# Patient Record
Sex: MALE | Race: WHITE | NOT HISPANIC OR LATINO | Employment: OTHER | URBAN - METROPOLITAN AREA
[De-identification: names, ages, dates, MRNs, and addresses within clinical notes are randomized per-mention and may not be internally consistent; named-entity substitution may affect disease eponyms.]

---

## 2017-05-18 ENCOUNTER — OFFICE VISIT - RIVER FALLS (OUTPATIENT)
Dept: FAMILY MEDICINE | Facility: CLINIC | Age: 72
End: 2017-05-18

## 2017-05-18 ASSESSMENT — MIFFLIN-ST. JEOR: SCORE: 1602.62

## 2017-06-02 ENCOUNTER — TRANSFERRED RECORDS (OUTPATIENT)
Dept: HEALTH INFORMATION MANAGEMENT | Facility: CLINIC | Age: 72
End: 2017-06-02

## 2017-06-29 ENCOUNTER — AMBULATORY - RIVER FALLS (OUTPATIENT)
Dept: FAMILY MEDICINE | Facility: CLINIC | Age: 72
End: 2017-06-29

## 2017-06-30 LAB
CHOLEST SERPL-MCNC: 210 MG/DL (ref 125–200)
CHOLEST/HDLC SERPL: 3.3 {RATIO}
CREAT SERPL-MCNC: 1.15 MG/DL (ref 0.7–1.18)
GLUCOSE BLD-MCNC: 87 MG/DL (ref 65–99)
HDLC SERPL-MCNC: 63 MG/DL
LDLC SERPL CALC-MCNC: 130 MG/DL
NONHDLC SERPL-MCNC: 147 MG/DL
PSA SERPL-MCNC: 4.3 NG/ML
TRIGL SERPL-MCNC: 83 MG/DL

## 2017-08-03 ENCOUNTER — TRANSFERRED RECORDS (OUTPATIENT)
Dept: HEALTH INFORMATION MANAGEMENT | Facility: CLINIC | Age: 72
End: 2017-08-03

## 2017-08-07 ENCOUNTER — TRANSFERRED RECORDS (OUTPATIENT)
Dept: HEALTH INFORMATION MANAGEMENT | Facility: CLINIC | Age: 72
End: 2017-08-07

## 2017-08-17 ENCOUNTER — PRE VISIT (OUTPATIENT)
Dept: CARDIOLOGY | Facility: CLINIC | Age: 72
End: 2017-08-17

## 2017-08-30 ENCOUNTER — OFFICE VISIT (OUTPATIENT)
Dept: CARDIOLOGY | Facility: CLINIC | Age: 72
End: 2017-08-30
Payer: MEDICARE

## 2017-08-30 VITALS
DIASTOLIC BLOOD PRESSURE: 64 MMHG | HEIGHT: 69 IN | HEART RATE: 48 BPM | SYSTOLIC BLOOD PRESSURE: 136 MMHG | BODY MASS INDEX: 28.29 KG/M2 | WEIGHT: 191 LBS

## 2017-08-30 DIAGNOSIS — I48.0 PAROXYSMAL ATRIAL FIBRILLATION (H): Primary | ICD-10-CM

## 2017-08-30 PROCEDURE — 99204 OFFICE O/P NEW MOD 45 MIN: CPT | Mod: 25 | Performed by: INTERNAL MEDICINE

## 2017-08-30 PROCEDURE — 93000 ELECTROCARDIOGRAM COMPLETE: CPT | Performed by: INTERNAL MEDICINE

## 2017-08-30 RX ORDER — AMLODIPINE BESYLATE 5 MG/1
5 TABLET ORAL DAILY
COMMUNITY
End: 2018-09-25

## 2017-08-30 RX ORDER — FLECAINIDE ACETATE 100 MG/1
100 TABLET ORAL 2 TIMES DAILY
COMMUNITY
End: 2021-04-12

## 2017-08-30 RX ORDER — LEVOTHYROXINE SODIUM 100 UG/1
100 TABLET ORAL DAILY
COMMUNITY
End: 2021-04-26

## 2017-08-30 RX ORDER — AMOXICILLIN 500 MG/1
500 CAPSULE ORAL
COMMUNITY
End: 2021-08-02

## 2017-08-30 RX ORDER — ATENOLOL 25 MG/1
12.5 TABLET ORAL DAILY
COMMUNITY
End: 2017-10-16

## 2017-08-30 NOTE — LETTER
2017             Pj Gordon MD   [facility]    [address] [place] [address]      RE: Zachariah Ram   MRN: [id number]   : 1945      Dear Dr. Gordon:       Thank you for allowing me to participate in the care of this very delightful patient.  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  Since then, he has been having occasional episodes of atrial fibrillation, for which he had been taking flecainide on a p.r.n. basis, but eventually he was taking it on a schedule several years ago.  He was taking 100 mg of flecainide twice a day along with atenolol at 12.5 mg a day.  The patient requested transfer of care from the [facility] to our clinic because of convenience.  He does spend 5 months here in [place] and the rest of the time in [place].      Approximately a month ago at the recommendation of a retired cardiologist, who happened to be his hiking george, he changed his atenolol to a calcium channel blocker.  At the same time, his flecainide was reduced from 100-50 twice a day as well.  Eventually, he developed atrial tachycardia with 2:1 AV conduction.  He was given Eliquis and proceeded with a RUBÉN-guided cardioversion 3 days later.  The RUBÉN report demonstrated a small residual communication between the left atrium and left atrial appendage.  He was recommended to be on Eliquis indefinitely given his CHADS-VASc score of 2.      Mr. Ram is otherwise doing quite well.  He hikes frequently, up to 4 hours each time.  He denies orthopnea, PND, chest pain or chest discomfort.  He is known to have a normal LV systolic function and no mitral regurgitation since the mitral valve repair.  His baseline heart rate is usually in the 40s-50s, and max is about 105 when he is very  active.      I congratulated the patient on his mostly asymptomatic status.  I will look at the actual DVD of his RUBÉN to quantify the amount of residual leak between the left atrium and left atrial appendage.  If we cannot determine that, we can perform a heart CT.  If there is no significant communication, one could argue that the risk of bleeding with continuation of anticoagulation may be higher than the benefit.  In the meantime, I would like the patient to continue with the flecainide and the current dose of atenolol.  The patient does have a history of premature contractions as well, and the flecainide seems to help him with it.  I would like to see him back in a year's time, sooner should the circumstances dictate.      Sincerely,      Juan Toure MD

## 2017-08-30 NOTE — PROGRESS NOTES
2017             Pj Gordon MD   [facility]    [address] [place] [address]      RE: Zachariah Ram   MRN: [id number]   : 1945      Dear Dr. Gordon:       Thank you for allowing me to participate in the care of this very delightful patient.  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  Since then, he has been having occasional episodes of atrial fibrillation, for which he had been taking flecainide on a p.r.n. basis, but eventually he was taking it on a schedule several years ago.  He was taking 100 mg of flecainide twice a day along with atenolol at 12.5 mg a day.  The patient requested transfer of care from the [facility] to our clinic because of convenience.  He does spend 5 months here in [place] and the rest of the time in [place].      Approximately a month ago at the recommendation of a retired cardiologist, who happened to be his hiking george, he changed his atenolol to a calcium channel blocker.  At the same time, his flecainide was reduced from 100-50 twice a day as well.  Eventually, he developed atrial tachycardia with 2:1 AV conduction.  He was given Eliquis and proceeded with a RUBÉN-guided cardioversion 3 days later.  The RUBÉN report demonstrated a small residual communication between the left atrium and left atrial appendage.  He was recommended to be on Eliquis indefinitely given his CHADS-VASc score of 2.      Mr. Ram is otherwise doing quite well.  He hikes frequently, up to 4 hours each time.  He denies orthopnea, PND, chest pain or chest discomfort.  He is known to have a normal LV systolic function and no mitral regurgitation since the mitral valve repair.  His baseline heart rate is usually in the 40s-50s, and max is about 105 when he is very  active.      I congratulated the patient on his mostly asymptomatic status.  I will look at the actual DVD of his RUBÉN to quantify the amount of residual leak between the left atrium and left atrial appendage.  If we cannot determine that, we can perform a heart CT.  If there is no significant communication, one could argue that the risk of bleeding with continuation of anticoagulation may be higher than the benefit.  In the meantime, I would like the patient to continue with the flecainide and the current dose of atenolol.  The patient does have a history of premature contractions as well, and the flecainide seems to help him with it.  I would like to see him back in a year's time, sooner should the circumstances dictate.      Sincerely,      MD KIRBY Campbell MD             D: 2017 08:51   T: 2017 12:07   MT: claudia      Name:     LA NENA GIBSON   MRN:      [id number]        Account:      [id number]   :      1945           Service Date: 2017      Document: [id number]

## 2017-08-30 NOTE — PROGRESS NOTES
HPI and Plan:   See dictation  [id number]  Orders Placed This Encounter   Procedures     EKG 12-lead complete w/read - Clinics (performed today)       Orders Placed This Encounter   Medications     apixaban ANTICOAGULANT (ELIQUIS) 5 MG tablet     flecainide (TAMBOCOR) 100 MG tablet     Sig: Take 100 mg by mouth     amLODIPine (NORVASC) 5 MG tablet     Sig: Take 5 mg by mouth daily     atenolol (TENORMIN) 12.5 mg TABS half-tab     Sig: Take 12.5 mg by mouth daily     levothyroxine (LEVOXYL) 100 MCG tablet     Sig: Take 100 mcg by mouth daily     amoxicillin (AMOXIL) 500 MG capsule     Sig: Take 500 mg by mouth Prior to dentist 2000mg       There are no discontinued medications.      Encounter Diagnosis   Name Primary?     Paroxysmal atrial fibrillation (H) Yes       CURRENT MEDICATIONS:  Current Outpatient Prescriptions   Medication Sig Dispense Refill     apixaban ANTICOAGULANT (ELIQUIS) 5 MG tablet        flecainide (TAMBOCOR) 100 MG tablet Take 100 mg by mouth       amLODIPine (NORVASC) 5 MG tablet Take 5 mg by mouth daily       atenolol (TENORMIN) 12.5 mg TABS half-tab Take 12.5 mg by mouth daily       levothyroxine (LEVOXYL) 100 MCG tablet Take 100 mcg by mouth daily       amoxicillin (AMOXIL) 500 MG capsule Take 500 mg by mouth Prior to dentist 2000mg         ALLERGIES   Not on File    PAST MEDICAL HISTORY:  Past Medical History:   Diagnosis Date     Atrial flutter (H)      GERD (gastroesophageal reflux disease)      Hyperlipidemia      Hypertension      Obstructive sleep apnea      Paroxysmal atrial fibrillation (H)      Smoking history     quit 1973       PAST SURGICAL HISTORY:  Past Surgical History:   Procedure Laterality Date     CARDIOVERSION  08/07/2017     Left atrial appendage ligation  1996     REPAIR VALVE MITRAL  1996       FAMILY HISTORY:  Family History   Problem Relation Age of Onset     Coronary Artery Disease Early Onset Father        SOCIAL HISTORY:  Social History     Social History     Marital  "status:      Spouse name: N/A     Number of children: N/A     Years of education: N/A     Social History Main Topics     Smoking status: Never Smoker     Smokeless tobacco: Never Used     Alcohol use None     Drug use: None     Sexual activity: Not Asked     Other Topics Concern     None     Social History Narrative       Review of Systems:  Skin:  Negative       Eyes:  Positive for glasses    ENT:  Negative      Respiratory:  Negative       Cardiovascular:  Negative      Gastroenterology: Negative      Genitourinary:  Negative      Musculoskeletal:  Negative      Neurologic:  Negative      Psychiatric:  Negative      Heme/Lymph/Imm:  Negative      Endocrine:  Positive for thyroid disorder      Physical Exam:  Vitals: /64  Pulse (!) 48  Ht 1.753 m (5' 9\")  Wt 86.6 kg (191 lb)  BMI 28.21 kg/m2    Constitutional:  cooperative, alert and oriented, well developed, well nourished, in no acute distress        Skin:  warm and dry to the touch, no apparent skin lesions or masses noted        Head:  normocephalic, no masses or lesions        Eyes:  pupils equal and round, conjunctivae and lids unremarkable, sclera white, no xanthalasma, EOMS intact, no nystagmus        ENT:  no pallor or cyanosis, dentition good        Neck:  carotid pulses are full and equal bilaterally, JVP normal, no carotid bruit, no thyromegaly        Chest:  normal breath sounds, clear to auscultation, normal A-P diameter, normal symmetry, normal respiratory excursion, no use of accessory muscles          Cardiac: regular rhythm, normal S1/S2, no S3 or S4, apical impulse not displaced, no murmurs, gallops or rubs                  Abdomen:  abdomen soft, non-tender, BS normoactive, no mass, no HSM, no bruits        Vascular: pulses full and equal, no bruits auscultated                                        Extremities and Back:  no deformities, clubbing, cyanosis, erythema observed              Neurological:  affect appropriate, " oriented to time, person and place              CC  No referring provider defined for this encounter.

## 2017-08-30 NOTE — MR AVS SNAPSHOT
"              After Visit Summary   2017    Zachariah Ram    MRN: [id number]           Patient Information     Date Of Birth          1945        Visit Information        Provider Department      2017 8:15 AM Juan Toure MD [facility] [place]        Today's Diagnoses     Paroxysmal atrial fibrillation (H)    -  1       Follow-ups after your visit        Who to contact     If you have questions or need follow up information about today's clinic visit or your schedule please contact [facility] directly at [phone].  Normal or non-critical lab and imaging results will be communicated to you by [employer], letter or phone within 4 business days after the clinic has received the results. If you do not hear from us within 7 days, please contact the clinic through [employer] or phone. If you have a critical or abnormal lab result, we will notify you by phone as soon as possible.  Submit refill requests through [employer] or call your pharmacy and they will forward the refill request to us. Please allow 3 business days for your refill to be completed.          Additional Information About Your Visit        MyChart Information     [employer] lets you send messages to your doctor, view your test results, renew your prescriptions, schedule appointments and more. To sign up, go to [email or web address] . Click on \"Log in\" on the left side of the screen, which will take you to the Welcome page. Then click on \"Sign up Now\" on the right side of the page.     You will be asked to enter the access code listed below, as well as some personal information. Please follow the directions to create your username and password.     Your access code is: [email or web address]  Expires: 2017  8:44 AM     Your access code will  in 90 days. If you need help or a new code, please call your [place] clinic or [phone].        Care EveryWhere ID     " "This is your Care EveryWhere ID. This could be used by other organizations to access your [facility] medical records  [id number]        Your Vitals Were     Pulse Height BMI (Body Mass Index)             48 1.753 m (5' 9\") 28.21 kg/m2          Blood Pressure from Last 3 Encounters:   08/30/17 136/64    Weight from Last 3 Encounters:   08/30/17 86.6 kg (191 lb)              We Performed the Following     EKG 12-lead complete w/read - Clinics (performed today)        Primary Care Provider Office Phone # Fax #    Pj Gordon [phone]       [facility] [address] [facility] [address]        Equal Access to Services     CARLEE LIND : Hadii jeanette arorao Soyrn, waaxda luqadaha, qaybta kaalmada adeegyada, fredy oseguera . So [facility] [phone].    ATENCIÓN: Si habla español, tiene a arellano disposición servicios gratuitos de asistencia lingüística. [facility] [phone].    We comply with applicable federal civil rights laws and [place] laws. We do not discriminate on the basis of race, color, national origin, age, disability sex, sexual orientation or gender identity.            Thank you!     Thank you for choosing [facility] [place]  for your care. Our goal is always to provide you with excellent care. Hearing back from our patients is one way we can continue to improve our services. Please take a few minutes to complete the written survey that you may receive in the mail after your visit with us. Thank you!             Your Updated Medication List - Protect others around you: Learn how to safely use, store and throw away your medicines at www.disposemymeds.org.          This list is accurate as of: 8/30/17  8:44 AM.  Always use your most recent med list.                   Brand Name Dispense Instructions for use Diagnosis    amLODIPine 5 MG tablet    NORVASC     Take 5 mg by mouth daily        amoxicillin 500 " MG capsule    AMOXIL     Take 500 mg by mouth Prior to dentist 2000mg        atenolol 12.5 mg Tabs half-tab    TENORMIN     Take 12.5 mg by mouth daily        ELIQUIS 5 MG tablet   Generic drug:  apixaban ANTICOAGULANT           flecainide 100 MG tablet    TAMBOCOR     Take 100 mg by mouth        LEVOXYL 100 MCG tablet   Generic drug:  levothyroxine      Take 100 mcg by mouth daily

## 2017-09-01 ENCOUNTER — TELEPHONE (OUTPATIENT)
Dept: CARDIOLOGY | Facility: CLINIC | Age: 72
End: 2017-09-01

## 2017-10-16 ENCOUNTER — TELEPHONE (OUTPATIENT)
Dept: CARDIOLOGY | Facility: CLINIC | Age: 72
End: 2017-10-16

## 2017-10-16 DIAGNOSIS — I48.0 PAROXYSMAL ATRIAL FIBRILLATION (H): Primary | ICD-10-CM

## 2017-10-16 RX ORDER — METOPROLOL SUCCINATE 25 MG/1
TABLET, EXTENDED RELEASE ORAL
Qty: 45 TABLET | Refills: 2 | Status: SHIPPED | OUTPATIENT
Start: 2017-10-16 | End: 2018-05-11

## 2017-10-16 NOTE — TELEPHONE ENCOUNTER
Patient called and atenolol not available at the pharmacy currently on 12.5mg po qd.  Spoke with Dr Toure patient to switch over to metoporlol XL 12.5mg po qd.  Patient requesting to have sent to [facility].  Medication list updated in epic and medication sent to requested pharmacy.  Message left for patient regarding above.  KAIN Torres

## 2018-05-11 DIAGNOSIS — I48.0 PAROXYSMAL ATRIAL FIBRILLATION (H): ICD-10-CM

## 2018-05-11 RX ORDER — METOPROLOL SUCCINATE 25 MG/1
TABLET, EXTENDED RELEASE ORAL
Qty: 90 TABLET | Refills: 1 | Status: SHIPPED | OUTPATIENT
Start: 2018-05-11 | End: 2018-08-22

## 2018-07-19 ENCOUNTER — TELEPHONE (OUTPATIENT)
Dept: CARDIOLOGY | Facility: CLINIC | Age: 73
End: 2018-07-19

## 2018-07-19 NOTE — TELEPHONE ENCOUNTER
Patient called in to inform us that he is undergoing a biopsy of his prostate on 8/9 at [facility] and wants to know how many days he needs to hold the Eliquis and when to resume it. I reviewed his chart. His CHADS2-VASc is 2 ( age, HTN) and denies and history for CVA/TIA. He did undergo a Alayna mitral valve annuloplasty in 1996. I checked with the TAVR RN's to see if bridging is indicateed and they said it was NOT.Therefore, he was instructed to hold Eliquis 3 days and to resume upon the discretion of the urologist. He asked for a Mychart code and I gave him the number to call to obtain this. He returns next month to see Dr. Raleigh Wan

## 2018-08-22 ENCOUNTER — MYC MEDICAL ADVICE (OUTPATIENT)
Dept: CARDIOLOGY | Facility: CLINIC | Age: 73
End: 2018-08-22

## 2018-08-22 ENCOUNTER — OFFICE VISIT (OUTPATIENT)
Dept: CARDIOLOGY | Facility: CLINIC | Age: 73
End: 2018-08-22
Attending: INTERNAL MEDICINE
Payer: MEDICARE

## 2018-08-22 VITALS
SYSTOLIC BLOOD PRESSURE: 120 MMHG | DIASTOLIC BLOOD PRESSURE: 60 MMHG | BODY MASS INDEX: 28.73 KG/M2 | WEIGHT: 194 LBS | HEART RATE: 40 BPM | HEIGHT: 69 IN

## 2018-08-22 DIAGNOSIS — I48.0 PAROXYSMAL ATRIAL FIBRILLATION (H): ICD-10-CM

## 2018-08-22 DIAGNOSIS — N52.9 VASCULOGENIC ERECTILE DYSFUNCTION, UNSPECIFIED VASCULOGENIC ERECTILE DYSFUNCTION TYPE: Primary | ICD-10-CM

## 2018-08-22 PROCEDURE — 93000 ELECTROCARDIOGRAM COMPLETE: CPT | Performed by: INTERNAL MEDICINE

## 2018-08-22 PROCEDURE — 99214 OFFICE O/P EST MOD 30 MIN: CPT | Mod: 25 | Performed by: INTERNAL MEDICINE

## 2018-08-22 RX ORDER — METAPROTERENOL SULFATE 10 MG
TABLET ORAL DAILY
COMMUNITY
End: 2019-01-04

## 2018-08-22 RX ORDER — SILDENAFIL 50 MG/1
50 TABLET, FILM COATED ORAL DAILY PRN
Qty: 30 TABLET | Refills: 3 | Status: SHIPPED | OUTPATIENT
Start: 2018-08-22 | End: 2018-08-22

## 2018-08-22 RX ORDER — SPIRONOLACT/HYDROCHLOROTHIAZID 25 MG-25MG
TABLET ORAL DAILY
COMMUNITY

## 2018-08-22 RX ORDER — SILDENAFIL CITRATE 20 MG/1
TABLET ORAL
Qty: 90 TABLET | Refills: 3 | Status: SHIPPED | OUTPATIENT
Start: 2018-08-22 | End: 2021-10-20

## 2018-08-22 RX ORDER — MULTIVIT-MIN/IRON/FOLIC ACID/K 18-600-40
500 CAPSULE ORAL 2 TIMES DAILY
COMMUNITY

## 2018-08-22 RX ORDER — KETOCONAZOLE 20 MG/G
CREAM TOPICAL DAILY PRN
COMMUNITY
End: 2023-05-18

## 2018-08-22 RX ORDER — UBIDECARENONE 30 MG
550 CAPSULE ORAL DAILY
COMMUNITY

## 2018-08-22 RX ORDER — LANOLIN ALCOHOL/MO/W.PET/CERES
5 CREAM (GRAM) TOPICAL
COMMUNITY
End: 2020-10-09

## 2018-08-22 NOTE — LETTER
8/22/2018    Stacy San MD, MD  [employer] [address]  [facility] [address]    RE: Zachariah Ram       Dear Colleague,    I had the pleasure of seeing Zachariah Ram in the [facility].    HPI and Plan:   See dictation  [id number]  Orders Placed This Encounter   Procedures     Follow-Up with Electrophysiologist     EKG 12-lead complete w/read - Clinics (performed today)       Orders Placed This Encounter   Medications     Potassium Gluconate 595 MG CAPS     Sig: Take 595 mg by mouth daily     RaNITidine HCl (RANITIDINE 150 MAX STRENGTH PO)     Sig: Take 150 mg by mouth daily     magnesium 200 MG TABS     Sig: Take 250 mg by mouth daily     Flaxseed, Linseed, (FLAXSEED OIL) 1200 MG CAPS     Sig: Take 2,400 mg by mouth daily     Misc Natural Products (GLUCOSAMINE CHOND MSM FORMULA PO)     Sig: Take by mouth daily     Multiple Vitamins-Minerals (VITRUM 50+ SENIOR MULTI) TABS     Sig: Take by mouth daily     Ascorbic Acid (VITAMIN C) 500 MG CAPS     Sig: Take 500 mg by mouth daily 500 mg in Am and 1,000 mg in PM     Cholecalciferol (VITAMIN D3) 1000 units CAPS     Sig: Take 1,000 mg by mouth daily     melatonin 3 MG tablet     Sig: Take 1 mg by mouth nightly as needed for sleep     CRANBERRY CONCENTRATE PO     Sig: Take 4,200 mg by mouth daily     Misc Natural Products (PROSTATE THERAPY COMPLEX) CAPS     Sig: Take by mouth daily     ketoconazole (NIZORAL) 2 % cream     Sig: Apply topically daily as needed for itching     sildenafil (VIAGRA) 50 MG tablet     Sig: Take 1 tablet (50 mg) by mouth daily as needed     Dispense:  30 tablet     Refill:  3       Medications Discontinued During This Encounter   Medication Reason     metoprolol succinate (TOPROL-XL) 25 MG 24 hr tablet          Encounter Diagnoses   Name Primary?     Paroxysmal atrial fibrillation (H)      Vasculogenic erectile dysfunction, unspecified vasculogenic erectile dysfunction type Yes       CURRENT  MEDICATIONS:  Current Outpatient Prescriptions   Medication Sig Dispense Refill     amLODIPine (NORVASC) 5 MG tablet Take 5 mg by mouth daily       amoxicillin (AMOXIL) 500 MG capsule Take 500 mg by mouth Prior to dentist 2000mg       apixaban ANTICOAGULANT (ELIQUIS) 5 MG tablet        Ascorbic Acid (VITAMIN C) 500 MG CAPS Take 500 mg by mouth daily 500 mg in Am and 1,000 mg in PM       Cholecalciferol (VITAMIN D3) 1000 units CAPS Take 1,000 mg by mouth daily       CRANBERRY CONCENTRATE PO Take 4,200 mg by mouth daily       Flaxseed, Linseed, (FLAXSEED OIL) 1200 MG CAPS Take 2,400 mg by mouth daily       flecainide (TAMBOCOR) 100 MG tablet Take 100 mg by mouth       ketoconazole (NIZORAL) 2 % cream Apply topically daily as needed for itching       levothyroxine (LEVOXYL) 100 MCG tablet Take 100 mcg by mouth daily       magnesium 200 MG TABS Take 250 mg by mouth daily       melatonin 3 MG tablet Take 1 mg by mouth nightly as needed for sleep       Misc Natural Products (GLUCOSAMINE CHOND MSM FORMULA PO) Take by mouth daily       Misc Natural Products (PROSTATE THERAPY COMPLEX) CAPS Take by mouth daily       Multiple Vitamins-Minerals (VITRUM 50+ SENIOR MULTI) TABS Take by mouth daily       Potassium Gluconate 595 MG CAPS Take 595 mg by mouth daily       RaNITidine HCl (RANITIDINE 150 MAX STRENGTH PO) Take 150 mg by mouth daily       sildenafil (VIAGRA) 50 MG tablet Take 1 tablet (50 mg) by mouth daily as needed 30 tablet 3       ALLERGIES   No Known Allergies    PAST MEDICAL HISTORY:  Past Medical History:   Diagnosis Date     Atrial flutter (H)      GERD (gastroesophageal reflux disease)      Hyperlipidemia      Hypertension      Obstructive sleep apnea      Paroxysmal atrial fibrillation (H)      Smoking history     quit 1973       PAST SURGICAL HISTORY:  Past Surgical History:   Procedure Laterality Date     CARDIOVERSION  08/07/2017     COLONOSCOPY  2015     at [facility] ([facility],  [place])     HC KNEE SCOPE,MED/LAT MENISCUS REPAIR Left 2016     HERNIA REPAIR  1994     Left atrial appendage ligation  1996     MR PROSTATE  WO & W CONTRAST  2016, 2018     PAROTIDECTOMY  2004    partial parotidectomy     REPAIR VALVE MITRAL  1996     SKIN CANCER SCREENING      Skin cancer surgeries-- basal on ear 2008, melanoma on left sideburn 2011, carcinoma right arm 2013     THYROIDECTOMY  2008    partial thyroidectomy       FAMILY HISTORY:  Family History   Problem Relation Age of Onset     Coronary Artery Disease Early Onset Father        SOCIAL HISTORY:  Social History     Social History     Marital status:      Spouse name: N/A     Number of children: N/A     Years of education: N/A     Social History Main Topics     Smoking status: Never Smoker     Smokeless tobacco: Never Used     Alcohol use Yes      Comment: occassional     Drug use: Not on file     Sexual activity: Not on file     Other Topics Concern     Not on file     Social History Narrative       Review of Systems:  Skin:  Negative       Eyes:  Positive for glasses    ENT:  Negative      Respiratory:  Negative       Cardiovascular:  Negative      Gastroenterology: Negative      Genitourinary:  Negative      Musculoskeletal:  Negative      Neurologic:  Negative      Psychiatric:  Negative      Heme/Lymph/Imm:  Negative      Endocrine:  Positive for thyroid disorder      Physical Exam:  Vitals: /60  Pulse (!) 40  Ht 1.753 m (5' 9.02\")  Wt 88 kg (194 lb)  BMI 28.64 kg/m2    Constitutional:  cooperative, alert and oriented, well developed, well nourished, in no acute distress appears younger than stated age      Skin:  warm and dry to the touch, no apparent skin lesions or masses noted          Head:  normocephalic, no masses or lesions        Eyes:  pupils equal and round, conjunctivae and lids unremarkable, sclera white, no xanthalasma, EOMS intact, no nystagmus        Lymph:No Cervical lymphadenopathy present     ENT:  no pallor or " cyanosis, dentition good        Neck:  carotid pulses are full and equal bilaterally, JVP normal, no carotid bruit        Respiratory:  normal breath sounds, clear to auscultation, normal A-P diameter, normal symmetry, normal respiratory excursion, no use of accessory muscles         Cardiac: regular rhythm;normal S1 and S2 bradycardic              pulses full and equal, no bruits auscultated                                        GI:  abdomen soft, non-tender, BS normoactive, no mass, no HSM, no bruits        Extremities and Muscular Skeletal:  no deformities, clubbing, cyanosis, erythema observed              Neurological:  no gross motor deficits        Psych:  Alert and Oriented x 3        CC  Juan Isaac Toure MD  [address]  [place] [address]                Service Date: 08/22/2018      HISTORY OF PRESENT ILLNESS:  Thank you for allowing me to participate in the care of this very delightful patient.  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  I had the pleasure of meeting the patient for first time a year ago.  He has been followed at the [facility] in [place] where he spent half a year there.      Prior to last year's visit, the patient was hospitalized for atrial tachycardia with 2:1 AV conduction.  The patient underwent a RUBÉN-guided cardioversion about 3 days later.  The RUBÉN did demonstrate a small residual communication between the left atrium and left atrial appendage and given CHADS-VASc score of 2, I agreed with the recommendation of continuing with his Eliquis.      The patient has been on flecainide 100 mg twice a day along with Toprol 12.5 mg in light of his known sinus bradycardia  Over this past year, doing quite well.  He remains very active, hiking to 2-3 times a week consisting of 2-3 hours each time.  He does admit to  feeling short of breath when doing climbing but otherwise denies any lightheadedness or dizziness.  Today's blood pressure was elevated when he walked in at 156/80 but later on, it went down to 120/60.  EKG demonstrates sinus bradycardia, rate about 45 beats per minute.  The QRS width remained the same and within normal range.      Zachariah was noted to have a high PSA level now but the biopsy was negative for cancer.  However, he stated that the MRI was done and was informed that he had a 90% probability of developing prostate cancer later on.  Additionally, he does note that his libido has come down and requests a trial of Viagra if possible.  Certainly, given his normal LV function and no evidence of any active coronary disease, I think it is reasonable for him to be on Viagra.  The patient also wanted to know whether Flomax can be helpful for his known benign prostatic hypertrophy for which he has some symptoms.  I asked him to talk to you regarding these issues.  I did go over side effects of Viagra including hypotension as well.      Lastly, I did encourage the patient to consider stopping metoprolol, given he does have symptoms of sinus bradycardia and moreover, he is on a very low dose of it.  He can take it on a p.r.n. basis, should he go into atrial tachyarrhythmia with rapid ventricular response.  For now, things are going quite well for him with the current dose of flecainide.  I would like to see Zachariah back in a year.      cc:   Stacy San MD    [facility]   [address]   [place] [address]         KIRBY GANNON MD             D: 2018   T: 2018   MT: DEREK      Name:     ZACHARIAH GIBSON   MRN:      [id number]        Account:      [id number]   :      1945           Service Date: 2018      Document: [id number]        Thank you for allowing me to participate in the care of your patient.      Sincerely,     Kirby Cope MD     [facility]    cc:   Juan Cope MD  [address]  [place] [address]

## 2018-08-22 NOTE — MR AVS SNAPSHOT
After Visit Summary   8/22/2018    Zachariah Ram    MRN: [id number]           Patient Information     Date Of Birth          1945        Visit Information        Provider Department      8/22/2018 7:45 AM Juan Toure MD [facility]        Today's Diagnoses     Vasculogenic erectile dysfunction, unspecified vasculogenic erectile dysfunction type    -  1    Paroxysmal atrial fibrillation (H)           Follow-ups after your visit        Additional Services     Follow-Up with Electrophysiologist                 Future tests that were ordered for you today     Open Future Orders        Priority Expected Expires Ordered    Follow-Up with Electrophysiologist Routine 8/22/2019 8/23/2019 8/22/2018            Who to contact     If you have questions or need follow up information about today's clinic visit or your schedule please contact [facility] directly at [phone].  Normal or non-critical lab and imaging results will be communicated to you by [employer], letter or phone within 4 business days after the clinic has received the results. If you do not hear from us within 7 days, please contact the clinic through [employer] or phone. If you have a critical or abnormal lab result, we will notify you by phone as soon as possible.  Submit refill requests through [facility] or call your pharmacy and they will forward the refill request to us. Please allow 3 business days for your refill to be completed.          Additional Information About Your Visit        MyChart Information     [facility] gives you secure access to your electronic health record. If you see a primary care provider, you can also send messages to your care team and make appointments. If you have questions, please call your primary care clinic.  If you do not have a primary care provider, please call [phone] and they will assist you.        Care EveryWhere ID      "This is your Care EveryWhere ID. This could be used by other organizations to access your [facility] medical records  [id number]        Your Vitals Were     Pulse Height BMI (Body Mass Index)             40 1.753 m (5' 9.02\") 28.64 kg/m2          Blood Pressure from Last 3 Encounters:   08/22/18 120/60   08/30/17 136/64    Weight from Last 3 Encounters:   08/22/18 88 kg (194 lb)   08/30/17 86.6 kg (191 lb)              We Performed the Following     EKG 12-lead complete w/read - Clinics (performed today)     Follow-Up with Electrophysiologist          Today's Medication Changes          These changes are accurate as of 8/22/18  8:33 AM.  If you have any questions, ask your nurse or doctor.               Start taking these medicines.        Dose/Directions    sildenafil 50 MG tablet   Commonly known as:  VIAGRA   Used for:  Vasculogenic erectile dysfunction, unspecified vasculogenic erectile dysfunction type   Started by:  Juan Toure MD        Dose:  50 mg   Take 1 tablet (50 mg) by mouth daily as needed   Quantity:  30 tablet   Refills:  3         Stop taking these medicines if you haven't already. Please contact your care team if you have questions.     metoprolol succinate 25 MG 24 hr tablet   Commonly known as:  TOPROL-XL   Stopped by:  Juan Toure MD                Where to get your medicines      These medications were sent to [facility] [address] - [place], [place] - [address]  [address], [facility] [address]     Phone:  [phone]     sildenafil 50 MG tablet                Primary Care Provider Office Phone # Fax #    Stacy San [phone]       [facility] [address]  [facility] [address]        Equal Access to Services     [facility]: Hadrenata Valencia, rigoberto diehl, fredy granger. So [facility] [phone].    ATENCIÓN: Si habla español, tiene a arellano disposición servicios gratuitos de asistencia " lingüística. Jose al [phone].    We comply with applicable federal civil rights laws and [place] laws. We do not discriminate on the basis of race, color, national origin, age, disability, sex, sexual orientation, or gender identity.            Thank you!     Thank you for choosing [facility]  for your care. Our goal is always to provide you with excellent care. Hearing back from our patients is one way we can continue to improve our services. Please take a few minutes to complete the written survey that you may receive in the mail after your visit with us. Thank you!             Your Updated Medication List - Protect others around you: Learn how to safely use, store and throw away your medicines at www.disposemymeds.org.          This list is accurate as of 8/22/18  8:33 AM.  Always use your most recent med list.                   Brand Name Dispense Instructions for use Diagnosis    amLODIPine 5 MG tablet    NORVASC     Take 5 mg by mouth daily        amoxicillin 500 MG capsule    AMOXIL     Take 500 mg by mouth Prior to dentist 2000mg        CRANBERRY CONCENTRATE PO      Take 4,200 mg by mouth daily        ELIQUIS 5 MG tablet   Generic drug:  apixaban ANTICOAGULANT           Flaxseed Oil 1200 MG Caps      Take 2,400 mg by mouth daily        flecainide 100 MG tablet    TAMBOCOR     Take 100 mg by mouth        * GLUCOSAMINE CHOND MSM FORMULA PO      Take by mouth daily        * PROSTATE THERAPY COMPLEX Caps      Take by mouth daily        ketoconazole 2 % cream    NIZORAL     Apply topically daily as needed for itching        LEVOXYL 100 MCG tablet   Generic drug:  levothyroxine      Take 100 mcg by mouth daily        magnesium 200 MG Tabs      Take 250 mg by mouth daily        melatonin 3 MG tablet      Take 1 mg by mouth nightly as needed for sleep        Potassium Gluconate 595 MG Caps      Take 595 mg by mouth daily        RANITIDINE 150 MAX STRENGTH PO      Take  150 mg by mouth daily        sildenafil 50 MG tablet    VIAGRA    30 tablet    Take 1 tablet (50 mg) by mouth daily as needed    Vasculogenic erectile dysfunction, unspecified vasculogenic erectile dysfunction type       Vitamin C 500 MG Caps      Take 500 mg by mouth daily 500 mg in Am and 1,000 mg in PM        vitamin D3 1000 units Caps      Take 1,000 mg by mouth daily        VITRUM 50+ SENIOR MULTI Tabs      Take by mouth daily        * Notice:  This list has 2 medication(s) that are the same as other medications prescribed for you. Read the directions carefully, and ask your doctor or other care provider to review them with you.

## 2018-08-22 NOTE — LETTER
8/22/2018      Stacy San MD, MD  [place] [address]  [facility] [address]      RE: Zachariah Ram       Dear Colleague,    I had the pleasure of seeing Zachariah Ram in the [facility].    Service Date: 08/22/2018      HISTORY OF PRESENT ILLNESS:  Thank you for allowing me to participate in the care of this very delightful patient.  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  I had the pleasure of meeting the patient for first time a year ago.  He has been followed at the [facility] in [place] where he spent half a year there.      Prior to last year's visit, the patient was hospitalized for atrial tachycardia with 2:1 AV conduction.  The patient underwent a RUBÉN-guided cardioversion about 3 days later.  The RUBÉN did demonstrate a small residual communication between the left atrium and left atrial appendage and given CHADS-VASc score of 2, I agreed with the recommendation of continuing with his Eliquis.      The patient has been on flecainide 100 mg twice a day along with Toprol 12.5 mg in light of his known sinus bradycardia  Over this past year, doing quite well.  He remains very active, hiking to 2-3 times a week consisting of 2-3 hours each time.  He does admit to feeling short of breath when doing climbing but otherwise denies any lightheadedness or dizziness.  Today's blood pressure was elevated when he walked in at 156/80 but later on, it went down to 120/60.  EKG demonstrates sinus bradycardia, rate about 45 beats per minute.  The QRS width remained the same and within normal range.      Zachariah was noted to have a high PSA level now but the biopsy was negative for cancer.  However, he stated that the MRI was done and was informed that he had a 90% probability of developing prostate cancer later  on.  Additionally, he does note that his libido has come down and requests a trial of Viagra if possible.  Certainly, given his normal LV function and no evidence of any active coronary disease, I think it is reasonable for him to be on Viagra.  The patient also wanted to know whether Flomax can be helpful for his known benign prostatic hypertrophy for which he has some symptoms.  I asked him to talk to you regarding these issues.  I did go over side effects of Viagra including hypotension as well.      Lastly, I did encourage the patient to consider stopping metoprolol, given he does have symptoms of sinus bradycardia and moreover, he is on a very low dose of it.  He can take it on a p.r.n. basis, should he go into atrial tachyarrhythmia with rapid ventricular response.  For now, things are going quite well for him with the current dose of flecainide.  I would like to see Zachariah back in a year.      cc:   Stacy San MD    [facility]   [address]   [place] [address]         KIRBY GANNON MD             D: 2018   T: 2018   MT: DEREK      Name:     ZACHARIAH GIBSON   MRN:      [id number]        Account:      [id number]   :      1945           Service Date: 2018      Document: [id number]           Outpatient Encounter Prescriptions as of 2018   Medication Sig Dispense Refill     amLODIPine (NORVASC) 5 MG tablet Take 5 mg by mouth daily       amoxicillin (AMOXIL) 500 MG capsule Take 500 mg by mouth Prior to dentist 2000mg       apixaban ANTICOAGULANT (ELIQUIS) 5 MG tablet        Ascorbic Acid (VITAMIN C) 500 MG CAPS Take 500 mg by mouth daily 500 mg in Am and 1,000 mg in PM       Cholecalciferol (VITAMIN D3) 1000 units CAPS Take 1,000 mg by mouth daily       CRANBERRY CONCENTRATE PO Take 4,200 mg by mouth daily       Flaxseed, Linseed, (FLAXSEED OIL) 1200 MG CAPS Take 2,400 mg by mouth daily       flecainide (TAMBOCOR) 100 MG tablet Take 100 mg by  mouth       ketoconazole (NIZORAL) 2 % cream Apply topically daily as needed for itching       levothyroxine (LEVOXYL) 100 MCG tablet Take 100 mcg by mouth daily       magnesium 200 MG TABS Take 250 mg by mouth daily       melatonin 3 MG tablet Take 1 mg by mouth nightly as needed for sleep       Misc Natural Products (GLUCOSAMINE CHOND MSM FORMULA PO) Take by mouth daily       Misc Natural Products (PROSTATE THERAPY COMPLEX) CAPS Take by mouth daily       Multiple Vitamins-Minerals (VITRUM 50+ SENIOR MULTI) TABS Take by mouth daily       Potassium Gluconate 595 MG CAPS Take 595 mg by mouth daily       RaNITidine HCl (RANITIDINE 150 MAX STRENGTH PO) Take 150 mg by mouth daily       sildenafil (REVATIO) 20 MG tablet Take 3 tablets (20 mg) by mouth daily as needed for erectile dysfunction 90 tablet 3     [DISCONTINUED] metoprolol succinate (TOPROL-XL) 25 MG 24 hr tablet Take 12.5 mg (1/2 tablet) po daily 90 tablet 1     [DISCONTINUED] sildenafil (VIAGRA) 50 MG tablet Take 1 tablet (50 mg) by mouth daily as needed 30 tablet 3     No facility-administered encounter medications on file as of 8/22/2018.        Again, thank you for allowing me to participate in the care of your patient.      Sincerely,    Juan Cope MD     [facility]

## 2018-08-22 NOTE — PROGRESS NOTES
HPI and Plan:   See dictation  [id number]  Orders Placed This Encounter   Procedures     Follow-Up with Electrophysiologist     EKG 12-lead complete w/read - Clinics (performed today)       Orders Placed This Encounter   Medications     Potassium Gluconate 595 MG CAPS     Sig: Take 595 mg by mouth daily     RaNITidine HCl (RANITIDINE 150 MAX STRENGTH PO)     Sig: Take 150 mg by mouth daily     magnesium 200 MG TABS     Sig: Take 250 mg by mouth daily     Flaxseed, Linseed, (FLAXSEED OIL) 1200 MG CAPS     Sig: Take 2,400 mg by mouth daily     Misc Natural Products (GLUCOSAMINE CHOND MSM FORMULA PO)     Sig: Take by mouth daily     Multiple Vitamins-Minerals (VITRUM 50+ SENIOR MULTI) TABS     Sig: Take by mouth daily     Ascorbic Acid (VITAMIN C) 500 MG CAPS     Sig: Take 500 mg by mouth daily 500 mg in Am and 1,000 mg in PM     Cholecalciferol (VITAMIN D3) 1000 units CAPS     Sig: Take 1,000 mg by mouth daily     melatonin 3 MG tablet     Sig: Take 1 mg by mouth nightly as needed for sleep     CRANBERRY CONCENTRATE PO     Sig: Take 4,200 mg by mouth daily     Misc Natural Products (PROSTATE THERAPY COMPLEX) CAPS     Sig: Take by mouth daily     ketoconazole (NIZORAL) 2 % cream     Sig: Apply topically daily as needed for itching     sildenafil (VIAGRA) 50 MG tablet     Sig: Take 1 tablet (50 mg) by mouth daily as needed     Dispense:  30 tablet     Refill:  3       Medications Discontinued During This Encounter   Medication Reason     metoprolol succinate (TOPROL-XL) 25 MG 24 hr tablet          Encounter Diagnoses   Name Primary?     Paroxysmal atrial fibrillation (H)      Vasculogenic erectile dysfunction, unspecified vasculogenic erectile dysfunction type Yes       CURRENT MEDICATIONS:  Current Outpatient Prescriptions   Medication Sig Dispense Refill     amLODIPine (NORVASC) 5 MG tablet Take 5 mg by mouth daily       amoxicillin (AMOXIL) 500 MG capsule Take 500 mg by mouth Prior to dentist 2000mg       apixaban  ANTICOAGULANT (ELIQUIS) 5 MG tablet        Ascorbic Acid (VITAMIN C) 500 MG CAPS Take 500 mg by mouth daily 500 mg in Am and 1,000 mg in PM       Cholecalciferol (VITAMIN D3) 1000 units CAPS Take 1,000 mg by mouth daily       CRANBERRY CONCENTRATE PO Take 4,200 mg by mouth daily       Flaxseed, Linseed, (FLAXSEED OIL) 1200 MG CAPS Take 2,400 mg by mouth daily       flecainide (TAMBOCOR) 100 MG tablet Take 100 mg by mouth       ketoconazole (NIZORAL) 2 % cream Apply topically daily as needed for itching       levothyroxine (LEVOXYL) 100 MCG tablet Take 100 mcg by mouth daily       magnesium 200 MG TABS Take 250 mg by mouth daily       melatonin 3 MG tablet Take 1 mg by mouth nightly as needed for sleep       Misc Natural Products (GLUCOSAMINE CHOND MSM FORMULA PO) Take by mouth daily       Misc Natural Products (PROSTATE THERAPY COMPLEX) CAPS Take by mouth daily       Multiple Vitamins-Minerals (VITRUM 50+ SENIOR MULTI) TABS Take by mouth daily       Potassium Gluconate 595 MG CAPS Take 595 mg by mouth daily       RaNITidine HCl (RANITIDINE 150 MAX STRENGTH PO) Take 150 mg by mouth daily       sildenafil (VIAGRA) 50 MG tablet Take 1 tablet (50 mg) by mouth daily as needed 30 tablet 3       ALLERGIES   No Known Allergies    PAST MEDICAL HISTORY:  Past Medical History:   Diagnosis Date     Atrial flutter (H)      GERD (gastroesophageal reflux disease)      Hyperlipidemia      Hypertension      Obstructive sleep apnea      Paroxysmal atrial fibrillation (H)      Smoking history     quit 1973       PAST SURGICAL HISTORY:  Past Surgical History:   Procedure Laterality Date     CARDIOVERSION  08/07/2017     COLONOSCOPY  2015     [facility] [place] [place]     HC KNEE SCOPE,MED/LAT MENISCUS REPAIR Left 2016     HERNIA REPAIR  1994     Left atrial appendage ligation  1996     MR PROSTATE  WO & W CONTRAST  2016, 2018     PAROTIDECTOMY  2004    partial parotidectomy     REPAIR VALVE MITRAL  1996  "    SKIN CANCER SCREENING      Skin cancer surgeries-- basal on ear 2008, melanoma on left sideburn 2011, carcinoma right arm 2013     THYROIDECTOMY  2008    partial thyroidectomy       FAMILY HISTORY:  Family History   Problem Relation Age of Onset     Coronary Artery Disease Early Onset Father        SOCIAL HISTORY:  Social History     Social History     Marital status:      Spouse name: N/A     Number of children: N/A     Years of education: N/A     Social History Main Topics     Smoking status: Never Smoker     Smokeless tobacco: Never Used     Alcohol use Yes      Comment: occassional     Drug use: Not on file     Sexual activity: Not on file     Other Topics Concern     Not on file     Social History Narrative       Review of Systems:  Skin:  Negative       Eyes:  Positive for glasses    ENT:  Negative      Respiratory:  Negative       Cardiovascular:  Negative      Gastroenterology: Negative      Genitourinary:  Negative      Musculoskeletal:  Negative      Neurologic:  Negative      Psychiatric:  Negative      Heme/Lymph/Imm:  Negative      Endocrine:  Positive for thyroid disorder      Physical Exam:  Vitals: /60  Pulse (!) 40  Ht 1.753 m (5' 9.02\")  Wt 88 kg (194 lb)  BMI 28.64 kg/m2    Constitutional:  cooperative, alert and oriented, well developed, well nourished, in no acute distress appears younger than stated age      Skin:  warm and dry to the touch, no apparent skin lesions or masses noted          Head:  normocephalic, no masses or lesions        Eyes:  pupils equal and round, conjunctivae and lids unremarkable, sclera white, no xanthalasma, EOMS intact, no nystagmus        Lymph:No Cervical lymphadenopathy present     ENT:  no pallor or cyanosis, dentition good        Neck:  carotid pulses are full and equal bilaterally, JVP normal, no carotid bruit        Respiratory:  normal breath sounds, clear to auscultation, normal A-P diameter, normal symmetry, normal respiratory " excursion, no use of accessory muscles         Cardiac: regular rhythm;normal S1 and S2 bradycardic              pulses full and equal, no bruits auscultated                                        GI:  abdomen soft, non-tender, BS normoactive, no mass, no HSM, no bruits        Extremities and Muscular Skeletal:  no deformities, clubbing, cyanosis, erythema observed              Neurological:  no gross motor deficits        Psych:  Alert and Oriented x 3        CC  Juan Isaac Toure MD  [address]  [place] [id number]

## 2018-08-22 NOTE — PROGRESS NOTES
Service Date: 08/22/2018      HISTORY OF PRESENT ILLNESS:  Thank you for allowing me to participate in the care of this very delightful patient.  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  I had the pleasure of meeting the patient for first time a year ago.  He has been followed at the [facility] in [place] where he spent half a year there.      Prior to last year's visit, the patient was hospitalized for atrial tachycardia with 2:1 AV conduction.  The patient underwent a RUBÉN-guided cardioversion about 3 days later.  The RUBÉN did demonstrate a small residual communication between the left atrium and left atrial appendage and given CHADS-VASc score of 2, I agreed with the recommendation of continuing with his Eliquis.      The patient has been on flecainide 100 mg twice a day along with Toprol 12.5 mg in light of his known sinus bradycardia  Over this past year, doing quite well.  He remains very active, hiking to 2-3 times a week consisting of 2-3 hours each time.  He does admit to feeling short of breath when doing climbing but otherwise denies any lightheadedness or dizziness.  Today's blood pressure was elevated when he walked in at 156/80 but later on, it went down to 120/60.  EKG demonstrates sinus bradycardia, rate about 45 beats per minute.  The QRS width remained the same and within normal range.      Zachariah was noted to have a high PSA level now but the biopsy was negative for cancer.  However, he stated that the MRI was done and was informed that he had a 90% probability of developing prostate cancer later on.  Additionally, he does note that his libido has come down and requests a trial of Viagra if possible.  Certainly, given his normal LV function and no evidence of any active coronary disease, I think it is reasonable for him to be  on Viagra.  The patient also wanted to know whether Flomax can be helpful for his known benign prostatic hypertrophy for which he has some symptoms.  I asked him to talk to you regarding these issues.  I did go over side effects of Viagra including hypotension as well.      Lastly, I did encourage the patient to consider stopping metoprolol, given he does have symptoms of sinus bradycardia and moreover, he is on a very low dose of it.  He can take it on a p.r.n. basis, should he go into atrial tachyarrhythmia with rapid ventricular response.  For now, things are going quite well for him with the current dose of flecainide.  I would like to see Zachariah back in a year.      cc:   Stacy San MD    [facility]   [address]   [place] [address]         KIRBY GANNON MD             D: 2018   T: 2018   MT: DEREK      Name:     ZACHARIAH GIBSON   MRN:      [id number]        Account:      [id number]   :      1945           Service Date: 2018      Document: [id number]

## 2018-08-22 NOTE — TELEPHONE ENCOUNTER
"Pt sent [email or web address] message: \"Dr Toure, Could you send a prescription for generic Viagra, 20 mg to [facility] [address].........  You had prescribed 50mg dose but that was going to cost $600 for 30 pills.  Apparently the generic 20 mg dose is considerably less cost.  Thank you for your assistance today....Johnathon Ram\"    Chart reviewed. Pt saw Dr. Toure for OV earlier today: \"Additionally, he does note that his libido has come down and requests a trial of Viagra if possible.  Certainly, given his normal LV function and no evidence of any active coronary disease, I think it is reasonable for him to be on Viagra.\"    Will review with Dr. Toure.   "

## 2018-08-23 ENCOUNTER — TELEPHONE (OUTPATIENT)
Dept: CARDIOLOGY | Facility: CLINIC | Age: 73
End: 2018-08-23

## 2018-09-24 ENCOUNTER — MYC MEDICAL ADVICE (OUTPATIENT)
Dept: CARDIOLOGY | Facility: CLINIC | Age: 73
End: 2018-09-24

## 2018-09-24 DIAGNOSIS — I10 HTN (HYPERTENSION): Primary | ICD-10-CM

## 2018-09-25 RX ORDER — AMLODIPINE BESYLATE 5 MG/1
5 TABLET ORAL 2 TIMES DAILY
Qty: 180 TABLET | Refills: 3 | Status: SHIPPED | OUTPATIENT
Start: 2018-09-25 | End: 2019-01-04

## 2018-09-25 NOTE — TELEPHONE ENCOUNTER
Pt sent a [employer] message regarding his BP  At our visit 8/22 you suggested that I discontinue the 12.5 mg Metoprolol, but my blood pressure is running high.  The readings range from sys 135-150, stefany 65-80, and pulse 55-65.  I like the faster pulse, vs mid 40's before.  Could my Amlodipine be increased from the current 5 mg, or some other non-beta blocker?   My sys pressures before dropping the metoprolol were in the 125-135 range, which I would like to get back to.      Will review with Dr. Toure.

## 2018-12-09 NOTE — TELEPHONE ENCOUNTER
Tejal, MD Mathew Berkowitz Jodi L RN                   I reviewed RUBÉN which shows flow into previously surgical ligation of BENTON. Continue with Elliquis,.         Spoke to Pt to make aware of Dr Toure interpretation of RUBÉN and the need to continue taking Eliquis  Pt then stated that he was in the donut hole and it was going to cost him $1200, but he was ok. Discussed that there are other NOAC's and if need be that a PA is needed that pt should call the A Fib nurse line. Pt stated that he is going to look into different insurance the will help to lower the cost of his medications. Nataliya    69 year old male with PMH of HTN, Anoxic brain injury from 2008, recent admission for bowel obstruction and resection and anastamosis 3 weeks prior, currently on Heparin  for DVT prophylaxis presenting to ED due to bright red blood per rectum [facility], pt otherwise denies any new abd pain.

## 2018-12-28 ENCOUNTER — TELEPHONE (OUTPATIENT)
Dept: CARDIOLOGY | Facility: CLINIC | Age: 73
End: 2018-12-28

## 2019-01-04 ENCOUNTER — OFFICE VISIT (OUTPATIENT)
Dept: CARDIOLOGY | Facility: CLINIC | Age: 74
End: 2019-01-04
Payer: MEDICARE

## 2019-01-04 VITALS
WEIGHT: 193 LBS | BODY MASS INDEX: 28.58 KG/M2 | HEIGHT: 69 IN | HEART RATE: 52 BPM | SYSTOLIC BLOOD PRESSURE: 149 MMHG | DIASTOLIC BLOOD PRESSURE: 73 MMHG

## 2019-01-04 DIAGNOSIS — I05.9 MITRAL VALVE DISEASE: ICD-10-CM

## 2019-01-04 DIAGNOSIS — I10 BENIGN ESSENTIAL HYPERTENSION: Primary | ICD-10-CM

## 2019-01-04 DIAGNOSIS — R60.0 LOWER EXTREMITY EDEMA: ICD-10-CM

## 2019-01-04 PROCEDURE — 99214 OFFICE O/P EST MOD 30 MIN: CPT | Performed by: PHYSICIAN ASSISTANT

## 2019-01-04 ASSESSMENT — MIFFLIN-ST. JEOR: SCORE: 1610.82

## 2019-01-04 NOTE — LETTER
1/4/2019    Stacy San MD, MD  [address]  [facility] [address]    RE: Zachariah Camarilloeric       Dear Colleague,    I had the pleasure of seeing Zachariah Ram in the [facility].    HPI:   I had the pleasure of seeing Johnatohn and his wife Griselda when he came for follow up of hypertension. He sees Dr. Toure for his history of:    1.  Mitral regurgitation secondary to severe mitral valve prolapse status post Alayna mitral valve annuloplasty 6/1996 at [facility].  2.  Paroxysmal atrial fibrillation for which he underwent ligation of the left atrial appendage at the time of his mitral valve annuloplasty.  Unfortunately, RUBÉN done 2017 did show some communication between the left atrium and left atrial appendage, and he has been placed on anticoagulation.  3.  Atrial tachycardia after he reduced his flecainide from 100-50 mg twice daily and switched his atenolol to a calcium channel blocker.  He underwent RUBÉN guided cardioversion 8/2018 and was started on anticoagulation given his CHADSVASc of 2 (hypertension, age) once RUBÉN showed communication between the left atrium and left atrial appendage.  Subsequently, has been back on flecainide since 8/2017.  No recurrence of atrial arrhythmias noted  4.  Hypertension with recent adjustments in his antihypertensive dosing      Dr. Toure saw Johnathon 8/2018 at which time he recommended discontinuation of metoprolol 12.5 mg daily secondary to significant bradycardia.  Heart rates had typically been in the 30s-40s, and he was getting dyspnea with hiking in [place], where he lives 5 months out of the year.    In 9/2018, he contacted our office noting that blood pressures have been in the 130s-150s over 60s-80s off of metoprolol.  His heart rate had come up nicely to the 50s and 60s, and he was feeling better with this.  For increased blood pressure, Dr. Toure increased his amlodipine to 5 mg twice daily.    Unfortunately, he then contacted our  "office 12/28 noting that for the last 3 months, he has been experiencing increasing lower extremity edema.  He was unsure if it was related to the increase in amlodipine or, as he started Flomax around the same time, if it was related to that.  Therefore, on his own, he discontinued Flomax and restarted metoprolol 12.5 daily.  He also decreased his amlodipine back to his previous dose of 5 mg daily.      Since then, Johnathon tells me that his heart rate has been back into the 40s-50s, his blood pressures have been in the 110-150s with the majority in the 130-140s.  His swelling did improve, and he is now back to his \"normal amount.\"  When his swelling was worse, he did wear his compression stockings, which he thought helped.    He notes that when he had increase in swelling, he had not experienced increased dyspnea, orthopnea or PND.  Denies chest pain, pressure or tightness.  Does not think he said any recurrent atrial arrhythmias, and denies palpitations.  He has not having any trouble on the Eliquis.    Blood work at [facility] 12/2018 showed a creatinine of 1.11.  Last full renal panel 7/2018 showed a sodium of 138, potassium 4.1 and creatinine 1.10.  Hepatic panel at that time was normal.    Transesophageal echocardiogram 8/2017 showed a normal ejection fraction at 60%.  No evidence of left atrial appendage thrombus, with left atrial appendage being previously ligated with a small residual communication between between the left atrium and left atrial appendage.  There was no left atrial appendage thrombus.  He is status post Alayna mitral valve annuloplasty with a mean gradient of 3-4 (heart rate 88 bpm) with mild mitral regurgitation.  EF was 60%.  At that time, he underwent DC cardioversion as well.    Assessment & Plan:    1.  Hypertension    Blood pressure is suboptimal on his current dosing of Toprol-XL 12.5 mg daily and amlodipine 5 mg daily    It appears that lower extremity edema has been worsened by " amlodipine and his bradycardia has continued after resumption of Toprol-XL    PLAN:    Discontinue amlodipine 5 mg daily    In 1 week, contact me to review weight, lower extremity edema and blood pressures.  At that time, will likely discontinue metoprolol XL due to bradycardia and initiate lisinopril for hypertension    See me 2 weeks before leaving for [place] with a BMP to assure lisinopril is working well.      2.  Atrial arrhythmias    Had a history of paroxysmal atrial fibrillation and underwent left atrial appendage ligation at the time of his mitral valve surgery in 1996.  Unfortunately, communication persisted, and he is on Eliquis for his CHADSVASc of 2 (hypertension, age)    Back on flecainide 100 mg twice daily and has not had breakthrough arrhythmias    Dr. Toure recommended discontinuation of metoprolol given symptomatic bradycardia, the patient restarted this as above    PLAN:    We will likely discontinue metoprolol succinate when he contacts me in 1 week    There is a small chance of 1-1 atrial flutter/tachycardia without a post flecainide, but heart rates are quite low with that and he is symptomatic and Dr. Toure thought discontinuing this would be reasonable    Continue anticoagulation    3.  Prostate issues    PSA is up to 9.4.  This is [facility] at [facility]    Biopsies/MRIs have shown no cancer, but atypical cells which requires routine six-month follow-up    PLAN:    Likely will need to resume Flomax down the road      Nakita Gayle PA-C, MSPAS      Orders Placed This Encounter   Procedures     Basic metabolic panel     Follow-Up with Cardiac Advanced Practice Provider     No orders of the defined types were placed in this encounter.    Medications Discontinued During This Encounter   Medication Reason     CRANBERRY CONCENTRATE PO Discontinued by another Health Care Provider     Misc Natural Products (PROSTATE THERAPY COMPLEX) CAPS Discontinued by another Health Care Provider     TAMSULOSIN HCL  PO Discontinued by another Health Care Provider     amLODIPine (NORVASC) 5 MG tablet          Encounter Diagnosis   Name Primary?     Benign essential hypertension Yes       CURRENT MEDICATIONS:  Current Outpatient Medications   Medication Sig Dispense Refill     amoxicillin (AMOXIL) 500 MG capsule Take 500 mg by mouth Prior to dentist 2000mg       apixaban ANTICOAGULANT (ELIQUIS) 5 MG tablet Take by mouth 2 times daily        Ascorbic Acid (VITAMIN C) 500 MG CAPS Take 500 mg by mouth daily 500 mg in Am and 1,000 mg in PM       Cholecalciferol (VITAMIN D3) 1000 units CAPS Take 1,000 mg by mouth daily       Flaxseed, Linseed, (FLAXSEED OIL) 1200 MG CAPS Take 2,400 mg by mouth daily       flecainide (TAMBOCOR) 100 MG tablet Take 100 mg by mouth 2 times daily        ketoconazole (NIZORAL) 2 % cream Apply topically daily as needed for itching       levothyroxine (LEVOXYL) 100 MCG tablet Take 100 mcg by mouth daily       magnesium 200 MG TABS Take 250 mg by mouth daily       melatonin 3 MG tablet Take 5 mg by mouth nightly as needed for sleep        Misc Natural Products (GLUCOSAMINE CHOND MSM FORMULA PO) Take by mouth daily       Multiple Vitamins-Minerals (VITRUM 50+ SENIOR MULTI) TABS Take by mouth daily       Potassium Gluconate 595 MG CAPS Take 595 mg by mouth daily       RaNITidine HCl (RANITIDINE 150 MAX STRENGTH PO) Take 150 mg by mouth daily       sildenafil (REVATIO) 20 MG tablet Take 3 tablets (20 mg) by mouth daily as needed for erectile dysfunction 90 tablet 3       ALLERGIES   No Known Allergies    PAST MEDICAL HISTORY:  Past Medical History:   Diagnosis Date     Atrial flutter (H)      GERD (gastroesophageal reflux disease)      Hyperlipidemia      Hypertension      Obstructive sleep apnea      Paroxysmal atrial fibrillation (H)      Smoking history     quit 1973       PAST SURGICAL HISTORY:  Past Surgical History:   Procedure Laterality Date     CARDIOVERSION  08/07/2017     COLONOSCOPY  2015     at [place]  [facility] ([facility], [place])     HC KNEE SCOPE,MED/LAT MENISCUS REPAIR Left 2016     HERNIA REPAIR  1994     Left atrial appendage ligation  1996     MR PROSTATE  WO & W CONTRAST  2016, 2018     PAROTIDECTOMY  2004    partial parotidectomy     REPAIR VALVE MITRAL  1996     SKIN CANCER SCREENING      Skin cancer surgeries-- basal on ear 2008, melanoma on left sideburn 2011, carcinoma right arm 2013     THYROIDECTOMY  2008    partial thyroidectomy       FAMILY HISTORY:  Family History   Problem Relation Age of Onset     Coronary Artery Disease Early Onset Father        SOCIAL HISTORY:  Social History     Socioeconomic History     Marital status:      Spouse name: None     Number of children: None     Years of education: None     Highest education level: None   Social Needs     Financial resource strain: None     Food insecurity - worry: None     Food insecurity - inability: None     Transportation needs - medical: None     Transportation needs - non-medical: None   Occupational History     None   Tobacco Use     Smoking status: Never Smoker     Smokeless tobacco: Never Used   Substance and Sexual Activity     Alcohol use: Yes     Comment: occassional     Drug use: None     Sexual activity: None   Other Topics Concern     Parent/sibling w/ CABG, MI or angioplasty before 65F 55M? Not Asked   Social History Narrative     None       Review of Systems:  Skin:  Negative     Eyes:  Positive for glasses  ENT:  Negative    Respiratory:  Negative for shortness of breath;dyspnea on exertion;cough  Cardiovascular:  Negative for;palpitations;chest pain;fatigue;lightheadedness;dizziness Positive for;edema  Gastroenterology: Negative for melena;hematochezia  Genitourinary:  Positive for prostate problem  Musculoskeletal:  Negative    Neurologic:  Negative    Psychiatric:  Negative    Heme/Lymph/Imm:  Negative    Endocrine:  Positive for thyroid disorder    Physical Exam:  Vitals: /73   Pulse 52   Ht  "1.753 m (5' 9\")   Wt 87.5 kg (193 lb)   BMI 28.50 kg/m       Constitutional:  cooperative, alert and oriented, well developed, well nourished, in no acute distress appears younger than stated age      Skin:  warm and dry to the touch, no apparent skin lesions or masses noted        Head:  normocephalic, no masses or lesions        Eyes:  pupils equal and round, conjunctivae and lids unremarkable, sclera white, no xanthalasma, EOMS intact, no nystagmus        ENT:  no pallor or cyanosis, dentition good        Neck:  JVP normal;no carotid bruit        Chest:  normal breath sounds, clear to auscultation, normal A-P diameter, normal symmetry, normal respiratory excursion, no use of accessory muscles        Cardiac: regular rhythm;normal S1 and S2 bradycardic                Abdomen:  abdomen soft        Vascular: pulses full and equal                                      Extremities and Back:  no deformities, clubbing, cyanosis, erythema observed   Bilateral LE edema above sock, 1+    Neurological:  no gross motor deficits          CC  Stacy San MD  [facility] [address] [place] [address]                    Thank you for allowing me to participate in the care of your patient.      Sincerely,     Sera Gayle PA-C     [facility]    cc:   Stacy San MD  [facility] [address] [place], [place] [address]        "

## 2019-01-04 NOTE — LETTER
1/4/2019    Stacy San MD, MD  [employer] [address]  [facility] [address]    RE: Zachariah Camarilloeric       Dear Colleague,    I had the pleasure of seeing Zachariah Ram in the [facility].    HPI:   I had the pleasure of seeing Johnathon and his wife Griselda when he came for follow up of hypertension. He sees Dr. Toure for his history of:    1.  Mitral regurgitation secondary to severe mitral valve prolapse status post Alayna mitral valve annuloplasty 6/1996 at [facility].  2.  Paroxysmal atrial fibrillation for which he underwent ligation of the left atrial appendage at the time of his mitral valve annuloplasty.  Unfortunately, RUBÉN done 2017 did show some communication between the left atrium and left atrial appendage, and he has been placed on anticoagulation.  3.  Atrial tachycardia after he reduced his flecainide from 100-50 mg twice daily and switched his atenolol to a calcium channel blocker.  He underwent RUBÉN guided cardioversion 8/2018 and was started on anticoagulation given his CHADSVASc of 2 (hypertension, age) once RUBÉN showed communication between the left atrium and left atrial appendage.  Subsequently, has been back on flecainide since 8/2017.  No recurrence of atrial arrhythmias noted  4.  Hypertension with recent adjustments in his antihypertensive dosing      Dr. Toure saw Johnathon 8/2018 at which time he recommended discontinuation of metoprolol 12.5 mg daily secondary to significant bradycardia.  Heart rates had typically been in the 30s-40s, and he was getting dyspnea with hiking in [place], where he lives 5 months out of the year.    In 9/2018, he contacted our office noting that blood pressures have been in the 130s-150s over 60s-80s off of metoprolol.  His heart rate had come up nicely to the 50s and 60s, and he was feeling better with this.  For increased blood pressure, Dr. Toure increased his amlodipine to 5 mg twice daily.    Unfortunately, he then contacted our  "office 12/28 noting that for the last 3 months, he has been experiencing increasing lower extremity edema.  He was unsure if it was related to the increase in amlodipine or, as he started Flomax around the same time, if it was related to that.  Therefore, on his own, he discontinued Flomax and restarted metoprolol 12.5 daily.  He also decreased his amlodipine back to his previous dose of 5 mg daily.      Since then, Johnathon tells me that his heart rate has been back into the 40s-50s, his blood pressures have been in the 110-150s with the majority in the 130-140s.  His swelling did improve, and he is now back to his \"normal amount.\"  When his swelling was worse, he did wear his compression stockings, which he thought helped.    He notes that when he had increase in swelling, he had not experienced increased dyspnea, orthopnea or PND.  Denies chest pain, pressure or tightness.  Does not think he said any recurrent atrial arrhythmias, and denies palpitations.  He has not having any trouble on the Eliquis.    Blood work at [facility] 12/2018 showed a creatinine of 1.11.  Last full renal panel 7/2018 showed a sodium of 138, potassium 4.1 and creatinine 1.10.  Hepatic panel at that time was normal.    Transesophageal echocardiogram 8/2017 showed a normal ejection fraction at 60%.  No evidence of left atrial appendage thrombus, with left atrial appendage being previously ligated with a small residual communication between between the left atrium and left atrial appendage.  There was no left atrial appendage thrombus.  He is status post Alayna mitral valve annuloplasty with a mean gradient of 3-4 (heart rate 88 bpm) with mild mitral regurgitation.  EF was 60%.  At that time, he underwent DC cardioversion as well.    Assessment & Plan:    1.  Hypertension    Blood pressure is suboptimal on his current dosing of Toprol-XL 12.5 mg daily and amlodipine 5 mg daily    It appears that lower extremity edema has been worsened by " amlodipine and his bradycardia has continued after resumption of Toprol-XL    PLAN:    Discontinue amlodipine 5 mg daily    In 1 week, contact me to review weight, lower extremity edema and blood pressures.  At that time, will likely discontinue metoprolol XL due to bradycardia and initiate lisinopril for hypertension    See me 2 weeks before leaving for [place] with a BMP to assure lisinopril is working well.      2.  Atrial arrhythmias    Had a history of paroxysmal atrial fibrillation and underwent left atrial appendage ligation at the time of his mitral valve surgery in 1996.  Unfortunately, communication persisted, and he is on Eliquis for his CHADSVASc of 2 (hypertension, age)    Back on flecainide 100 mg twice daily and has not had breakthrough arrhythmias    Dr. Toure recommended discontinuation of metoprolol given symptomatic bradycardia, the patient restarted this as above    PLAN:    We will likely discontinue metoprolol succinate when he contacts me in 1 week    There is a small chance of 1-1 atrial flutter/tachycardia without a post flecainide, but heart rates are quite low with that and he is symptomatic and Dr. Toure thought discontinuing this would be reasonable    Continue anticoagulation    3.  Prostate issues    PSA is up to 9.4.  This is followed at [facility]    Biopsies/MRIs have shown no cancer, but atypical cells which requires routine six-month follow-up    PLAN:    Likely will need to resume Flomax down the road      Nakita Gayle PA-C, MSPAS      Orders Placed This Encounter   Procedures     Basic metabolic panel     Follow-Up with Cardiac Advanced Practice Provider     No orders of the defined types were placed in this encounter.    Medications Discontinued During This Encounter   Medication Reason     CRANBERRY CONCENTRATE PO Discontinued by another Health Care Provider     Misc Natural Products (PROSTATE THERAPY COMPLEX) CAPS Discontinued by another Health Care Provider     TAMSULOSIN HCL  PO Discontinued by another Health Care Provider     amLODIPine (NORVASC) 5 MG tablet          Encounter Diagnosis   Name Primary?     Benign essential hypertension Yes       CURRENT MEDICATIONS:  Current Outpatient Medications   Medication Sig Dispense Refill     amoxicillin (AMOXIL) 500 MG capsule Take 500 mg by mouth Prior to dentist 2000mg       apixaban ANTICOAGULANT (ELIQUIS) 5 MG tablet Take by mouth 2 times daily        Ascorbic Acid (VITAMIN C) 500 MG CAPS Take 500 mg by mouth daily 500 mg in Am and 1,000 mg in PM       Cholecalciferol (VITAMIN D3) 1000 units CAPS Take 1,000 mg by mouth daily       Flaxseed, Linseed, (FLAXSEED OIL) 1200 MG CAPS Take 2,400 mg by mouth daily       flecainide (TAMBOCOR) 100 MG tablet Take 100 mg by mouth 2 times daily        ketoconazole (NIZORAL) 2 % cream Apply topically daily as needed for itching       levothyroxine (LEVOXYL) 100 MCG tablet Take 100 mcg by mouth daily       magnesium 200 MG TABS Take 250 mg by mouth daily       melatonin 3 MG tablet Take 5 mg by mouth nightly as needed for sleep        Misc Natural Products (GLUCOSAMINE CHOND MSM FORMULA PO) Take by mouth daily       Multiple Vitamins-Minerals (VITRUM 50+ SENIOR MULTI) TABS Take by mouth daily       Potassium Gluconate 595 MG CAPS Take 595 mg by mouth daily       RaNITidine HCl (RANITIDINE 150 MAX STRENGTH PO) Take 150 mg by mouth daily       sildenafil (REVATIO) 20 MG tablet Take 3 tablets (20 mg) by mouth daily as needed for erectile dysfunction 90 tablet 3       ALLERGIES   No Known Allergies    PAST MEDICAL HISTORY:  Past Medical History:   Diagnosis Date     Atrial flutter (H)      GERD (gastroesophageal reflux disease)      Hyperlipidemia      Hypertension      Obstructive sleep apnea      Paroxysmal atrial fibrillation (H)      Smoking history     quit 1973       PAST SURGICAL HISTORY:  Past Surgical History:   Procedure Laterality Date     CARDIOVERSION  08/07/2017     COLONOSCOPY  2015     at [facility] ([place], [place])     HC KNEE SCOPE,MED/LAT MENISCUS REPAIR Left 2016     HERNIA REPAIR  1994     Left atrial appendage ligation  1996     MR PROSTATE  WO & W CONTRAST  2016, 2018     PAROTIDECTOMY  2004    partial parotidectomy     REPAIR VALVE MITRAL  1996     SKIN CANCER SCREENING      Skin cancer surgeries-- basal on ear 2008, melanoma on left sideburn 2011, carcinoma right arm 2013     THYROIDECTOMY  2008    partial thyroidectomy       FAMILY HISTORY:  Family History   Problem Relation Age of Onset     Coronary Artery Disease Early Onset Father        SOCIAL HISTORY:  Social History     Socioeconomic History     Marital status:      Spouse name: None     Number of children: None     Years of education: None     Highest education level: None   Social Needs     Financial resource strain: None     Food insecurity - worry: None     Food insecurity - inability: None     Transportation needs - medical: None     Transportation needs - non-medical: None   Occupational History     None   Tobacco Use     Smoking status: Never Smoker     Smokeless tobacco: Never Used   Substance and Sexual Activity     Alcohol use: Yes     Comment: occassional     Drug use: None     Sexual activity: None   Other Topics Concern     Parent/sibling w/ CABG, MI or angioplasty before 65F 55M? Not Asked   Social History Narrative     None       Review of Systems:  Skin:  Negative     Eyes:  Positive for glasses  ENT:  Negative    Respiratory:  Negative for shortness of breath;dyspnea on exertion;cough  Cardiovascular:  Negative for;palpitations;chest pain;fatigue;lightheadedness;dizziness Positive for;edema  Gastroenterology: Negative for melena;hematochezia  Genitourinary:  Positive for prostate problem  Musculoskeletal:  Negative    Neurologic:  Negative    Psychiatric:  Negative    Heme/Lymph/Imm:  Negative    Endocrine:  Positive for thyroid disorder    Physical Exam:  Vitals: /73   Pulse 52   Ht  "1.753 m (5' 9\")   Wt 87.5 kg (193 lb)   BMI 28.50 kg/m       Constitutional:  cooperative, alert and oriented, well developed, well nourished, in no acute distress appears younger than stated age      Skin:  warm and dry to the touch, no apparent skin lesions or masses noted        Head:  normocephalic, no masses or lesions        Eyes:  pupils equal and round, conjunctivae and lids unremarkable, sclera white, no xanthalasma, EOMS intact, no nystagmus        ENT:  no pallor or cyanosis, dentition good        Neck:  JVP normal;no carotid bruit        Chest:  normal breath sounds, clear to auscultation, normal A-P diameter, normal symmetry, normal respiratory excursion, no use of accessory muscles        Cardiac: regular rhythm;normal S1 and S2 bradycardic                Abdomen:  abdomen soft        Vascular: pulses full and equal                                      Extremities and Back:  no deformities, clubbing, cyanosis, erythema observed   Bilateral LE edema above sock, 1+    Neurological:  no gross motor deficits        Thank you for allowing me to participate in the care of your patient.    Sincerely,     Sera Gayle PA-C     [facility] [place]  "

## 2019-01-04 NOTE — PROGRESS NOTES
HPI:   I had the pleasure of seeing Johnathon and his wife Griselda when he came for follow up of hypertension. He sees Dr. Toure for his history of:    1.  Mitral regurgitation secondary to severe mitral valve prolapse status post Alayna mitral valve annuloplasty 6/1996 at [facility].  2.  Paroxysmal atrial fibrillation for which he underwent ligation of the left atrial appendage at the time of his mitral valve annuloplasty.  Unfortunately, RUBÉN done 2017 did show some communication between the left atrium and left atrial appendage, and he has been placed on anticoagulation.  3.  Atrial tachycardia after he reduced his flecainide from 100-50 mg twice daily and switched his atenolol to a calcium channel blocker.  He underwent RUBÉN guided cardioversion 8/2018 and was started on anticoagulation given his CHADSVASc of 2 (hypertension, age) once RUBÉN showed communication between the left atrium and left atrial appendage.  Subsequently, has been back on flecainide since 8/2017.  No recurrence of atrial arrhythmias noted  4.  Hypertension with recent adjustments in his antihypertensive dosing      Dr. Toure saw Johnathon 8/2018 at which time he recommended discontinuation of metoprolol 12.5 mg daily secondary to significant bradycardia.  Heart rates had typically been in the 30s-40s, and he was getting dyspnea with hiking in [place], where he lives 5 months out of the year.    In 9/2018, he contacted our office noting that blood pressures have been in the 130s-150s over 60s-80s off of metoprolol.  His heart rate had come up nicely to the 50s and 60s, and he was feeling better with this.  For increased blood pressure, Dr. Toure increased his amlodipine to 5 mg twice daily.    Unfortunately, he then contacted our office 12/28 noting that for the last 3 months, he has been experiencing increasing lower extremity edema.  He was unsure if it was related to the increase in amlodipine or, as he started Flomax around the same time, if it was  "related to that.  Therefore, on his own, he discontinued Flomax and restarted metoprolol 12.5 daily.  He also decreased his amlodipine back to his previous dose of 5 mg daily.      Since then, Johnathon tells me that his heart rate has been back into the 40s-50s, his blood pressures have been in the 110-150s with the majority in the 130-140s.  His swelling did improve, and he is now back to his \"normal amount.\"  When his swelling was worse, he did wear his compression stockings, which he thought helped.    He notes that when he had increase in swelling, he had not experienced increased dyspnea, orthopnea or PND.  Denies chest pain, pressure or tightness.  Does not think he said any recurrent atrial arrhythmias, and denies palpitations.  He has not having any trouble on the Eliquis.    Blood work at [facility] 12/2018 showed a creatinine of 1.11.  Last full renal panel 7/2018 showed a sodium of 138, potassium 4.1 and creatinine 1.10.  Hepatic panel at that time was normal.    Transesophageal echocardiogram 8/2017 showed a normal ejection fraction at 60%.  No evidence of left atrial appendage thrombus, with left atrial appendage being previously ligated with a small residual communication between between the left atrium and left atrial appendage.  There was no left atrial appendage thrombus.  He is status post Alayna mitral valve annuloplasty with a mean gradient of 3-4 (heart rate 88 bpm) with mild mitral regurgitation.  EF was 60%.  At that time, he underwent DC cardioversion as well.    Assessment & Plan:    1.  Hypertension    Blood pressure is suboptimal on his current dosing of Toprol-XL 12.5 mg daily and amlodipine 5 mg daily    It appears that lower extremity edema has been worsened by amlodipine and his bradycardia has continued after resumption of Toprol-XL    PLAN:    Discontinue amlodipine 5 mg daily    In 1 week, contact me to review weight, lower extremity edema and blood pressures.  At that time, will " likely discontinue metoprolol XL due to bradycardia and initiate lisinopril for hypertension    See me 2 weeks before leaving for [place] with a BMP to assure lisinopril is working well.      2.  Atrial arrhythmias    Had a history of paroxysmal atrial fibrillation and underwent left atrial appendage ligation at the time of his mitral valve surgery in 1996.  Unfortunately, communication persisted, and he is on Eliquis for his CHADSVASc of 2 (hypertension, age)    Back on flecainide 100 mg twice daily and has not had breakthrough arrhythmias    Dr. Toure recommended discontinuation of metoprolol given symptomatic bradycardia, the patient restarted this as above    PLAN:    We will likely discontinue metoprolol succinate when he contacts me in 1 week    There is a small chance of 1-1 atrial flutter/tachycardia without a post flecainide, but heart rates are quite low with that and he is symptomatic and Dr. Toure thought discontinuing this would be reasonable    Continue anticoagulation    3.  Prostate issues    PSA is up to 9.4.  This is [facility] at [facility]    Biopsies/MRIs have shown no cancer, but atypical cells which requires routine six-month follow-up    PLAN:    Likely will need to resume Flomax down the road      Nakita Gayle PA-C, MSPAS      Orders Placed This Encounter   Procedures     Basic metabolic panel     Follow-Up with Cardiac Advanced Practice Provider     No orders of the defined types were placed in this encounter.    Medications Discontinued During This Encounter   Medication Reason     CRANBERRY CONCENTRATE PO Discontinued by another Health Care Provider     Misc Natural Products (PROSTATE THERAPY COMPLEX) CAPS Discontinued by another Health Care Provider     TAMSULOSIN HCL PO Discontinued by another Health Care Provider     amLODIPine (NORVASC) 5 MG tablet          Encounter Diagnosis   Name Primary?     Benign essential hypertension Yes       CURRENT MEDICATIONS:  Current Outpatient Medications    Medication Sig Dispense Refill     amoxicillin (AMOXIL) 500 MG capsule Take 500 mg by mouth Prior to dentist 2000mg       apixaban ANTICOAGULANT (ELIQUIS) 5 MG tablet Take by mouth 2 times daily        Ascorbic Acid (VITAMIN C) 500 MG CAPS Take 500 mg by mouth daily 500 mg in Am and 1,000 mg in PM       Cholecalciferol (VITAMIN D3) 1000 units CAPS Take 1,000 mg by mouth daily       Flaxseed, Linseed, (FLAXSEED OIL) 1200 MG CAPS Take 2,400 mg by mouth daily       flecainide (TAMBOCOR) 100 MG tablet Take 100 mg by mouth 2 times daily        ketoconazole (NIZORAL) 2 % cream Apply topically daily as needed for itching       levothyroxine (LEVOXYL) 100 MCG tablet Take 100 mcg by mouth daily       magnesium 200 MG TABS Take 250 mg by mouth daily       melatonin 3 MG tablet Take 5 mg by mouth nightly as needed for sleep        Misc Natural Products (GLUCOSAMINE CHOND MSM FORMULA PO) Take by mouth daily       Multiple Vitamins-Minerals (VITRUM 50+ SENIOR MULTI) TABS Take by mouth daily       Potassium Gluconate 595 MG CAPS Take 595 mg by mouth daily       RaNITidine HCl (RANITIDINE 150 MAX STRENGTH PO) Take 150 mg by mouth daily       sildenafil (REVATIO) 20 MG tablet Take 3 tablets (20 mg) by mouth daily as needed for erectile dysfunction 90 tablet 3       ALLERGIES   No Known Allergies    PAST MEDICAL HISTORY:  Past Medical History:   Diagnosis Date     Atrial flutter (H)      GERD (gastroesophageal reflux disease)      Hyperlipidemia      Hypertension      Obstructive sleep apnea      Paroxysmal atrial fibrillation (H)      Smoking history     quit 1973       PAST SURGICAL HISTORY:  Past Surgical History:   Procedure Laterality Date     CARDIOVERSION  08/07/2017     COLONOSCOPY  2015     at [facility] [place], [place]     HC KNEE SCOPE,MED/LAT MENISCUS REPAIR Left 2016     HERNIA REPAIR  1994     Left atrial appendage ligation  1996     MR PROSTATE  WO & W CONTRAST  2016, 2018      "PAROTIDECTOMY  2004    partial parotidectomy     REPAIR VALVE MITRAL  1996     SKIN CANCER SCREENING      Skin cancer surgeries-- basal on ear 2008, melanoma on left sideburn 2011, carcinoma right arm 2013     THYROIDECTOMY  2008    partial thyroidectomy       FAMILY HISTORY:  Family History   Problem Relation Age of Onset     Coronary Artery Disease Early Onset Father        SOCIAL HISTORY:  Social History     Socioeconomic History     Marital status:      Spouse name: None     Number of children: None     Years of education: None     Highest education level: None   Social Needs     Financial resource strain: None     Food insecurity - worry: None     Food insecurity - inability: None     Transportation needs - medical: None     Transportation needs - non-medical: None   Occupational History     None   Tobacco Use     Smoking status: Never Smoker     Smokeless tobacco: Never Used   Substance and Sexual Activity     Alcohol use: Yes     Comment: occassional     Drug use: None     Sexual activity: None   Other Topics Concern     Parent/sibling w/ CABG, MI or angioplasty before 65F 55M? Not Asked   Social History Narrative     None       Review of Systems:  Skin:  Negative     Eyes:  Positive for glasses  ENT:  Negative    Respiratory:  Negative for shortness of breath;dyspnea on exertion;cough  Cardiovascular:  Negative for;palpitations;chest pain;fatigue;lightheadedness;dizziness Positive for;edema  Gastroenterology: Negative for melena;hematochezia  Genitourinary:  Positive for prostate problem  Musculoskeletal:  Negative    Neurologic:  Negative    Psychiatric:  Negative    Heme/Lymph/Imm:  Negative    Endocrine:  Positive for thyroid disorder    Physical Exam:  Vitals: /73   Pulse 52   Ht 1.753 m (5' 9\")   Wt 87.5 kg (193 lb)   BMI 28.50 kg/m      Constitutional:  cooperative, alert and oriented, well developed, well nourished, in no acute distress appears younger than stated age      Skin:  warm " and dry to the touch, no apparent skin lesions or masses noted        Head:  normocephalic, no masses or lesions        Eyes:  pupils equal and round, conjunctivae and lids unremarkable, sclera white, no xanthalasma, EOMS intact, no nystagmus        ENT:  no pallor or cyanosis, dentition good        Neck:  JVP normal;no carotid bruit        Chest:  normal breath sounds, clear to auscultation, normal A-P diameter, normal symmetry, normal respiratory excursion, no use of accessory muscles        Cardiac: regular rhythm;normal S1 and S2 bradycardic                Abdomen:  abdomen soft        Vascular: pulses full and equal                                      Extremities and Back:  no deformities, clubbing, cyanosis, erythema observed   Bilateral LE edema above sock, 1+    Neurological:  no gross motor deficits          CC  Stacy San MD  [employer]  [address]  [place] [address]

## 2019-01-04 NOTE — PATIENT INSTRUCTIONS
1. Discussed elevated blood pressures and lower extremity swelling. I think the swelling may be due to amlodipine and gravity.    2. As discussed, will HOLD amlodipine 5 mg daily.  Continue the metoprolol 12.5 mg for now.    3. Call me/Phillipt me on Wednesday with update on swelling, weight and BP.  At that time, I will likely stop metoprolol XL 12.5 mg daily and start a different BP medication that does not lower the heart rate or cause swelling    3. I'll see you one more time before you leave to make sure BP is under control, swelling is better and kidney function is under control!    4. My nurses are Tesha/Pat: [phone]

## 2019-01-08 ENCOUNTER — MYC MEDICAL ADVICE (OUTPATIENT)
Dept: CARDIOLOGY | Facility: CLINIC | Age: 74
End: 2019-01-08

## 2019-01-08 DIAGNOSIS — I10 BENIGN ESSENTIAL HYPERTENSION: Primary | ICD-10-CM

## 2019-01-10 RX ORDER — LISINOPRIL 5 MG/1
5 TABLET ORAL 2 TIMES DAILY
Qty: 60 TABLET | Refills: 3 | Status: SHIPPED | OUTPATIENT
Start: 2019-01-10 | End: 2019-01-25

## 2019-01-10 NOTE — TELEPHONE ENCOUNTER
Marcial for update, Radha  So glad that edema has improved since stopping amlodipine.    I agree that BPs are a bit too high after stopping amlodipine. HR remains a bit low.      * STOP Metoprolol XL 12.5 mg.    * Start lisinopril 5 mg daily twice daily. This is a BP med that does not affect swelling or HR but could affect kidneys (which is why I'm getting the blood work Monday).    I have sent this Rx (30 day supply with refills) to the [facility]. We can send 90 day Rx to [facility] once we know you tolerate it.    See you Monday!  Nakita

## 2019-01-14 ENCOUNTER — OFFICE VISIT (OUTPATIENT)
Dept: CARDIOLOGY | Facility: CLINIC | Age: 74
End: 2019-01-14
Payer: MEDICARE

## 2019-01-14 VITALS
SYSTOLIC BLOOD PRESSURE: 130 MMHG | HEART RATE: 57 BPM | WEIGHT: 197.1 LBS | DIASTOLIC BLOOD PRESSURE: 65 MMHG | HEIGHT: 69 IN | BODY MASS INDEX: 29.19 KG/M2

## 2019-01-14 DIAGNOSIS — I10 BENIGN ESSENTIAL HYPERTENSION: Primary | ICD-10-CM

## 2019-01-14 DIAGNOSIS — Z98.890 H/O MITRAL VALVE REPAIR: ICD-10-CM

## 2019-01-14 DIAGNOSIS — I10 BENIGN ESSENTIAL HYPERTENSION: ICD-10-CM

## 2019-01-14 LAB
ANION GAP SERPL CALCULATED.3IONS-SCNC: 8.2 MMOL/L (ref 6–17)
BUN SERPL-MCNC: 18 MG/DL (ref 7–30)
CALCIUM SERPL-MCNC: 8.9 MG/DL (ref 8.5–10.5)
CHLORIDE SERPL-SCNC: 102 MMOL/L (ref 98–107)
CO2 SERPL-SCNC: 29 MMOL/L (ref 23–29)
CREAT SERPL-MCNC: 1.16 MG/DL (ref 0.7–1.3)
GFR SERPL CREATININE-BSD FRML MDRD: 62 ML/MIN/{1.73_M2}
GLUCOSE SERPL-MCNC: 126 MG/DL (ref 70–105)
POTASSIUM SERPL-SCNC: 4.2 MMOL/L (ref 3.5–5.1)
SODIUM SERPL-SCNC: 135 MMOL/L (ref 136–145)

## 2019-01-14 PROCEDURE — 80048 BASIC METABOLIC PNL TOTAL CA: CPT | Performed by: PHYSICIAN ASSISTANT

## 2019-01-14 PROCEDURE — 36415 COLL VENOUS BLD VENIPUNCTURE: CPT | Performed by: PHYSICIAN ASSISTANT

## 2019-01-14 PROCEDURE — 99213 OFFICE O/P EST LOW 20 MIN: CPT | Performed by: PHYSICIAN ASSISTANT

## 2019-01-14 ASSESSMENT — MIFFLIN-ST. JEOR: SCORE: 1629.42

## 2019-01-14 NOTE — PROGRESS NOTES
HPI:   I had the pleasure of seeing Johnathon when he came for follow up of hypertension and recent medication changes.  He sees Dr. Touer for his history of:    1.  Hypertension with recent adjustments in his antihypertensive dosing    2.  Paroxysmal atrial fibrillation for which he underwent ligation of the left atrial appendage at the time of his mitral valve annuloplasty.  Unfortunately, RUBÉN done 2017 did show some communication between the left atrium and left atrial appendage, and he has been placed on anticoagulation.    3.  Atrial tachycardia after he reduced his flecainide from 100-50 mg twice daily and switched his atenolol to a calcium channel blocker.  He underwent RUBÉN guided cardioversion 8/2018 and was started on anticoagulation given his CHADSVASc of 2 (hypertension, age) once RUBÉN showed communication between the left atrium and left atrial appendage.  Subsequently, has been back on flecainide since 8/2017.  No recurrence of atrial arrhythmias noted    4. Mitral regurgitation secondary to severe mitral valve prolapse status post Alayna mitral valve annuloplasty 6/1996 at [facility].    I saw Johnathon today to review recent medication changes.  When I last saw him 1/4, he was complaining of lower extremity edema.  I suspected that was secondary to amlodipine and had him discontinue this.  A week later, I asked him to send me a My Chart message regarding his edema and blood pressures.  We discontinued metoprolol XL 12.5 mg daily at that time given bradycardia.  Dr. Toure had previously tried to do this, but the patient restarted it when his blood pressure started to climb.  Finally, I added lisinopril 5 mg twice daily and asked him to see me back today with a BMP prior to his trip to [place].    Overall, he has felt well.  His edema is gone, and he states that he has more energy off of metoprolol.  He is tolerating lisinopril 5 mg twice daily without issues, though does note that he had some lightheadedness  after standing after driving an hour and a half.  He did not fall or faint.  He did not check his blood pressure, and states that the symptoms resolved.  This is only happened at one time, and does not have been subsequent.    He does note a reddened area on the right lateral foot near the baby toe.  It looks a bit like a tailor's bunion, and he states that he has been wearing tighter/stiffer shoes while working out on the treadmill as he prepares for hiking season in [place].    BMP today on lisinopril 5 mg BID looked fine with mild hyponatremia 135, potassium 4.2, BUN 18 and creatinine 1.16.  In 12/2018, creatinine was 1.10 at [facility].     Transesophageal echocardiogram 8/2017 showed a normal ejection fraction at 60%.  No evidence of left atrial appendage thrombus, with left atrial appendage being previously ligated with a small residual communication between between the left atrium and left atrial appendage.  There was no left atrial appendage thrombus.  He is status post Alayna mitral valve annuloplasty with a mean gradient of 3-4 (heart rate 88 bpm) with mild mitral regurgitation.  EF was 60%.  At that time, he underwent DC cardioversion as well.    Assessment & Plan:    1.  Hypertension    His blood pressure looks much better on lisinopril 5 mg twice daily.  He did have one episode of lightheadedness after sitting for a prolonged period of time and driving, but has not had any issues with this since    Amlodipine has been added to his intolerance list given the side effect of lower extremity swelling at 5 mg daily    PLAN:    Continue lisinopril    Keep checking blood pressure, and contact us if they increase    BMP when he returns from [place]      2.  Atrial fibrillation and history of atrial tachycardia    He was in sinus rhythm on exam, and his last EKG when I saw him last week confirms sinus rhythm.      He remains on flecainide with good results.  Despite the small risk of 1-1 atrial flutter, we  discontinued his metoprolol with Dr. Toure's recommendation secondary to bradycardia with some fatigue    He is feeling better off of the metoprolol    PLAN:    Continue flecainide 100 mg twice daily    Limit alcohol    Continue anticoagulation with Eliquis    Dr. Toure will see him again 8/2019 as planned I have encouraged him to contact us in the interim can we be of assistance. We will repeat an echocardiogram at that time due to his history of mitral valve disease.        Nakita Gayle PA-C, MSPAS      Orders Placed This Encounter   Procedures     Basic metabolic panel     Echocardiogram Complete     No orders of the defined types were placed in this encounter.    There are no discontinued medications.      Encounter Diagnoses   Name Primary?     Benign essential hypertension Yes     H/O mitral valve repair        CURRENT MEDICATIONS:  Current Outpatient Medications   Medication Sig Dispense Refill     amoxicillin (AMOXIL) 500 MG capsule Take 500 mg by mouth Prior to dentist 2000mg       apixaban ANTICOAGULANT (ELIQUIS) 5 MG tablet Take by mouth 2 times daily        Ascorbic Acid (VITAMIN C) 500 MG CAPS Take 500 mg by mouth daily 500 mg in Am and 1,000 mg in PM       Cholecalciferol (VITAMIN D3) 1000 units CAPS Take 1,000 mg by mouth daily       Flaxseed, Linseed, (FLAXSEED OIL) 1200 MG CAPS Take 2,400 mg by mouth daily       flecainide (TAMBOCOR) 100 MG tablet Take 100 mg by mouth 2 times daily        ketoconazole (NIZORAL) 2 % cream Apply topically daily as needed for itching       levothyroxine (LEVOXYL) 100 MCG tablet Take 100 mcg by mouth daily       lisinopril (PRINIVIL/ZESTRIL) 5 MG tablet Take 1 tablet (5 mg) by mouth 2 times daily 60 tablet 3     magnesium 200 MG TABS Take 250 mg by mouth daily       melatonin 3 MG tablet Take 5 mg by mouth nightly as needed for sleep        Misc Natural Products (GLUCOSAMINE CHOND MSM FORMULA PO) Take by mouth daily       Multiple Vitamins-Minerals (VITRUM 50+ SENIOR  MULTI) TABS Take by mouth daily       Potassium Gluconate 595 MG CAPS Take 595 mg by mouth daily       RaNITidine HCl (RANITIDINE 150 MAX STRENGTH PO) Take 150 mg by mouth daily       sildenafil (REVATIO) 20 MG tablet Take 3 tablets (20 mg) by mouth daily as needed for erectile dysfunction 90 tablet 3       ALLERGIES     Allergies   Allergen Reactions     Amlodipine Swelling     Noted at 5 mg       PAST MEDICAL HISTORY:  Past Medical History:   Diagnosis Date     Atrial flutter (H)      GERD (gastroesophageal reflux disease)      Hyperlipidemia      Hypertension      Obstructive sleep apnea      Paroxysmal atrial fibrillation (H)      Smoking history     quit 1973       PAST SURGICAL HISTORY:  Past Surgical History:   Procedure Laterality Date     CARDIOVERSION  08/07/2017     COLONOSCOPY  2015     at [facility] ([place], [place], [place])     HC KNEE SCOPE,MED/LAT MENISCUS REPAIR Left 2016     HERNIA REPAIR  1994     Left atrial appendage ligation  1996     MR PROSTATE  WO & W CONTRAST  2016, 2018     PAROTIDECTOMY  2004    partial parotidectomy     REPAIR VALVE MITRAL  1996     SKIN CANCER SCREENING      Skin cancer surgeries-- basal on ear 2008, melanoma on left sideburn 2011, carcinoma right arm 2013     THYROIDECTOMY  2008    partial thyroidectomy       FAMILY HISTORY:  Family History   Problem Relation Age of Onset     Coronary Artery Disease Early Onset Father        SOCIAL HISTORY:  Social History     Socioeconomic History     Marital status:      Spouse name: None     Number of children: None     Years of education: None     Highest education level: None   Social Needs     Financial resource strain: None     Food insecurity - worry: None     Food insecurity - inability: None     Transportation needs - medical: None     Transportation needs - non-medical: None   Occupational History     None   Tobacco Use     Smoking status: Never Smoker     Smokeless tobacco: Never Used   Substance and  "Sexual Activity     Alcohol use: Yes     Comment: occassional     Drug use: None     Sexual activity: None   Other Topics Concern     Parent/sibling w/ CABG, MI or angioplasty before 65F 55M? Not Asked   Social History Narrative     None       Review of Systems:  Skin:  Negative     Eyes:  Positive for glasses  ENT:  Negative    Respiratory:  Negative for shortness of breath;dyspnea on exertion;cough  Cardiovascular:  Negative for;palpitations;chest pain;fatigue;dizziness;edema Positive for;lightheadedness  Gastroenterology: Negative for melena;hematochezia  Genitourinary:  Positive for prostate problem  Musculoskeletal:  Positive for foot pain  Neurologic:  Negative    Psychiatric:  Negative    Heme/Lymph/Imm:  Negative    Endocrine:  Positive for thyroid disorder    Physical Exam:  Vitals: /65   Pulse 57   Ht 1.753 m (5' 9\")   Wt 89.4 kg (197 lb 1.6 oz)   BMI 29.11 kg/m      Constitutional:  cooperative, alert and oriented, well developed, well nourished, in no acute distress appears younger than stated age      Skin:  not assessed this visit        Head:  not assessed this visit        Eyes:  pupils equal and round;not assessed this visit        ENT:  not assessed this visit        Neck:  not assessed this visit        Chest:  normal breath sounds, clear to auscultation, normal A-P diameter, normal symmetry, normal respiratory excursion, no use of accessory muscles        Cardiac: regular rhythm;normal S1 and S2 bradycardic                Abdomen:  not assessed this visit        Vascular: pulses full and equal                                      Extremities and Back:  no deformities, clubbing, cyanosis, erythema observed;no edema        Neurological:  not assessed this visit          Recent Lab Results:  LIPID RESULTS:  No results found for: CHOL, HDL, LDL, TRIG, CHOLHDLRATIO    LIVER ENZYME RESULTS:  No results found for: AST, ALT    CBC RESULTS:  No results found for: WBC, RBC, HGB, HCT, MCV, MCH, " MCHC, RDW, PLT    BMP RESULTS:  Lab Results   Component Value Date     (L) 01/14/2019    POTASSIUM 4.2 01/14/2019    CHLORIDE 102 01/14/2019    CO2 29 01/14/2019    ANIONGAP 8.2 01/14/2019     (H) 01/14/2019    BUN 18 01/14/2019    CR 1.16 01/14/2019    GFRESTIMATED 62 01/14/2019    GFRESTBLACK 75 01/14/2019    CAMILO 8.9 01/14/2019

## 2019-01-14 NOTE — PATIENT INSTRUCTIONS
1. BP today looks much better on the lisinopril 5 mg twice daily.  OK to move all 10 mg to evening if you'd prefer, otherwise keep taking it twice daily    2. If dizziness/lightheadedness persists, try to check BP when it's happening (if safe). MyChart/call me if this happens again!   Take breaks while driving    3. My nurses are Tesha/Pat: [phone]    4. Blood work when you return in 5/2019 and see Dr. Toure 8/2019. CALL if issues prior!!    5. Alcohol - cut back to 2 glasses of wine or 2 oz venkat

## 2019-01-14 NOTE — LETTER
1/14/2019    Stacy San MD, MD  [address]  [facility] [address]    RE: Zachariah Ram       Dear Colleague,    I had the pleasure of seeing Zachariah Ram in the [facility].    HPI:   I had the pleasure of seeing Johnathon when he came for follow up of hypertension and recent medication changes.  He sees Dr. Toure for his history of:    1.  Hypertension with recent adjustments in his antihypertensive dosing    2.  Paroxysmal atrial fibrillation for which he underwent ligation of the left atrial appendage at the time of his mitral valve annuloplasty.  Unfortunately, RUBÉN done 2017 did show some communication between the left atrium and left atrial appendage, and he has been placed on anticoagulation.    3.  Atrial tachycardia after he reduced his flecainide from 100-50 mg twice daily and switched his atenolol to a calcium channel blocker.  He underwent RUBÉN guided cardioversion 8/2018 and was started on anticoagulation given his CHADSVASc of 2 (hypertension, age) once RUBÉN showed communication between the left atrium and left atrial appendage.  Subsequently, has been back on flecainide since 8/2017.  No recurrence of atrial arrhythmias noted    4. Mitral regurgitation secondary to severe mitral valve prolapse status post Alayna mitral valve annuloplasty 6/1996 at [facility].    I saw Johnathon today to review recent medication changes.  When I last saw him 1/4, he was complaining of lower extremity edema.  I suspected that was secondary to amlodipine and had him discontinue this.  A week later, I asked him to send me a My Chart message regarding his edema and blood pressures.  We discontinued metoprolol XL 12.5 mg daily at that time given bradycardia.  Dr. Toure had previously tried to do this, but the patient restarted it when his blood pressure started to climb.  Finally, I added lisinopril 5 mg twice daily and asked him to see me back today with a BMP prior to his trip to  [place].    Overall, he has felt well.  His edema is gone, and he states that he has more energy off of metoprolol.  He is tolerating lisinopril 5 mg twice daily without issues, though does note that he had some lightheadedness after standing after driving an hour and a half.  He did not fall or faint.  He did not check his blood pressure, and states that the symptoms resolved.  This is only happened at one time, and does not have been subsequent.    He does note a reddened area on the right lateral foot near the baby toe.  It looks a bit like a tailor's bunion, and he states that he has been wearing tighter/stiffer shoes while working out on the treadmill as he prepares for hiking season in [place].    BMP today on lisinopril 5 mg BID looked fine with mild hyponatremia 135, potassium 4.2, BUN 18 and creatinine 1.16.  In 12/2018, creatinine was 1.10 at [facility].     Transesophageal echocardiogram 8/2017 showed a normal ejection fraction at 60%.  No evidence of left atrial appendage thrombus, with left atrial appendage being previously ligated with a small residual communication between between the left atrium and left atrial appendage.  There was no left atrial appendage thrombus.  He is status post Alayna mitral valve annuloplasty with a mean gradient of 3-4 (heart rate 88 bpm) with mild mitral regurgitation.  EF was 60%.  At that time, he underwent DC cardioversion as well.    Assessment & Plan:    1.  Hypertension    His blood pressure looks much better on lisinopril 5 mg twice daily.  He did have one episode of lightheadedness after sitting for a prolonged period of time and driving, but has not had any issues with this since    Amlodipine has been added to his intolerance list given the side effect of lower extremity swelling at 5 mg daily    PLAN:    Continue lisinopril    Keep checking blood pressure, and contact us if they increase    BMP when he returns from [place]      2.  Atrial fibrillation and  history of atrial tachycardia    He was in sinus rhythm on exam, and his last EKG when I saw him last week confirms sinus rhythm.      He remains on flecainide with good results.  Despite the small risk of 1-1 atrial flutter, we discontinued his metoprolol with Dr. Toure's recommendation secondary to bradycardia with some fatigue    He is feeling better off of the metoprolol    PLAN:    Continue flecainide 100 mg twice daily    Limit alcohol    Continue anticoagulation with Eliquis    Dr. Toure will see him again 8/2019 as planned I have encouraged him to contact us in the interim can we be of assistance. We will repeat an echocardiogram at that time due to his history of mitral valve disease.        Nakita Gayle PA-C, MSPAS      Orders Placed This Encounter   Procedures     Basic metabolic panel     Echocardiogram Complete     No orders of the defined types were placed in this encounter.    There are no discontinued medications.      Encounter Diagnoses   Name Primary?     Benign essential hypertension Yes     H/O mitral valve repair        CURRENT MEDICATIONS:  Current Outpatient Medications   Medication Sig Dispense Refill     amoxicillin (AMOXIL) 500 MG capsule Take 500 mg by mouth Prior to dentist 2000mg       apixaban ANTICOAGULANT (ELIQUIS) 5 MG tablet Take by mouth 2 times daily        Ascorbic Acid (VITAMIN C) 500 MG CAPS Take 500 mg by mouth daily 500 mg in Am and 1,000 mg in PM       Cholecalciferol (VITAMIN D3) 1000 units CAPS Take 1,000 mg by mouth daily       Flaxseed, Linseed, (FLAXSEED OIL) 1200 MG CAPS Take 2,400 mg by mouth daily       flecainide (TAMBOCOR) 100 MG tablet Take 100 mg by mouth 2 times daily        ketoconazole (NIZORAL) 2 % cream Apply topically daily as needed for itching       levothyroxine (LEVOXYL) 100 MCG tablet Take 100 mcg by mouth daily       lisinopril (PRINIVIL/ZESTRIL) 5 MG tablet Take 1 tablet (5 mg) by mouth 2 times daily 60 tablet 3     magnesium 200 MG TABS Take 250 mg by  mouth daily       melatonin 3 MG tablet Take 5 mg by mouth nightly as needed for sleep        Misc Natural Products (GLUCOSAMINE CHOND MSM FORMULA PO) Take by mouth daily       Multiple Vitamins-Minerals (VITRUM 50+ SENIOR MULTI) TABS Take by mouth daily       Potassium Gluconate 595 MG CAPS Take 595 mg by mouth daily       RaNITidine HCl (RANITIDINE 150 MAX STRENGTH PO) Take 150 mg by mouth daily       sildenafil (REVATIO) 20 MG tablet Take 3 tablets (20 mg) by mouth daily as needed for erectile dysfunction 90 tablet 3       ALLERGIES     Allergies   Allergen Reactions     Amlodipine Swelling     Noted at 5 mg       PAST MEDICAL HISTORY:  Past Medical History:   Diagnosis Date     Atrial flutter (H)      GERD (gastroesophageal reflux disease)      Hyperlipidemia      Hypertension      Obstructive sleep apnea      Paroxysmal atrial fibrillation (H)      Smoking history     quit 1973       PAST SURGICAL HISTORY:  Past Surgical History:   Procedure Laterality Date     CARDIOVERSION  08/07/2017     COLONOSCOPY  2015     at [facility] ([facility], [place], [place])     HC KNEE SCOPE,MED/LAT MENISCUS REPAIR Left 2016     HERNIA REPAIR  1994     Left atrial appendage ligation  1996     MR PROSTATE  WO & W CONTRAST  2016, 2018     PAROTIDECTOMY  2004    partial parotidectomy     REPAIR VALVE MITRAL  1996     SKIN CANCER SCREENING      Skin cancer surgeries-- basal on ear 2008, melanoma on left sideburn 2011, carcinoma right arm 2013     THYROIDECTOMY  2008    partial thyroidectomy       FAMILY HISTORY:  Family History   Problem Relation Age of Onset     Coronary Artery Disease Early Onset Father        SOCIAL HISTORY:  Social History     Socioeconomic History     Marital status:      Spouse name: None     Number of children: None     Years of education: None     Highest education level: None   Social Needs     Financial resource strain: None     Food insecurity - worry: None     Food insecurity -  "inability: None     Transportation needs - medical: None     Transportation needs - non-medical: None   Occupational History     None   Tobacco Use     Smoking status: Never Smoker     Smokeless tobacco: Never Used   Substance and Sexual Activity     Alcohol use: Yes     Comment: occassional     Drug use: None     Sexual activity: None   Other Topics Concern     Parent/sibling w/ CABG, MI or angioplasty before 65F 55M? Not Asked   Social History Narrative     None       Review of Systems:  Skin:  Negative     Eyes:  Positive for glasses  ENT:  Negative    Respiratory:  Negative for shortness of breath;dyspnea on exertion;cough  Cardiovascular:  Negative for;palpitations;chest pain;fatigue;dizziness;edema Positive for;lightheadedness  Gastroenterology: Negative for melena;hematochezia  Genitourinary:  Positive for prostate problem  Musculoskeletal:  Positive for foot pain  Neurologic:  Negative    Psychiatric:  Negative    Heme/Lymph/Imm:  Negative    Endocrine:  Positive for thyroid disorder    Physical Exam:  Vitals: /65   Pulse 57   Ht 1.753 m (5' 9\")   Wt 89.4 kg (197 lb 1.6 oz)   BMI 29.11 kg/m       Constitutional:  cooperative, alert and oriented, well developed, well nourished, in no acute distress appears younger than stated age      Skin:  not assessed this visit        Head:  not assessed this visit        Eyes:  pupils equal and round;not assessed this visit        ENT:  not assessed this visit        Neck:  not assessed this visit        Chest:  normal breath sounds, clear to auscultation, normal A-P diameter, normal symmetry, normal respiratory excursion, no use of accessory muscles        Cardiac: regular rhythm;normal S1 and S2 bradycardic                Abdomen:  not assessed this visit        Vascular: pulses full and equal                                      Extremities and Back:  no deformities, clubbing, cyanosis, erythema observed;no edema        Neurological:  not assessed this visit "          Recent Lab Results:  LIPID RESULTS:  No results found for: CHOL, HDL, LDL, TRIG, CHOLHDLRATIO    LIVER ENZYME RESULTS:  No results found for: AST, ALT    CBC RESULTS:  No results found for: WBC, RBC, HGB, HCT, MCV, MCH, MCHC, RDW, PLT    BMP RESULTS:  Lab Results   Component Value Date     (L) 01/14/2019    POTASSIUM 4.2 01/14/2019    CHLORIDE 102 01/14/2019    CO2 29 01/14/2019    ANIONGAP 8.2 01/14/2019     (H) 01/14/2019    BUN 18 01/14/2019    CR 1.16 01/14/2019    GFRESTIMATED 62 01/14/2019    GFRESTBLACK 75 01/14/2019    CAMILO 8.9 01/14/2019        Thank you for allowing me to participate in the care of your patient.    Sincerely,     Sera Gayle PA-C     [facility]

## 2019-01-18 ENCOUNTER — MYC MEDICAL ADVICE (OUTPATIENT)
Dept: CARDIOLOGY | Facility: CLINIC | Age: 74
End: 2019-01-18

## 2019-01-25 DIAGNOSIS — I10 BENIGN ESSENTIAL HYPERTENSION: ICD-10-CM

## 2019-01-25 RX ORDER — LISINOPRIL 5 MG/1
5 TABLET ORAL 2 TIMES DAILY
Qty: 180 TABLET | Refills: 2 | Status: SHIPPED | OUTPATIENT
Start: 2019-01-25 | End: 2020-10-09

## 2019-06-03 DIAGNOSIS — N52.9 VASCULOGENIC ERECTILE DYSFUNCTION, UNSPECIFIED VASCULOGENIC ERECTILE DYSFUNCTION TYPE: ICD-10-CM

## 2019-06-05 ENCOUNTER — TELEPHONE (OUTPATIENT)
Dept: CARDIOLOGY | Facility: CLINIC | Age: 74
End: 2019-06-05

## 2019-06-05 DIAGNOSIS — N52.9 VASCULOGENIC ERECTILE DYSFUNCTION, UNSPECIFIED VASCULOGENIC ERECTILE DYSFUNCTION TYPE: ICD-10-CM

## 2019-06-05 RX ORDER — SILDENAFIL CITRATE 20 MG/1
TABLET ORAL
Qty: 270 TABLET | Refills: 0 | OUTPATIENT
Start: 2019-06-05

## 2019-07-08 ENCOUNTER — TELEPHONE (OUTPATIENT)
Dept: CARDIOLOGY | Facility: CLINIC | Age: 74
End: 2019-07-08

## 2019-07-08 NOTE — TELEPHONE ENCOUNTER
Krishna ADAN stating that he wanted the BMP orders faxed to his PCP at [facility] at [phone].  Order faxed. Nataliya

## 2019-07-31 ENCOUNTER — OFFICE VISIT (OUTPATIENT)
Dept: CARDIOLOGY | Facility: CLINIC | Age: 74
End: 2019-07-31
Attending: INTERNAL MEDICINE
Payer: MEDICARE

## 2019-07-31 ENCOUNTER — HOSPITAL ENCOUNTER (OUTPATIENT)
Dept: CARDIOLOGY | Facility: CLINIC | Age: 74
Discharge: HOME | End: 2019-07-31
Attending: PHYSICIAN ASSISTANT | Admitting: PHYSICIAN ASSISTANT
Payer: MEDICARE

## 2019-07-31 VITALS
WEIGHT: 189.1 LBS | DIASTOLIC BLOOD PRESSURE: 70 MMHG | BODY MASS INDEX: 28.01 KG/M2 | HEIGHT: 69 IN | SYSTOLIC BLOOD PRESSURE: 118 MMHG | HEART RATE: 60 BPM

## 2019-07-31 DIAGNOSIS — Z98.890 H/O MITRAL VALVE REPAIR: ICD-10-CM

## 2019-07-31 DIAGNOSIS — I48.0 PAROXYSMAL ATRIAL FIBRILLATION (H): ICD-10-CM

## 2019-07-31 PROCEDURE — 93306 TTE W/DOPPLER COMPLETE: CPT | Mod: 26 | Performed by: INTERNAL MEDICINE

## 2019-07-31 PROCEDURE — 93000 ELECTROCARDIOGRAM COMPLETE: CPT | Performed by: INTERNAL MEDICINE

## 2019-07-31 PROCEDURE — 93306 TTE W/DOPPLER COMPLETE: CPT

## 2019-07-31 PROCEDURE — 99214 OFFICE O/P EST MOD 30 MIN: CPT | Mod: 25 | Performed by: INTERNAL MEDICINE

## 2019-07-31 RX ORDER — ALFUZOSIN HYDROCHLORIDE 10 MG/1
10 TABLET, EXTENDED RELEASE ORAL
COMMUNITY
Start: 2019-06-14 | End: 2020-06-13

## 2019-07-31 ASSESSMENT — MIFFLIN-ST. JEOR: SCORE: 1588.13

## 2019-07-31 NOTE — PROGRESS NOTES
Service Date: 07/31/2019      HISTORY OF PRESENT ILLNESS:  Thank you for allowing me to participate in the care of this very delightful patient.  As you know, Johnathon is a 74-year-old gentleman with a history of severe MR due to mitral valve prolapse, status post repair along with ligation of left atrial appendage in 1996 in light of his known paroxysmal atrial fibrillation at that time.  I last saw the patient a year ago.      Johnathon has been on flecainide for his atrial tachyarrhythmias including atrial tachycardia with 2:1 AV conduction with excellent results.  He cannot recall the last time he had it.  Additionally, he is known to have PVCs as well and the flecainide seemed to be helpful with that.  Even though his left atrial appendage was ligated from the previous surgery, a RUBÉN did demonstrate a small residual communication between the left atrium and left atrial appendage, and in light of that, I have him on Eliquis because of his CHADS-VASc score of 2, almost 3 now as he is almost 75.      He brought with him a list of questions for which I tried to answer all of it to his satisfaction.  He does have history of elevated PSA level, with the latest one being 9.  His electrolytes and CBC are within normal range along with his hepatic profile.  He mentioned that he has some dry cough from taking lisinopril, but his blood pressure is pretty well controlled.  I did tell him that lisinopril can cause a dry cough, but as long as he can tolerate it, he can continue it.  Otherwise, we can switch him to an ARB or something else.  His pedal edema seems to be less after stop the amlodipine, but he still has some residual that worsens as the day progresses.  I told him that it would be okay and it is not a sign of heart failure.  The patient has been on Viagra for his erectile dysfunction, and we chatted a little bit about other options such as over-the-counter drugs for which I told him that I have no idea about its  effectiveness or side effects.      The patient otherwise has been quite healthy and active.  He continues to do daily walks and hiking when he is in [place].  EKG today demonstrates sinus rhythm with a normal QRS width.  The rate is about 55 beats per minute, which is his baseline.  I will have the patient come back to see Nakita, one of our advanced practice providers, a year from now and see me every other year.      cc:   Stacy San MD    [facility] [address]    [place]  [address]         KIRBY GANNON MD             D: 2019   T: 2019   MT: VIELKA      Name:     LA NENA GIBSON   MRN:      [id number]        Account:      [id number]   :      1945           Service Date: 2019      Document: [id number]

## 2019-07-31 NOTE — LETTER
2019    Stacy San MD, MD  [facility] [address]  [facility] [address]    RE: Zachariah Ram       Dear Colleague,    I had the pleasure of seeing Zachariah Ram in the [facility].    HPI and Plan:   See dictation  [id number]  Orders Placed This Encounter   Procedures     Follow-Up with Cardiac Advanced Practice Provider     EKG 12-lead complete w/read - Clinics (performed today)       Orders Placed This Encounter   Medications     alfuzosin ER (UROXATRAL) 10 MG 24 hr tablet     Sig: Take 10 mg by mouth     calcium citrate-vitamin D (CITRACAL) 315-200 MG-UNIT TABS per tablet     AZELASTINE HCL NA     Si.5 mcg        There are no discontinued medications.      Encounter Diagnosis   Name Primary?     Paroxysmal atrial fibrillation (H)        CURRENT MEDICATIONS:  Current Outpatient Medications   Medication Sig Dispense Refill     alfuzosin ER (UROXATRAL) 10 MG 24 hr tablet Take 10 mg by mouth       amoxicillin (AMOXIL) 500 MG capsule Take 500 mg by mouth Prior to dentist 2000mg       apixaban ANTICOAGULANT (ELIQUIS) 5 MG tablet Take by mouth 2 times daily        Ascorbic Acid (VITAMIN C) 500 MG CAPS Take 500 mg by mouth daily 500 mg in Am and 1,000 mg in PM       AZELASTINE HCL .5 mcg        calcium citrate-vitamin D (CITRACAL) 315-200 MG-UNIT TABS per tablet        Cholecalciferol (VITAMIN D3) 1000 units CAPS Take 1,000 mg by mouth daily       Flaxseed, Linseed, (FLAXSEED OIL) 1200 MG CAPS Take 2,400 mg by mouth daily       flecainide (TAMBOCOR) 100 MG tablet Take 100 mg by mouth 2 times daily        ketoconazole (NIZORAL) 2 % cream Apply topically daily as needed for itching       levothyroxine (LEVOXYL) 100 MCG tablet Take 100 mcg by mouth daily       lisinopril (PRINIVIL/ZESTRIL) 5 MG tablet Take 1 tablet (5 mg) by mouth 2 times daily 180 tablet 2     magnesium 200 MG TABS Take 250 mg by mouth daily       melatonin 3 MG tablet Take 5 mg by mouth nightly as  needed for sleep        Misc Natural Products (GLUCOSAMINE CHOND MSM FORMULA PO) Take by mouth daily       Multiple Vitamins-Minerals (VITRUM 50+ SENIOR MULTI) TABS Take by mouth daily       Potassium Gluconate 595 MG CAPS Take 595 mg by mouth daily       RaNITidine HCl (RANITIDINE 150 MAX STRENGTH PO) Take 150 mg by mouth daily       sildenafil (REVATIO) 20 MG tablet Take 3 tablets (20 mg) by mouth daily as needed for erectile dysfunction 90 tablet 3       ALLERGIES     Allergies   Allergen Reactions     Amlodipine Swelling     Noted at 5 mg       PAST MEDICAL HISTORY:  Past Medical History:   Diagnosis Date     Atrial flutter (H)      GERD (gastroesophageal reflux disease)      Hyperlipidemia      Hypertension      Obstructive sleep apnea      Paroxysmal atrial fibrillation (H)      Smoking history     quit 1973       PAST SURGICAL HISTORY:  Past Surgical History:   Procedure Laterality Date     CARDIOVERSION  08/07/2017     COLONOSCOPY  2015     at [facility] ([facility], [place], [place])     HC KNEE SCOPE,MED/LAT MENISCUS REPAIR Left 2016     HERNIA REPAIR  1994     Left atrial appendage ligation  1996     MR PROSTATE  WO & W CONTRAST  2016, 2018     PAROTIDECTOMY  2004    partial parotidectomy     REPAIR VALVE MITRAL  1996     SKIN CANCER SCREENING      Skin cancer surgeries-- basal on ear 2008, melanoma on left sideburn 2011, carcinoma right arm 2013     THYROIDECTOMY  2008    partial thyroidectomy       FAMILY HISTORY:  Family History   Problem Relation Age of Onset     Coronary Artery Disease Early Onset Father        SOCIAL HISTORY:  Social History     Socioeconomic History     Marital status:      Spouse name: None     Number of children: None     Years of education: None     Highest education level: None   Occupational History     None   Social Needs     Financial resource strain: None     Food insecurity:     Worry: None     Inability: None     Transportation needs:     Medical: None  "    Non-medical: None   Tobacco Use     Smoking status: Never Smoker     Smokeless tobacco: Never Used   Substance and Sexual Activity     Alcohol use: Yes     Comment: occassional     Drug use: None     Sexual activity: None   Lifestyle     Physical activity:     Days per week: None     Minutes per session: None     Stress: None   Relationships     Social connections:     Talks on phone: None     Gets together: None     Attends Druze service: None     Active member of club or organization: None     Attends meetings of clubs or organizations: None     Relationship status: None     Intimate partner violence:     Fear of current or ex partner: None     Emotionally abused: None     Physically abused: None     Forced sexual activity: None   Other Topics Concern     Parent/sibling w/ CABG, MI or angioplasty before 65F 55M? Not Asked   Social History Narrative     None       Review of Systems:  Skin:  Negative       Eyes:  Positive for glasses    ENT:  Negative      Respiratory:  Positive for cough dry cough   Cardiovascular:    Positive for;edema    Gastroenterology: Negative      Genitourinary:  Positive for prostate problem    Musculoskeletal:  Negative      Neurologic:  Negative      Psychiatric:  Negative      Heme/Lymph/Imm:  Positive for allergies    Endocrine:  Positive for thyroid disorder      Physical Exam:  Vitals: /70   Pulse 60   Ht 1.753 m (5' 9\")   Wt 85.8 kg (189 lb 1.6 oz)   BMI 27.93 kg/m       Constitutional:  cooperative, alert and oriented, well developed, well nourished, in no acute distress        Skin:  warm and dry to the touch, no apparent skin lesions or masses noted          Head:  normocephalic, no masses or lesions        Eyes:  pupils equal and round, conjunctivae and lids unremarkable, sclera white, no xanthalasma, EOMS intact, no nystagmus        Lymph:      ENT:           Neck:  carotid pulses are full and equal bilaterally, JVP normal, no carotid bruit        Respiratory:  " normal breath sounds, clear to auscultation, normal A-P diameter, normal symmetry, normal respiratory excursion, no use of accessory muscles         Cardiac: regular rhythm, normal S1/S2, no S3 or S4, apical impulse not displaced, no murmurs, gallops or rubs                pulses full and equal, no bruits auscultated                                        GI:           Extremities and Muscular Skeletal:  no deformities, clubbing, cyanosis, erythema observed              Neurological:  no gross motor deficits        Psych:  Alert and Oriented x 3        CC  Juan Cope MD  [address]  [place] [id number]                Thank you for allowing me to participate in the care of your patient.      Sincerely,     Juan Cope MD     [facility]    cc:   Juan Cope MD  [address]  [place] [id number]

## 2019-07-31 NOTE — LETTER
7/31/2019      Stacy San MD, MD  [facility] [address]  [facility] [address]      RE: Zachariah Ram       Dear Colleague,    I had the pleasure of seeing Zachariah Ram in the [facility].    Service Date: 07/31/2019      HISTORY OF PRESENT ILLNESS:  Thank you for allowing me to participate in the care of this very delightful patient.  As you know, Johnathon is a 74-year-old gentleman with a history of severe MR due to mitral valve prolapse, status post repair along with ligation of left atrial appendage in 1996 in light of his known paroxysmal atrial fibrillation at that time.  I last saw the patient a year ago.      Johnathon has been on flecainide for his atrial tachyarrhythmias including atrial tachycardia with 2:1 AV conduction with excellent results.  He cannot recall the last time he had it.  Additionally, he is known to have PVCs as well and the flecainide seemed to be helpful with that.  Even though his left atrial appendage was ligated from the previous surgery, a RUBÉN did demonstrate a small residual communication between the left atrium and left atrial appendage, and in light of that, I have him on Eliquis because of his CHADS-VASc score of 2, almost 3 now as he is almost 75.      He brought with him a list of questions for which I tried to answer all of it to his satisfaction.  He does have history of elevated PSA level, with the latest one being 9.  His electrolytes and CBC are within normal range along with his hepatic profile.  He mentioned that he has some dry cough from taking lisinopril, but his blood pressure is pretty well controlled.  I did tell him that lisinopril can cause a dry cough, but as long as he can tolerate it, he can continue it.  Otherwise, we can switch him to an ARB or something else.  His pedal edema seems to be less after stop the amlodipine, but he still has some residual that worsens as the day progresses.  I told him that it would be okay and  it is not a sign of heart failure.  The patient has been on Viagra for his erectile dysfunction, and we chatted a little bit about other options such as over-the-counter drugs for which I told him that I have no idea about its effectiveness or side effects.      The patient otherwise has been quite healthy and active.  He continues to do daily walks and hiking when he is in [place].  EKG today demonstrates sinus rhythm with a normal QRS width.  The rate is about 55 beats per minute, which is his baseline.  I will have the patient come back to see Nakita, one of our advanced practice providers, a year from now and see me every other year.      cc:   Stacy San MD    [facility]    [address]    [place]  [address]         KIRBY GANNON MD             D: 2019   T: 2019   MT: VIELKA      Name:     LA NENA GIBSON   MRN:      [id number]        Account:      [id number]   :      1945           Service Date: 2019      Document: [id number]           Outpatient Encounter Medications as of 2019   Medication Sig Dispense Refill     alfuzosin ER (UROXATRAL) 10 MG 24 hr tablet Take 10 mg by mouth       amoxicillin (AMOXIL) 500 MG capsule Take 500 mg by mouth Prior to dentist 2000mg       apixaban ANTICOAGULANT (ELIQUIS) 5 MG tablet Take by mouth 2 times daily        Ascorbic Acid (VITAMIN C) 500 MG CAPS Take 500 mg by mouth daily 500 mg in Am and 1,000 mg in PM       AZELASTINE HCL .5 mcg        calcium citrate-vitamin D (CITRACAL) 315-200 MG-UNIT TABS per tablet        Cholecalciferol (VITAMIN D3) 1000 units CAPS Take 1,000 mg by mouth daily       Flaxseed, Linseed, (FLAXSEED OIL) 1200 MG CAPS Take 2,400 mg by mouth daily       flecainide (TAMBOCOR) 100 MG tablet Take 100 mg by mouth 2 times daily        ketoconazole (NIZORAL) 2 % cream Apply topically daily as needed for itching       levothyroxine (LEVOXYL) 100 MCG tablet Take 100 mcg by mouth daily        lisinopril (PRINIVIL/ZESTRIL) 5 MG tablet Take 1 tablet (5 mg) by mouth 2 times daily 180 tablet 2     magnesium 200 MG TABS Take 250 mg by mouth daily       melatonin 3 MG tablet Take 5 mg by mouth nightly as needed for sleep        Misc Natural Products (GLUCOSAMINE CHOND MSM FORMULA PO) Take by mouth daily       Multiple Vitamins-Minerals (VITRUM 50+ SENIOR MULTI) TABS Take by mouth daily       Potassium Gluconate 595 MG CAPS Take 595 mg by mouth daily       RaNITidine HCl (RANITIDINE 150 MAX STRENGTH PO) Take 150 mg by mouth daily       sildenafil (REVATIO) 20 MG tablet Take 3 tablets (20 mg) by mouth daily as needed for erectile dysfunction 90 tablet 3     No facility-administered encounter medications on file as of 7/31/2019.        Again, thank you for allowing me to participate in the care of your patient.      Sincerely,    Juan Cope MD     [facility]

## 2019-07-31 NOTE — PROGRESS NOTES
HPI and Plan:   See dictation  [id number]  Orders Placed This Encounter   Procedures     Follow-Up with Cardiac Advanced Practice Provider     EKG 12-lead complete w/read - Clinics (performed today)       Orders Placed This Encounter   Medications     alfuzosin ER (UROXATRAL) 10 MG 24 hr tablet     Sig: Take 10 mg by mouth     calcium citrate-vitamin D (CITRACAL) 315-200 MG-UNIT TABS per tablet     AZELASTINE HCL NA     Si.5 mcg        There are no discontinued medications.      Encounter Diagnosis   Name Primary?     Paroxysmal atrial fibrillation (H)        CURRENT MEDICATIONS:  Current Outpatient Medications   Medication Sig Dispense Refill     alfuzosin ER (UROXATRAL) 10 MG 24 hr tablet Take 10 mg by mouth       amoxicillin (AMOXIL) 500 MG capsule Take 500 mg by mouth Prior to dentist 2000mg       apixaban ANTICOAGULANT (ELIQUIS) 5 MG tablet Take by mouth 2 times daily        Ascorbic Acid (VITAMIN C) 500 MG CAPS Take 500 mg by mouth daily 500 mg in Am and 1,000 mg in PM       AZELASTINE HCL .5 mcg        calcium citrate-vitamin D (CITRACAL) 315-200 MG-UNIT TABS per tablet        Cholecalciferol (VITAMIN D3) 1000 units CAPS Take 1,000 mg by mouth daily       Flaxseed, Linseed, (FLAXSEED OIL) 1200 MG CAPS Take 2,400 mg by mouth daily       flecainide (TAMBOCOR) 100 MG tablet Take 100 mg by mouth 2 times daily        ketoconazole (NIZORAL) 2 % cream Apply topically daily as needed for itching       levothyroxine (LEVOXYL) 100 MCG tablet Take 100 mcg by mouth daily       lisinopril (PRINIVIL/ZESTRIL) 5 MG tablet Take 1 tablet (5 mg) by mouth 2 times daily 180 tablet 2     magnesium 200 MG TABS Take 250 mg by mouth daily       melatonin 3 MG tablet Take 5 mg by mouth nightly as needed for sleep        Misc Natural Products (GLUCOSAMINE CHOND MSM FORMULA PO) Take by mouth daily       Multiple Vitamins-Minerals (VITRUM 50+ SENIOR MULTI) TABS Take by mouth daily       Potassium Gluconate 595 MG CAPS Take 595 mg  by mouth daily       RaNITidine HCl (RANITIDINE 150 MAX STRENGTH PO) Take 150 mg by mouth daily       sildenafil (REVATIO) 20 MG tablet Take 3 tablets (20 mg) by mouth daily as needed for erectile dysfunction 90 tablet 3       ALLERGIES     Allergies   Allergen Reactions     Amlodipine Swelling     Noted at 5 mg       PAST MEDICAL HISTORY:  Past Medical History:   Diagnosis Date     Atrial flutter (H)      GERD (gastroesophageal reflux disease)      Hyperlipidemia      Hypertension      Obstructive sleep apnea      Paroxysmal atrial fibrillation (H)      Smoking history     quit 1973       PAST SURGICAL HISTORY:  Past Surgical History:   Procedure Laterality Date     CARDIOVERSION  08/07/2017     COLONOSCOPY  2015     at [facility] ([place]     HC KNEE SCOPE,MED/LAT MENISCUS REPAIR Left 2016     HERNIA REPAIR  1994     Left atrial appendage ligation  1996     MR PROSTATE  WO & W CONTRAST  2016, 2018     PAROTIDECTOMY  2004    partial parotidectomy     REPAIR VALVE MITRAL  1996     SKIN CANCER SCREENING      Skin cancer surgeries-- basal on ear 2008, melanoma on left sideburn 2011, carcinoma right arm 2013     THYROIDECTOMY  2008    partial thyroidectomy       FAMILY HISTORY:  Family History   Problem Relation Age of Onset     Coronary Artery Disease Early Onset Father        SOCIAL HISTORY:  Social History     Socioeconomic History     Marital status:      Spouse name: None     Number of children: None     Years of education: None     Highest education level: None   Occupational History     None   Social Needs     Financial resource strain: None     Food insecurity:     Worry: None     Inability: None     Transportation needs:     Medical: None     Non-medical: None   Tobacco Use     Smoking status: Never Smoker     Smokeless tobacco: Never Used   Substance and Sexual Activity     Alcohol use: Yes     Comment: occassional     Drug use: None     Sexual activity: None  "  Lifestyle     Physical activity:     Days per week: None     Minutes per session: None     Stress: None   Relationships     Social connections:     Talks on phone: None     Gets together: None     Attends Jain service: None     Active member of club or organization: None     Attends meetings of clubs or organizations: None     Relationship status: None     Intimate partner violence:     Fear of current or ex partner: None     Emotionally abused: None     Physically abused: None     Forced sexual activity: None   Other Topics Concern     Parent/sibling w/ CABG, MI or angioplasty before 65F 55M? Not Asked   Social History Narrative     None       Review of Systems:  Skin:  Negative       Eyes:  Positive for glasses    ENT:  Negative      Respiratory:  Positive for cough dry cough   Cardiovascular:    Positive for;edema    Gastroenterology: Negative      Genitourinary:  Positive for prostate problem    Musculoskeletal:  Negative      Neurologic:  Negative      Psychiatric:  Negative      Heme/Lymph/Imm:  Positive for allergies    Endocrine:  Positive for thyroid disorder      Physical Exam:  Vitals: /70   Pulse 60   Ht 1.753 m (5' 9\")   Wt 85.8 kg (189 lb 1.6 oz)   BMI 27.93 kg/m      Constitutional:  cooperative, alert and oriented, well developed, well nourished, in no acute distress        Skin:  warm and dry to the touch, no apparent skin lesions or masses noted          Head:  normocephalic, no masses or lesions        Eyes:  pupils equal and round, conjunctivae and lids unremarkable, sclera white, no xanthalasma, EOMS intact, no nystagmus        Lymph:      ENT:           Neck:  carotid pulses are full and equal bilaterally, JVP normal, no carotid bruit        Respiratory:  normal breath sounds, clear to auscultation, normal A-P diameter, normal symmetry, normal respiratory excursion, no use of accessory muscles         Cardiac: regular rhythm, normal S1/S2, no S3 or S4, apical impulse not " displaced, no murmurs, gallops or rubs                pulses full and equal, no bruits auscultated                                        GI:           Extremities and Muscular Skeletal:  no deformities, clubbing, cyanosis, erythema observed              Neurological:  no gross motor deficits        Psych:  Alert and Oriented x 3        CC  Juan Isaac Toure MD  [id number]  [place] [id number]

## 2019-09-29 NOTE — TELEPHONE ENCOUNTER
I called patient regarding a fax request for his Sildenafil from [place] pharmacy. Patient said he is back in [place] and does not want this filled. I told him I would call the pharmacy and inofrm them.    none

## 2019-10-03 ENCOUNTER — HEALTH MAINTENANCE LETTER (OUTPATIENT)
Age: 74
End: 2019-10-03

## 2019-12-16 ENCOUNTER — HEALTH MAINTENANCE LETTER (OUTPATIENT)
Age: 74
End: 2019-12-16

## 2020-02-23 NOTE — TELEPHONE ENCOUNTER
Understanding Sinus Problems    You dont often think about your sinuses until theres a problem. One day you realize you cant smell dinner cooking. Or you find you often have headaches or problems breathing through your nose.  Symptoms of sinus problems  Sinus problems can cause uncomfortable symptoms. Your nose may run constantly. You might have trouble sleeping at night. You may even lose your sense of smell. Other symptoms can include:  · Nasal congestion  · Fullness in ears  · Green, yellow, or bloody drainage from the nose  · Trouble tasting food  · Frequent headaches  · Facial pain  · Cough  When sinuses are blocked  If something blocks the passages in the nose or sinuses, mucus cant drain. Mucus-filled sinuses often become infected.  · Colds cause the lining of the nose and sinuses to swell and make extra mucus. A buildup of mucus can lead to a more serious infection.  · Allergies irritate turbinates and other tissues. This causes swelling, which can cause a blockage. Over time, this irritation can also lead to sacs of swollen tissue (polyps).  · Polyps may form in both the sinuses and nose. Polyps can grow large enough to clog nasal passages and block drainage.  · A crooked (deviated) septum may block nasal passages. This is often the result of an injury.  Date Last Reviewed: 11/1/2016  © 2548-5521 [employer]. [address] [place] [address], [place] [address]. All rights reserved. This information is not intended as a substitute for professional medical care. Always follow your healthcare professional's instructions.         Patient called reporting he has been having JAMILA for approximately 3 months.  Started after his amlodipine was increased to 5mg po bid on 9/24.  This was increased when patient noted his home BP readings were elevated because patient was taken off his metoprolol XL 12.5mg po every day d/t bradycardia and that was keeping his BP in check.   Patient reports he has an appt with MICHAELA Ordonez on 1/4 to have evaluated however wanted to see if there was anything he should do in the mean time prior to appt.  Patient self reported he has changed up his medication regimen to see if the swelling would go away.  For the last 4 days patient decreased his amlodipine to 5mg once daily, added back his metoprolol XL 12.5mg po every day and stopped taking his tamsulosin HCL. Patient reports he is keeping track of his BP and HR with this change and have not noticed any issues yet with low HR>   Informed patient that I would update Dr Toure regarding above to see if he wants any changes made prior to his appt on 1/4.  KAIN Torres

## 2020-03-24 ENCOUNTER — TELEPHONE (OUTPATIENT)
Dept: CARDIOLOGY | Facility: CLINIC | Age: 75
End: 2020-03-24

## 2020-03-24 DIAGNOSIS — Z91.89 AT RISK FOR LONG QT SYNDROME: Primary | ICD-10-CM

## 2020-03-24 NOTE — TELEPHONE ENCOUNTER
03/24/20 Pt LM on Afib line stating he was recently diagnosed w prostate cancer and has a few questions re: new medications he is starting on. Attempted to call pt back, reached VM: awaiting callback. Nadir 117 pm

## 2020-03-25 NOTE — TELEPHONE ENCOUNTER
Pt called and states that he was to have had a proctectomy on 4/9, but it was cancelled due COVID-19.  Pt is to start Lupron, which can cause QT prolongation and pt has been told to discuss this with Dr Toure.  Pt at current is taking Bicalutamide he starts the Lupron on 4/7. If Dr Toure does not recommend that Lupron, pt would stay on the Bicalutamide until surgery.  Pt states that he is doing very well on the Flecainide and is climbing in [place] with no issues.  Told pt that would message Dr Toure as to his recommendation.  Did explain that what may happen is an EKG can be down after medication is started and check periodically after.  Will message Dr Toure to review. Nataliya

## 2020-03-26 NOTE — TELEPHONE ENCOUNTER
In theory flecainide wouldn't cause QT prolongation but per [employer] it potentially prolong QT when taking Lupron. It sounds like Flecainide has been quite effective for him and I can't think of any AAD that wouldn't prolong QT when taking Lupron. If there is no other choice except Lupron he has to accept a potential risk of QT prolongation. In case like this normally I would recommend ECG in 2 and 5 days after taking lupron provided he understands and accepts the risk I mentioned. Thanks, qp

## 2020-03-26 NOTE — TELEPHONE ENCOUNTER
Made pt aware of Dr Toure recommendations once pt starts the Lupron.  Pt who will start on 4/7, will thus come in on 4/9 and 4/13 for EKG's to be reviewed by Dr Toure.  Will place orders for both and then have scheduling to call pt to schedule. Pt preferred to come to heart clinic for EKG's. Nataliya

## 2020-04-06 ENCOUNTER — MYC MEDICAL ADVICE (OUTPATIENT)
Dept: CARDIOLOGY | Facility: CLINIC | Age: 75
End: 2020-04-06

## 2020-04-06 NOTE — TELEPHONE ENCOUNTER
04/06/20 Called pt to schedule EKG on 4/9 and 4/13 per Dr Toure after Lupron injection on 4/7. Wellness screening completed:  Wellness Screening Tool    Symptom Screening:    Do you have one of the following new symptoms:      Fever or reported chills? No    A new cough (started within the past 14 days) No    Shortness of breath (started within the past 14 days)No    Nausea, vomiting or diarrhea No    Within the past 3 weeks, have you been exposed to someone with a known positive illness below?      COVID - 19 (known or suspected)  no    Chicken pox? no    Measles? no    Pertussis? No        Patient notified of visitor restriction: Yes     Patient's appointment status: Patient will be seen in clinic as scheduled on 4/9 and 4/13 for EKG.     Nadir 212 pm

## 2020-04-08 ENCOUNTER — TELEPHONE (OUTPATIENT)
Dept: CARDIOLOGY | Facility: CLINIC | Age: 75
End: 2020-04-08

## 2020-04-08 NOTE — TELEPHONE ENCOUNTER
Wellness Screening Tool    Symptom Screening:    Do you have one of the following new symptoms:      Fever or reported chills? No    A new cough (started within the past 14 days)? No    Shortness of breath (started within the past 14 days)?  No    Nausea, vomiting or diarrhea?  No    Within the past 3 weeks, have you been exposed to someone with a known positive illness below?      COVID - 19 (known or suspected) no    Chicken pox? no    Measles? no    Pertussis?No        Patient notified of visitor restriction: Yes     Patient's appointment status: Patient will be seen in clinic as scheduled on 4/9.

## 2020-04-09 DIAGNOSIS — Z91.89 AT RISK FOR LONG QT SYNDROME: ICD-10-CM

## 2020-04-09 PROCEDURE — 93000 ELECTROCARDIOGRAM COMPLETE: CPT | Performed by: INTERNAL MEDICINE

## 2020-04-09 NOTE — TELEPHONE ENCOUNTER
Spoke with patient regarding recommendations per Dr Toure to continue with lupron.  Scheduled for second EKG on 4/13. Patient provided verbal understanding.  KAIN Torres

## 2020-04-13 ENCOUNTER — TELEPHONE (OUTPATIENT)
Dept: CARDIOLOGY | Facility: CLINIC | Age: 75
End: 2020-04-13

## 2020-04-13 DIAGNOSIS — Z91.89 AT RISK FOR LONG QT SYNDROME: ICD-10-CM

## 2020-04-13 PROCEDURE — 93000 ELECTROCARDIOGRAM COMPLETE: CPT | Performed by: INTERNAL MEDICINE

## 2020-04-13 NOTE — TELEPHONE ENCOUNTER
Called patient and updated him on EKG results.     ----- Message from Juan Cope MD sent at 4/13/2020 10:05 AM CDT -----  No changes. Continue with current meds. thanks

## 2020-09-24 ENCOUNTER — COMMUNICATION - RIVER FALLS (OUTPATIENT)
Dept: FAMILY MEDICINE | Facility: CLINIC | Age: 75
End: 2020-09-24

## 2020-10-06 NOTE — PROGRESS NOTES
"HPI:   I had the pleasure of seeing Zachariah when he came for follow up of HTN.  This 75 year old sees Dr. Toure for his history of:    1. HTN with recent adjustments in his antihypertensive dosing  2. Paroxysmal AFib s/p ligation of the BENTON at the time of his mitral valve annuloplasty. Unfortunately, RUBÉN done 2017 did show some communication between the left atrium and left atrial appendage, and he has been placed on anticoagulation with ELiquis   3. AT after he reduced his flecainide from 100-50 mg twice daily and switched his atenolol to a calcium channel blocker.  He underwent RUBÉN guided cardioversion 8/2018 and was started on anticoagulation given his CHADSVASc of now 3 (hypertension, age) once RUBÉN showed communication between the left atrium and left atrial appendage.  Subsequently, has been back on flecainide since 8/2017.  No recurrence of atrial arrhythmias noted  4. MR d/t severe MVP s/p Alayna mitral valve annuloplasty 6/1996 at [facility].  5. Prostate CA - had Lupron shorts 4/2002. S/p prostatectomy 5/2020    Dr. Toure saw Johnathon 7/2019 at which time he was doing well without recurrent palpations/arrhythmias. Edema was better after stopping amlodipine. He contacted us via [employer] 8/2020 and annual appt was made. He noted in his MyChart that he thought cardiac-wise he was doing well, without arrhythmias but would note palpitations if he missed a flecainide dose.     Interval History:  Notes that flecainide is working well to control palpitations. No dizziness, lightheadedness.    When he's in [place] belongs to a senior's hiking club ([employer]) and has done really well with this. Is really proud of how long/far he can hike, doing \"moderate\" hikes with good results (\"aged out\" of the difficult hikes). Never with any chest pain, pressure or tightness. In [place], walks with treadmill and does well.    No edema, orthopnea or PND. Really feeling good.    He has been checking BP and they've been between 110-150s, with " vast majority 120-130s.      Recent Diagnostic Testing:  EKG today, which I overread, showed SB 52 bpm; 1st degree AV Block, QRS duration 111 ms. NSTTW changes  Echocardiogam 7/2019 showed EF 55-60% with no RWMA. RV mildly dilated with mildly decreased RVSF. Severely dilated CYNDI. Posterior MV leaflet fixed c/w prior MV repair. Trace-mild MR @ 53bpm, mean gradient 3.3 mmHg. Trace TR. Nl RA pressure. Mild Ao Sclerosis and trace-mild AI.    Assessment & Plan:    1. Atrial Arrhythmias    Has AT and AFib. Remains on flecainide 100 mg BID with EKG as above     Remains on AC with Eliquis 5 mg BID. CHADSVASc 3 (HTN, age)    PLAN:    Continue current medications      2. H/O MV repair    Echo done 7/2019 looked good; nl EF and just trace-mild MR    On exam, no issues    PLAN:    Will plan 1 y echo    3. HTN    BPs overall look really good (110-150s, typically 120-130s)    Given dry cough, will try switching lisinopril to ARB     PLAN:    Switch lisinopril 5 mg BID to  Losartan 25 mg daily at night    Will send [employer] message in 2-3 weeks with update on BP and cough      Nakita Gayle PA-C, MSPAS      Orders Placed This Encounter   Procedures     Magnesium     Follow-Up with Electrophysiologist     EKG 12-lead complete w/read - Clinics (performed today)     EKG 12-lead complete w/read - Clinics (to be scheduled)     Echocardiogram Complete     Orders Placed This Encounter   Medications     famotidine (PEPCID) 10 MG tablet     Sig: Take 20 mg by mouth daily     losartan (COZAAR) 25 MG tablet     Sig: Take 1 tablet (25 mg) by mouth At Bedtime     Dispense:  90 tablet     Refill:  3     Replaces lisinopril     Medications Discontinued During This Encounter   Medication Reason     lisinopril (PRINIVIL/ZESTRIL) 5 MG tablet      Flaxseed, Linseed, (FLAXSEED OIL) 1200 MG CAPS      melatonin 3 MG tablet      RaNITidine HCl (RANITIDINE 150 MAX STRENGTH PO)          Encounter Diagnoses   Name Primary?     Paroxysmal atrial fibrillation  (H)      Benign essential hypertension      H/O mitral valve disease Yes       CURRENT MEDICATIONS:  Current Outpatient Medications   Medication Sig Dispense Refill     amoxicillin (AMOXIL) 500 MG capsule Take 500 mg by mouth Prior to dentist 2000mg       apixaban ANTICOAGULANT (ELIQUIS) 5 MG tablet Take by mouth 2 times daily        Ascorbic Acid (VITAMIN C) 500 MG CAPS Take 500 mg by mouth daily 500 mg in Am and 1,000 mg in PM       AZELASTINE HCL .5 mcg        calcium citrate-vitamin D (CITRACAL) 315-200 MG-UNIT TABS per tablet        Cholecalciferol (VITAMIN D3) 1000 units CAPS Take 1,000 mg by mouth daily       famotidine (PEPCID) 10 MG tablet Take 20 mg by mouth daily       flecainide (TAMBOCOR) 100 MG tablet Take 100 mg by mouth 2 times daily        ketoconazole (NIZORAL) 2 % cream Apply topically daily as needed for itching       levothyroxine (LEVOXYL) 100 MCG tablet Take 150 mcg by mouth daily        losartan (COZAAR) 25 MG tablet Take 1 tablet (25 mg) by mouth At Bedtime 90 tablet 3     magnesium 200 MG TABS Take 250 mg by mouth daily       Misc Natural Products (GLUCOSAMINE CHOND MSM FORMULA PO) Take by mouth daily       Multiple Vitamins-Minerals (VITRUM 50+ SENIOR MULTI) TABS Take by mouth daily       Potassium Gluconate 595 MG CAPS Take 595 mg by mouth daily       sildenafil (REVATIO) 20 MG tablet Take 3 tablets (20 mg) by mouth daily as needed for erectile dysfunction 90 tablet 3       ALLERGIES     Allergies   Allergen Reactions     Amlodipine Swelling     Noted at 5 mg       PAST MEDICAL HISTORY:  Past Medical History:   Diagnosis Date     Atrial flutter (H)      GERD (gastroesophageal reflux disease)      Hyperlipidemia      Hypertension      Obstructive sleep apnea      Paroxysmal atrial fibrillation (H)      Smoking history     quit 1973       PAST SURGICAL HISTORY:  Past Surgical History:   Procedure Laterality Date     CARDIOVERSION  08/07/2017     COLONOSCOPY  2015     at [facility] ([employer], [place], [place])     HC KNEE SCOPE,MED/LAT MENISCUS REPAIR Left 2016     HERNIA REPAIR  1994     Left atrial appendage ligation  1996     MR PROSTATE  WO & W CONTRAST  2016, 2018     PAROTIDECTOMY  2004    partial parotidectomy     REPAIR VALVE MITRAL  1996     SKIN CANCER SCREENING      Skin cancer surgeries-- basal on ear 2008, melanoma on left sideburn 2011, carcinoma right arm 2013     THYROIDECTOMY  2008    partial thyroidectomy       FAMILY HISTORY:  Family History   Problem Relation Age of Onset     Coronary Artery Disease Early Onset Father        SOCIAL HISTORY:  Social History     Socioeconomic History     Marital status:      Spouse name: None     Number of children: None     Years of education: None     Highest education level: None   Occupational History     None   Social Needs     Financial resource strain: None     Food insecurity     Worry: None     Inability: None     Transportation needs     Medical: None     Non-medical: None   Tobacco Use     Smoking status: Never Smoker     Smokeless tobacco: Never Used   Substance and Sexual Activity     Alcohol use: Yes     Comment: occassional     Drug use: None     Sexual activity: None   Lifestyle     Physical activity     Days per week: None     Minutes per session: None     Stress: None   Relationships     Social connections     Talks on phone: None     Gets together: None     Attends Catholic service: None     Active member of club or organization: None     Attends meetings of clubs or organizations: None     Relationship status: None     Intimate partner violence     Fear of current or ex partner: None     Emotionally abused: None     Physically abused: None     Forced sexual activity: None   Other Topics Concern     Parent/sibling w/ CABG, MI or angioplasty before 65F 55M? Not Asked   Social History Narrative     None       Review of Systems:  Skin:  Negative     Eyes:  Positive for glasses  ENT:  Negative   "  Respiratory:  Positive for cough  Cardiovascular:  Negative for;palpitations;chest pain;fatigue;dizziness;edema    Gastroenterology: Negative melena;hematochezia  Genitourinary:  Positive for prostate problem  Musculoskeletal:  Negative foot pain  Neurologic:  Negative    Psychiatric:  Negative    Heme/Lymph/Imm:  Positive for allergies  Endocrine:  Positive for thyroid disorder    Physical Exam:  Vitals: /72   Pulse (!) 49   Ht 1.753 m (5' 9\")   Wt 84.5 kg (186 lb 4.8 oz)   BMI 27.51 kg/m      Constitutional:  cooperative, alert and oriented, well developed, well nourished, in no acute distress appears younger than stated age      Skin:  warm and dry to the touch, no apparent skin lesions or masses noted        Head:  normocephalic, no masses or lesions        Eyes:  pupils equal and round;conjunctivae and lids unremarkable;sclera white        ENT:  no pallor or cyanosis, dentition good        Neck:  JVP normal;no carotid bruit        Chest:  normal breath sounds, clear to auscultation, normal A-P diameter, normal symmetry, normal respiratory excursion, no use of accessory muscles        Cardiac: regular rhythm bradycardic                Abdomen:  abdomen soft        Vascular: pulses full and equal                                      Extremities and Back:  no deformities, clubbing, cyanosis, erythema observed;no edema   Bilateral LE edema above sock, 1+    Neurological:  no gross motor deficits        Recent Lab Results:  LIPID RESULTS:  No results found for: CHOL, HDL, LDL, TRIG, CHOLHDLRATIO    LIVER ENZYME RESULTS:  No results found for: AST, ALT    CBC RESULTS:  No results found for: WBC, RBC, HGB, HCT, MCV, MCH, MCHC, RDW, PLT    BMP RESULTS:  Lab Results   Component Value Date     (L) 01/14/2019    POTASSIUM 4.2 01/14/2019    CHLORIDE 102 01/14/2019    CO2 29 01/14/2019    ANIONGAP 8.2 01/14/2019     (H) 01/14/2019    BUN 18 01/14/2019    CR 1.16 01/14/2019    GFRESTIMATED 62 " 01/14/2019    GFRESTBLACK 75 01/14/2019    CAMILO 8.9 01/14/2019          CC  Stacy San MD  [facility]  [address] [address]

## 2020-10-09 ENCOUNTER — OFFICE VISIT (OUTPATIENT)
Dept: CARDIOLOGY | Facility: CLINIC | Age: 75
End: 2020-10-09
Payer: MEDICARE

## 2020-10-09 VITALS
SYSTOLIC BLOOD PRESSURE: 137 MMHG | BODY MASS INDEX: 27.59 KG/M2 | WEIGHT: 186.3 LBS | HEART RATE: 49 BPM | DIASTOLIC BLOOD PRESSURE: 72 MMHG | HEIGHT: 69 IN

## 2020-10-09 DIAGNOSIS — I48.0 PAROXYSMAL ATRIAL FIBRILLATION (H): ICD-10-CM

## 2020-10-09 DIAGNOSIS — I10 BENIGN ESSENTIAL HYPERTENSION: ICD-10-CM

## 2020-10-09 DIAGNOSIS — Z86.79 H/O MITRAL VALVE DISEASE: Primary | ICD-10-CM

## 2020-10-09 PROCEDURE — 99214 OFFICE O/P EST MOD 30 MIN: CPT | Mod: 25 | Performed by: PHYSICIAN ASSISTANT

## 2020-10-09 PROCEDURE — 93000 ELECTROCARDIOGRAM COMPLETE: CPT | Performed by: PHYSICIAN ASSISTANT

## 2020-10-09 RX ORDER — LOSARTAN POTASSIUM 25 MG/1
25 TABLET ORAL AT BEDTIME
Qty: 90 TABLET | Refills: 3 | Status: SHIPPED | OUTPATIENT
Start: 2020-10-09 | End: 2020-11-16

## 2020-10-09 RX ORDER — FAMOTIDINE 20 MG/1
20 TABLET, FILM COATED ORAL DAILY
COMMUNITY
End: 2022-08-30

## 2020-10-09 ASSESSMENT — MIFFLIN-ST. JEOR: SCORE: 1570.43

## 2020-10-09 NOTE — LETTER
"10/9/2020    Stacy San MD, MD  Refinder by [facility] [address]  [place] [address]    RE: Zachariah RACHEL Yo       Dear Colleague,    I had the pleasure of seeing Zachariah Ram in the [facility].    HPI:   I had the pleasure of seeing Zachariah when he came for follow up of HTN.  This 75 year old sees Dr. Toure for his history of:    1. HTN with recent adjustments in his antihypertensive dosing  2. Paroxysmal AFib s/p ligation of the BENTON at the time of his mitral valve annuloplasty. Unfortunately, RUBÉN done 2017 did show some communication between the left atrium and left atrial appendage, and he has been placed on anticoagulation with ELiquis   3. AT after he reduced his flecainide from 100-50 mg twice daily and switched his atenolol to a calcium channel blocker.  He underwent RUBÉN guided cardioversion 8/2018 and was started on anticoagulation given his CHADSVASc of now 3 (hypertension, age) once RUBÉN showed communication between the left atrium and left atrial appendage.  Subsequently, has been back on flecainide since 8/2017.  No recurrence of atrial arrhythmias noted  4. MR d/t severe MVP s/p Alayna mitral valve annuloplasty 6/1996 at [facility].  5. Prostate CA - had Lupron shorts 4/2002. S/p prostatectomy 5/2020    Dr. Toure saw Johnathon 7/2019 at which time he was doing well without recurrent palpations/arrhythmias. Edema was better after stopping amlodipine. He contacted us via [employer] 8/2020 and annual appt was made. He noted in his MyChart that he thought cardiac-wise he was doing well, without arrhythmias but would note palpitations if he missed a flecainide dose.     Interval History:  Notes that flecainide is working well to control palpitations. No dizziness, lightheadedness.    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 Is really proud of how long/far he can hike, doing \"moderate\" hikes with good results (\"aged out\" of " the difficult hikes). Never with any chest pain, pressure or tightness. In [place], walks with treadmill and does well.    No edema, orthopnea or PND. Really feeling good.    He has been checking BP and they've been between 110-150s, with vast majority 120-130s.      Recent Diagnostic Testing:  EKG today, which I overread, showed SB 52 bpm; 1st degree AV Block, QRS duration 111 ms. NSTTW changes  Echocardiogam 7/2019 showed EF 55-60% with no RWMA. RV mildly dilated with mildly decreased RVSF. Severely dilated CYNDI. Posterior MV leaflet fixed c/w prior MV repair. Trace-mild MR @ 53bpm, mean gradient 3.3 mmHg. Trace TR. Nl RA pressure. Mild Ao Sclerosis and trace-mild AI.    Assessment & Plan:    1. Atrial Arrhythmias    Has AT and AFib. Remains on flecainide 100 mg BID with EKG as above     Remains on AC with Eliquis 5 mg BID. CHADSVASc 3 (HTN, age)    PLAN:    Continue current medications      2. H/O MV repair    Echo done 7/2019 looked good; nl EF and just trace-mild MR    On exam, no issues    PLAN:    Will plan 1 y echo    3. HTN    BPs overall look really good (110-150s, typically 120-130s)    Given dry cough, will try switching lisinopril to ARB     PLAN:    Switch lisinopril 5 mg BID to  Losartan 25 mg daily at night    Will send [facility] message in 2-3 weeks with update on BP and cough      Nakita Gayle PA-C, MSPAS      Orders Placed This Encounter   Procedures     Magnesium     Follow-Up with Electrophysiologist     EKG 12-lead complete w/read - Clinics (performed today)     EKG 12-lead complete w/read - Clinics (to be scheduled)     Echocardiogram Complete     Orders Placed This Encounter   Medications     famotidine (PEPCID) 10 MG tablet     Sig: Take 20 mg by mouth daily     losartan (COZAAR) 25 MG tablet     Sig: Take 1 tablet (25 mg) by mouth At Bedtime     Dispense:  90 tablet     Refill:  3     Replaces lisinopril     Medications Discontinued During This Encounter   Medication Reason     lisinopril  (PRINIVIL/ZESTRIL) 5 MG tablet      Flaxseed, Linseed, (FLAXSEED OIL) 1200 MG CAPS      melatonin 3 MG tablet      RaNITidine HCl (RANITIDINE 150 MAX STRENGTH PO)          Encounter Diagnoses   Name Primary?     Paroxysmal atrial fibrillation (H)      Benign essential hypertension      H/O mitral valve disease Yes       CURRENT MEDICATIONS:  Current Outpatient Medications   Medication Sig Dispense Refill     amoxicillin (AMOXIL) 500 MG capsule Take 500 mg by mouth Prior to dentist 2000mg       apixaban ANTICOAGULANT (ELIQUIS) 5 MG tablet Take by mouth 2 times daily        Ascorbic Acid (VITAMIN C) 500 MG CAPS Take 500 mg by mouth daily 500 mg in Am and 1,000 mg in PM       AZELASTINE HCL .5 mcg        calcium citrate-vitamin D (CITRACAL) 315-200 MG-UNIT TABS per tablet        Cholecalciferol (VITAMIN D3) 1000 units CAPS Take 1,000 mg by mouth daily       famotidine (PEPCID) 10 MG tablet Take 20 mg by mouth daily       flecainide (TAMBOCOR) 100 MG tablet Take 100 mg by mouth 2 times daily        ketoconazole (NIZORAL) 2 % cream Apply topically daily as needed for itching       levothyroxine (LEVOXYL) 100 MCG tablet Take 150 mcg by mouth daily        losartan (COZAAR) 25 MG tablet Take 1 tablet (25 mg) by mouth At Bedtime 90 tablet 3     magnesium 200 MG TABS Take 250 mg by mouth daily       Misc Natural Products (GLUCOSAMINE CHOND MSM FORMULA PO) Take by mouth daily       Multiple Vitamins-Minerals (VITRUM 50+ SENIOR MULTI) TABS Take by mouth daily       Potassium Gluconate 595 MG CAPS Take 595 mg by mouth daily       sildenafil (REVATIO) 20 MG tablet Take 3 tablets (20 mg) by mouth daily as needed for erectile dysfunction 90 tablet 3       ALLERGIES     Allergies   Allergen Reactions     Amlodipine Swelling     Noted at 5 mg       PAST MEDICAL HISTORY:  Past Medical History:   Diagnosis Date     Atrial flutter (H)      GERD (gastroesophageal reflux disease)      Hyperlipidemia      Hypertension       Obstructive sleep apnea      Paroxysmal atrial fibrillation (H)      Smoking history     quit 1973       PAST SURGICAL HISTORY:  Past Surgical History:   Procedure Laterality Date     CARDIOVERSION  08/07/2017     COLONOSCOPY  2015     at [facility] [place] [place]     HC KNEE SCOPE,MED/LAT MENISCUS REPAIR Left 2016     HERNIA REPAIR  1994     Left atrial appendage ligation  1996     MR PROSTATE  WO & W CONTRAST  2016, 2018     PAROTIDECTOMY  2004    partial parotidectomy     REPAIR VALVE MITRAL  1996     SKIN CANCER SCREENING      Skin cancer surgeries-- basal on ear 2008, melanoma on left sideburn 2011, carcinoma right arm 2013     THYROIDECTOMY  2008    partial thyroidectomy       FAMILY HISTORY:  Family History   Problem Relation Age of Onset     Coronary Artery Disease Early Onset Father        SOCIAL HISTORY:  Social History     Socioeconomic History     Marital status:      Spouse name: None     Number of children: None     Years of education: None     Highest education level: None   Occupational History     None   Social Needs     Financial resource strain: None     Food insecurity     Worry: None     Inability: None     Transportation needs     Medical: None     Non-medical: None   Tobacco Use     Smoking status: Never Smoker     Smokeless tobacco: Never Used   Substance and Sexual Activity     Alcohol use: Yes     Comment: occassional     Drug use: None     Sexual activity: None   Lifestyle     Physical activity     Days per week: None     Minutes per session: None     Stress: None   Relationships     Social connections     Talks on phone: None     Gets together: None     Attends Holiness service: None     Active member of club or organization: None     Attends meetings of clubs or organizations: None     Relationship status: None     Intimate partner violence     Fear of current or ex partner: None     Emotionally abused: None     Physically abused: None     Forced  "sexual activity: None   Other Topics Concern     Parent/sibling w/ CABG, MI or angioplasty before 65F 55M? Not Asked   Social History Narrative     None       Review of Systems:  Skin:  Negative     Eyes:  Positive for glasses  ENT:  Negative    Respiratory:  Positive for cough  Cardiovascular:  Negative for;palpitations;chest pain;fatigue;dizziness;edema    Gastroenterology: Negative melena;hematochezia  Genitourinary:  Positive for prostate problem  Musculoskeletal:  Negative foot pain  Neurologic:  Negative    Psychiatric:  Negative    Heme/Lymph/Imm:  Positive for allergies  Endocrine:  Positive for thyroid disorder    Physical Exam:  Vitals: /72   Pulse (!) 49   Ht 1.753 m (5' 9\")   Wt 84.5 kg (186 lb 4.8 oz)   BMI 27.51 kg/m      Constitutional:  cooperative, alert and oriented, well developed, well nourished, in no acute distress appears younger than stated age      Skin:  warm and dry to the touch, no apparent skin lesions or masses noted        Head:  normocephalic, no masses or lesions        Eyes:  pupils equal and round;conjunctivae and lids unremarkable;sclera white        ENT:  no pallor or cyanosis, dentition good        Neck:  JVP normal;no carotid bruit        Chest:  normal breath sounds, clear to auscultation, normal A-P diameter, normal symmetry, normal respiratory excursion, no use of accessory muscles        Cardiac: regular rhythm bradycardic                Abdomen:  abdomen soft        Vascular: pulses full and equal                                      Extremities and Back:  no deformities, clubbing, cyanosis, erythema observed;no edema   Bilateral LE edema above sock, 1+    Neurological:  no gross motor deficits        Recent Lab Results:  LIPID RESULTS:  No results found for: CHOL, HDL, LDL, TRIG, CHOLHDLRATIO    LIVER ENZYME RESULTS:  No results found for: AST, ALT    CBC RESULTS:  No results found for: WBC, RBC, HGB, HCT, MCV, MCH, MCHC, RDW, PLT    BMP RESULTS:  Lab Results "   Component Value Date     (L) 01/14/2019    POTASSIUM 4.2 01/14/2019    CHLORIDE 102 01/14/2019    CO2 29 01/14/2019    ANIONGAP 8.2 01/14/2019     (H) 01/14/2019    BUN 18 01/14/2019    CR 1.16 01/14/2019    GFRESTIMATED 62 01/14/2019    GFRESTBLACK 75 01/14/2019    CAMILO 8.9 01/14/2019          Thank you for allowing me to participate in the care of your patient.    Sincerely,     Sera Gayle PA-C     [facility]

## 2020-10-09 NOTE — LETTER
"10/9/2020    Stacy San MD, MD  [facility] [address]  [place] [address]    RE: Zachariah RACHEL Yo       Dear Colleague,    I had the pleasure of seeing Zachariah Ram in the [facility].    HPI:   I had the pleasure of seeing Zachariah when he came for follow up of HTN.  This 75 year old sees Dr. Toure for his history of:    1. HTN with recent adjustments in his antihypertensive dosing  2. Paroxysmal AFib s/p ligation of the BENTON at the time of his mitral valve annuloplasty. Unfortunately, RUBÉN done 2017 did show some communication between the left atrium and left atrial appendage, and he has been placed on anticoagulation with ELiquis   3. AT after he reduced his flecainide from 100-50 mg twice daily and switched his atenolol to a calcium channel blocker.  He underwent RUBÉN guided cardioversion 8/2018 and was started on anticoagulation given his CHADSVASc of now 3 (hypertension, age) once RUBÉN showed communication between the left atrium and left atrial appendage.  Subsequently, has been back on flecainide since 8/2017.  No recurrence of atrial arrhythmias noted  4. MR d/t severe MVP s/p Alayna mitral valve annuloplasty 6/1996 at [facility].  5. Prostate CA - had Lupron shorts 4/2002. S/p prostatectomy 5/2020    Dr. Toure saw Johnathon 7/2019 at which time he was doing well without recurrent palpations/arrhythmias. Edema was better after stopping amlodipine. He contacted us via [employer] 8/2020 and annual appt was made. He noted in his MyChart that he thought cardiac-wise he was doing well, without arrhythmias but would note palpitations if he missed a flecainide dose.     Interval History:  Notes that flecainide is working well to control palpitations. No dizziness, lightheadedness.    When he's in [place] belongs to a senior's hiking club ([employer]) and has done really well with this. Is really proud of how long/far he can hike, doing \"moderate\" hikes with good results (\"aged out\" of " the difficult hikes). Never with any chest pain, pressure or tightness. In [place], walks with treadmill and does well.    No edema, orthopnea or PND. Really feeling good.    He has been checking BP and they've been between 110-150s, with vast majority 120-130s.      Recent Diagnostic Testing:  EKG today, which I overread, showed SB 52 bpm; 1st degree AV Block, QRS duration 111 ms. NSTTW changes  Echocardiogam 7/2019 showed EF 55-60% with no RWMA. RV mildly dilated with mildly decreased RVSF. Severely dilated CYNDI. Posterior MV leaflet fixed c/w prior MV repair. Trace-mild MR @ 53bpm, mean gradient 3.3 mmHg. Trace TR. Nl RA pressure. Mild Ao Sclerosis and trace-mild AI.    Assessment & Plan:    1. Atrial Arrhythmias    Has AT and AFib. Remains on flecainide 100 mg BID with EKG as above     Remains on AC with Eliquis 5 mg BID. CHADSVASc 3 (HTN, age)    PLAN:    Continue current medications      2. H/O MV repair    Echo done 7/2019 looked good; nl EF and just trace-mild MR    On exam, no issues    PLAN:    Will plan 1 y echo    3. HTN    BPs overall look really good (110-150s, typically 120-130s)    Given dry cough, will try switching lisinopril to ARB     PLAN:    Switch lisinopril 5 mg BID to  Losartan 25 mg daily at night    Will send [employer] message in 2-3 weeks with update on BP and cough      Nakita Gayle PA-C, MSPAS      Orders Placed This Encounter   Procedures     Magnesium     Follow-Up with Electrophysiologist     EKG 12-lead complete w/read - Clinics (performed today)     EKG 12-lead complete w/read - Clinics (to be scheduled)     Echocardiogram Complete     Orders Placed This Encounter   Medications     famotidine (PEPCID) 10 MG tablet     Sig: Take 20 mg by mouth daily     losartan (COZAAR) 25 MG tablet     Sig: Take 1 tablet (25 mg) by mouth At Bedtime     Dispense:  90 tablet     Refill:  3     Replaces lisinopril     Medications Discontinued During This Encounter   Medication Reason     lisinopril  (PRINIVIL/ZESTRIL) 5 MG tablet      Flaxseed, Linseed, (FLAXSEED OIL) 1200 MG CAPS      melatonin 3 MG tablet      RaNITidine HCl (RANITIDINE 150 MAX STRENGTH PO)          Encounter Diagnoses   Name Primary?     Paroxysmal atrial fibrillation (H)      Benign essential hypertension      H/O mitral valve disease Yes       CURRENT MEDICATIONS:  Current Outpatient Medications   Medication Sig Dispense Refill     amoxicillin (AMOXIL) 500 MG capsule Take 500 mg by mouth Prior to dentist 2000mg       apixaban ANTICOAGULANT (ELIQUIS) 5 MG tablet Take by mouth 2 times daily        Ascorbic Acid (VITAMIN C) 500 MG CAPS Take 500 mg by mouth daily 500 mg in Am and 1,000 mg in PM       AZELASTINE HCL .5 mcg        calcium citrate-vitamin D (CITRACAL) 315-200 MG-UNIT TABS per tablet        Cholecalciferol (VITAMIN D3) 1000 units CAPS Take 1,000 mg by mouth daily       famotidine (PEPCID) 10 MG tablet Take 20 mg by mouth daily       flecainide (TAMBOCOR) 100 MG tablet Take 100 mg by mouth 2 times daily        ketoconazole (NIZORAL) 2 % cream Apply topically daily as needed for itching       levothyroxine (LEVOXYL) 100 MCG tablet Take 150 mcg by mouth daily        losartan (COZAAR) 25 MG tablet Take 1 tablet (25 mg) by mouth At Bedtime 90 tablet 3     magnesium 200 MG TABS Take 250 mg by mouth daily       Misc Natural Products (GLUCOSAMINE CHOND MSM FORMULA PO) Take by mouth daily       Multiple Vitamins-Minerals (VITRUM 50+ SENIOR MULTI) TABS Take by mouth daily       Potassium Gluconate 595 MG CAPS Take 595 mg by mouth daily       sildenafil (REVATIO) 20 MG tablet Take 3 tablets (20 mg) by mouth daily as needed for erectile dysfunction 90 tablet 3       ALLERGIES     Allergies   Allergen Reactions     Amlodipine Swelling     Noted at 5 mg       PAST MEDICAL HISTORY:  Past Medical History:   Diagnosis Date     Atrial flutter (H)      GERD (gastroesophageal reflux disease)      Hyperlipidemia      Hypertension       Obstructive sleep apnea      Paroxysmal atrial fibrillation (H)      Smoking history     quit 1973       PAST SURGICAL HISTORY:  Past Surgical History:   Procedure Laterality Date     CARDIOVERSION  08/07/2017     COLONOSCOPY  2015     [facility] ([facility] [place] [place]     HC KNEE SCOPE,MED/LAT MENISCUS REPAIR Left 2016     HERNIA REPAIR  1994     Left atrial appendage ligation  1996     MR PROSTATE  WO & W CONTRAST  2016, 2018     PAROTIDECTOMY  2004    partial parotidectomy     REPAIR VALVE MITRAL  1996     SKIN CANCER SCREENING      Skin cancer surgeries-- basal on ear 2008, melanoma on left sideburn 2011, carcinoma right arm 2013     THYROIDECTOMY  2008    partial thyroidectomy       FAMILY HISTORY:  Family History   Problem Relation Age of Onset     Coronary Artery Disease Early Onset Father        SOCIAL HISTORY:  Social History     Socioeconomic History     Marital status:      Spouse name: None     Number of children: None     Years of education: None     Highest education level: None   Occupational History     None   Social Needs     Financial resource strain: None     Food insecurity     Worry: None     Inability: None     Transportation needs     Medical: None     Non-medical: None   Tobacco Use     Smoking status: Never Smoker     Smokeless tobacco: Never Used   Substance and Sexual Activity     Alcohol use: Yes     Comment: occassional     Drug use: None     Sexual activity: None   Lifestyle     Physical activity     Days per week: None     Minutes per session: None     Stress: None   Relationships     Social connections     Talks on phone: None     Gets together: None     Attends Episcopal service: None     Active member of club or organization: None     Attends meetings of clubs or organizations: None     Relationship status: None     Intimate partner violence     Fear of current or ex partner: None     Emotionally abused: None     Physically abused: None     Forced  "sexual activity: None   Other Topics Concern     Parent/sibling w/ CABG, MI or angioplasty before 65F 55M? Not Asked   Social History Narrative     None       Review of Systems:  Skin:  Negative     Eyes:  Positive for glasses  ENT:  Negative    Respiratory:  Positive for cough  Cardiovascular:  Negative for;palpitations;chest pain;fatigue;dizziness;edema    Gastroenterology: Negative melena;hematochezia  Genitourinary:  Positive for prostate problem  Musculoskeletal:  Negative foot pain  Neurologic:  Negative    Psychiatric:  Negative    Heme/Lymph/Imm:  Positive for allergies  Endocrine:  Positive for thyroid disorder    Physical Exam:  Vitals: /72   Pulse (!) 49   Ht 1.753 m (5' 9\")   Wt 84.5 kg (186 lb 4.8 oz)   BMI 27.51 kg/m      Constitutional:  cooperative, alert and oriented, well developed, well nourished, in no acute distress appears younger than stated age      Skin:  warm and dry to the touch, no apparent skin lesions or masses noted        Head:  normocephalic, no masses or lesions        Eyes:  pupils equal and round;conjunctivae and lids unremarkable;sclera white        ENT:  no pallor or cyanosis, dentition good        Neck:  JVP normal;no carotid bruit        Chest:  normal breath sounds, clear to auscultation, normal A-P diameter, normal symmetry, normal respiratory excursion, no use of accessory muscles        Cardiac: regular rhythm bradycardic                Abdomen:  abdomen soft        Vascular: pulses full and equal                                      Extremities and Back:  no deformities, clubbing, cyanosis, erythema observed;no edema   Bilateral LE edema above sock, 1+    Neurological:  no gross motor deficits        Recent Lab Results:  LIPID RESULTS:  No results found for: CHOL, HDL, LDL, TRIG, CHOLHDLRATIO    LIVER ENZYME RESULTS:  No results found for: AST, ALT    CBC RESULTS:  No results found for: WBC, RBC, HGB, HCT, MCV, MCH, MCHC, RDW, PLT    BMP RESULTS:  Lab Results "   Component Value Date     (L) 01/14/2019    POTASSIUM 4.2 01/14/2019    CHLORIDE 102 01/14/2019    CO2 29 01/14/2019    ANIONGAP 8.2 01/14/2019     (H) 01/14/2019    BUN 18 01/14/2019    CR 1.16 01/14/2019    GFRESTIMATED 62 01/14/2019    GFRESTBLACK 75 01/14/2019    CAMILO 8.9 01/14/2019          CC  Stacy San MD  [place] [address] [address] [id number]                        Thank you for allowing me to participate in the care of your patient.      Sincerely,     Sera Gayle PA-C     [facility] [place] [facility]    cc:   Stacy San MD  [place] [address] [address] [id number]

## 2020-10-09 NOTE — PATIENT INSTRUCTIONS
"Johnathon - it was nice to see you today!    1. Reviewed your BPs - they look good.  Range 110-150s, majority in 120-130s  2. Discussed dry cough  - most likely due to lisinopril    PLAN:  1. For cough, switch lisinopril 5 mg twice a day to losartan 25 mg daily at night.    2. Send [employer] message 2-3 weeks after starting it to see how cough is off this medication. If it's gone, will put on Medication Intolerance list. Let me know if it's controlling BP as well as the lisinopril    3. I gave \"lab order\" request for magnesium    4. See us back 1 year with EKG and echo but CALL if issues!  [phone]  "

## 2020-10-15 ENCOUNTER — TRANSFERRED RECORDS (OUTPATIENT)
Dept: MULTI SPECIALTY CLINIC | Facility: CLINIC | Age: 75
End: 2020-10-15

## 2020-11-16 ENCOUNTER — MYC MEDICAL ADVICE (OUTPATIENT)
Dept: CARDIOLOGY | Facility: CLINIC | Age: 75
End: 2020-11-16

## 2020-11-16 DIAGNOSIS — I10 BENIGN ESSENTIAL HYPERTENSION: ICD-10-CM

## 2020-11-16 RX ORDER — LOSARTAN POTASSIUM 50 MG/1
50 TABLET ORAL AT BEDTIME
Qty: 90 TABLET | Refills: 3 | Status: SHIPPED | OUTPATIENT
Start: 2020-11-16 | End: 2020-12-09

## 2020-12-07 ENCOUNTER — MYC MEDICAL ADVICE (OUTPATIENT)
Dept: CARDIOLOGY | Facility: CLINIC | Age: 75
End: 2020-12-07

## 2020-12-24 NOTE — TELEPHONE ENCOUNTER
MEDICAL RECORDS REQUEST   [facility]  Urology Clinic  [address] [place] [address]  PHONE: [phone]  Fax: [phone]        FUTURE VISIT INFORMATION                                                   Zachariah aRm, : 1945 scheduled for future visit at [facility]    APPOINTMENT INFORMATION:    Date: 2/10/2021 12:30PM    Provider:  Tommy Sanchez MD    Reason for Visit/Diagnosis: ED Issues     REFERRAL INFORMATION:    Referring provider:  N/A    Specialty: N/A    Referring providers clinic:  [facility] contact number:  N/A    RECORDS REQUESTED FOR VISIT                                                     NOTES  STATUS/DETAILS   OFFICE NOTE from referring provider  yes   OFFICE NOTE from other specialist  no   DISCHARGE SUMMARY from hospital  no   DISCHARGE REPORT from the ER  no   OPERATIVE REPORT  no   MEDICATION LIST  no     PRE-VISIT CHECKLIST      Record collection complete Yes   Appointment appropriately scheduled           (right time/right provider) Yes   MyChart activation Yes   Questionnaire complete If no, please explain: In process     Completed by: Radha Carrasquillo

## 2021-01-08 ENCOUNTER — MYC MEDICAL ADVICE (OUTPATIENT)
Dept: CARDIOLOGY | Facility: CLINIC | Age: 76
End: 2021-01-08

## 2021-01-08 DIAGNOSIS — I10 BENIGN ESSENTIAL HYPERTENSION: ICD-10-CM

## 2021-01-11 RX ORDER — LOSARTAN POTASSIUM 50 MG/1
100 TABLET ORAL AT BEDTIME
Qty: 180 TABLET | Refills: 3 | Status: SHIPPED | OUTPATIENT
Start: 2021-01-11 | End: 2021-04-28

## 2021-01-11 NOTE — TELEPHONE ENCOUNTER
Pls let him know that sounds like a plan and send the losartan Rx in per request    alma rosa kendrick

## 2021-01-14 ENCOUNTER — OFFICE VISIT (OUTPATIENT)
Dept: SURGERY | Facility: CLINIC | Age: 76
End: 2021-01-14
Payer: MEDICARE

## 2021-01-14 ENCOUNTER — TELEPHONE (OUTPATIENT)
Dept: SURGERY | Facility: CLINIC | Age: 76
End: 2021-01-14

## 2021-01-14 VITALS
BODY MASS INDEX: 26.96 KG/M2 | OXYGEN SATURATION: 99 % | WEIGHT: 182 LBS | DIASTOLIC BLOOD PRESSURE: 60 MMHG | HEART RATE: 57 BPM | SYSTOLIC BLOOD PRESSURE: 138 MMHG | HEIGHT: 69 IN

## 2021-01-14 DIAGNOSIS — K40.90 LEFT INGUINAL HERNIA: Primary | ICD-10-CM

## 2021-01-14 DIAGNOSIS — Z11.59 ENCOUNTER FOR SCREENING FOR OTHER VIRAL DISEASES: ICD-10-CM

## 2021-01-14 PROCEDURE — 99204 OFFICE O/P NEW MOD 45 MIN: CPT | Performed by: SURGERY

## 2021-01-14 ASSESSMENT — MIFFLIN-ST. JEOR: SCORE: 1550.93

## 2021-01-14 NOTE — TELEPHONE ENCOUNTER
Type of surgery: Open left inguinal hernia repair  Location of surgery: [facility]  Date and time of surgery: 1/27/21 at 9:30am  Surgeon: Dr. Bernard Caballero  Pre-Op Appt Date: Patient to schedule  Post-Op Appt Date: Patient to schedule   Packet sent out: Yes  Pre-cert/Authorization completed:  Not Applicable  Date: 1/14/21

## 2021-01-14 NOTE — PROGRESS NOTES
"[facility]  Surgery Consultation    Primary care provider:  Edu Reyes [phone]    HPI: This patient is a 75-year-old gentleman who presents as a self-referral for evaluation of a left inguinal hernia.  He noted this approximately 2 to 3 months ago.  It causes minimal discomfort for him.  He noticed this initially during the course of his normal daily stretching activities.  He has no signs or symptoms to suggest incarceration or strangulation.  He has no GI or bowel obstruction symptoms.  He has a prior history of open right inguinal hernia repair.  He also has a history of robotic assisted laparoscopic prostatectomy.    PMH:   has a past medical history of Atrial flutter (H), GERD (gastroesophageal reflux disease), Hyperlipidemia, Hypertension, Obstructive sleep apnea, Paroxysmal atrial fibrillation (H), and Smoking history.  PSH:    has a past surgical history that includes Cardioversion (08/07/2017); Repair valve mitral (1996); Left atrial appendage ligation (1996); hernia repair (1994); Parotidectomy (2004); Thyroidectomy (2008); skin cancer screening; KNEE SCOPE,MED OR LAT MENIS REPAIR (Left, 2016); MR Prostate wo & w Contrast (2016, 2018); and colonoscopy (2015).  Social History:   reports that he has never smoked. He has never used smokeless tobacco. He reports current alcohol use.  Family History:  family history includes Coronary Artery Disease Early Onset in his father.  Medications/Allergies: Home medications and allergies reviewed.    ROS:  The 10 point Review of Systems is negative other than noted in the HPI.    Physical Exam:  /60 (BP Location: Right arm, Patient Position: Sitting, Cuff Size: Adult Regular)   Pulse 57   Ht 1.753 m (5' 9\")   Wt 82.6 kg (182 lb)   SpO2 99%   BMI 26.88 kg/m    GENERAL: Generally appears well.  Psych: Alert and Oriented.  Normal affect  Eyes: Sclera clear  Respiratory:  Lungs clear to ausculation bilaterally with good air " excursion  Cardiovascular:  Regular Rate and Rhythm with no murmurs gallops or rubs, normal peripheral pulses  GI: Abdomen Non Distended Non-Tender  No hernias palpated..  Groin- I examined the patient in both the standing and supine positions. Right Groin- No hernia Palpated. Left Groin- Small inguinal hernia.  Hernia was easily reduciable. No scrotal or testicle abnormalities.  Lymphatic/Hematologic/Immune:  No femoral or cervical lymphadenopathy.  Integumentary:  No rashes  Neurological: grossly intact     All new lab and imaging data was reviewed.     Impression and Plan:  Patient is a 75 year old male with left inguinal hernia    PLAN: Recommend outpatient open repair at his convenience  I discussed the pathophysiology of hernias and options for repair including laparoscopic VS open.  The risks associated with the procedure including, but not limited to, recurrence, nerve entrapment or injury, persistence of pain, injury to the bowel/bladder, infertility, hematoma, mesh migration, mesh infection, MI, and PE were discussed with the patient. He indicated understanding of the discussion, asked appropriate questions, and provided consent. Signs and symptoms of incarceration were discussed. If these develop in the interim, he promises to call or go straight to the ER. I have provided the patient with an information pamphlet.    Thank you very much for this consult.    Bernard Caballero M.D.  [facility]  [phone]    Please route or send letter to:  Primary Care Provider (PCP) and Referring Provider

## 2021-01-15 ENCOUNTER — HEALTH MAINTENANCE LETTER (OUTPATIENT)
Age: 76
End: 2021-01-15

## 2021-01-15 NOTE — TELEPHONE ENCOUNTER
Spoke with patient to let him know that Nakita is in agreement with the plan. Sent losartan refill.    HLD (hyperlipidemia) HLD (hyperlipidemia)

## 2021-01-23 ENCOUNTER — OFFICE VISIT (OUTPATIENT)
Dept: URGENT CARE | Facility: URGENT CARE | Age: 76
End: 2021-01-23
Payer: MEDICARE

## 2021-01-23 DIAGNOSIS — Z11.59 ENCOUNTER FOR SCREENING FOR OTHER VIRAL DISEASES: ICD-10-CM

## 2021-01-23 LAB
SARS-COV-2 RNA RESP QL NAA+PROBE: NORMAL
SPECIMEN SOURCE: NORMAL

## 2021-01-23 PROCEDURE — 87635 SARS-COV-2 COVID-19 AMP PRB: CPT | Performed by: SURGERY

## 2021-01-24 LAB
LABORATORY COMMENT REPORT: NORMAL
SARS-COV-2 RNA RESP QL NAA+PROBE: NEGATIVE
SPECIMEN SOURCE: NORMAL

## 2021-01-26 ENCOUNTER — ANESTHESIA EVENT (OUTPATIENT)
Dept: SURGERY | Facility: CLINIC | Age: 76
End: 2021-01-26
Payer: MEDICARE

## 2021-01-26 RX ORDER — NYSTATIN 100000 U/G
OINTMENT TOPICAL 2 TIMES DAILY
COMMUNITY
End: 2021-03-01

## 2021-01-26 RX ORDER — METHYLPREDNISOLONE 4 MG
1 TABLET, DOSE PACK ORAL DAILY
COMMUNITY
End: 2021-03-01

## 2021-01-26 NOTE — ANESTHESIA PREPROCEDURE EVALUATION
Anesthesia Pre-Procedure Evaluation    Patient: Zachariah Ram   MRN: [id number] : 1945        Preoperative Diagnosis: Left inguinal hernia [K40.90]   Procedure : Procedure(s):  OPEN LEFT INGUINAL HERNIA REPAIR     Past Medical History:   Diagnosis Date     Antiplatelet or antithrombotic long-term use      Arrhythmia     A FIB     Atrial flutter (H)      BPH (benign prostatic hyperplasia)      ED (erectile dysfunction)      Elevated prostate specific antigen (PSA)      GERD (gastroesophageal reflux disease)      Heart murmur     MVP     History of skin cancer      Hyperlipidemia      Hypertension      Insomnia      Left inguinal hernia      Lentigo maligna melanoma (H)      MVP (mitral valve prolapse)      Nasal congestion      Obstructive sleep apnea     SEVERE    -  USES CPAP     Paroxysmal atrial fibrillation (H)      Schatzki's ring      Seborrheic dermatitis      Smoking history     quit 1973     Urinary frequency       Past Surgical History:   Procedure Laterality Date     CARDIOVERSION  2017     COLONOSCOPY       [facility] [place] [place]     EGD, GASTROESOPHAGEAL REFLUX TEST WITH MUCOSAL PH ELECTRODE       HC KNEE SCOPE,MED/LAT MENISCUS REPAIR Left 2016     HERNIA REPAIR       LASIK       Left atrial appendage ligation       MR PROSTATE  WO & W CONTRAST  ,      PAROTIDECTOMY  2004    partial parotidectomy     REPAIR VALVE MITRAL  1996     SKIN CANCER SCREENING      Skin cancer surgeries-- basal on ear , melanoma on left sideburn , carcinoma right arm 2013     THYROIDECTOMY  2008    partial thyroidectomy      Allergies   Allergen Reactions     Amlodipine Swelling     Noted at 5 mg     Extract Of Poison Ivy      Other Food Allergy      Oats      Social History     Tobacco Use     Smoking status: Never Smoker     Smokeless tobacco: Never Used   Substance Use Topics     Alcohol use: Yes     Comment: occassional      Wt Readings from Last 1  Encounters:   01/14/21 82.6 kg (182 lb)      Echo 2019     Interpretation Summary     Left ventricular systolic function is normal.  The visual ejection fraction is estimated at 55-60%.  The posterior mitral leaflet is thickened and fixed consistent with prior  mitral repair surgery.  An annuloplasty ring is noted in the mitral position.  Normal prosthetic mitral valve gradients.  There is trace to mild mitral regurgitation.  The mean mitral valve gradient is 3.3mmHg.  (53 bpm)  The study was technically difficult. There is no comparison study available.  _____________________________________________________________________________  __        Left Ventricle  The left ventricle is normal in size. There is normal left ventricular wall  thickness. Left ventricular systolic function is normal. The visual ejection  fraction is estimated at 55-60%. Left ventricular diastolic function is  indeterminate. No regional wall motion abnormalities noted.     Right Ventricle  The right ventricle is mildly dilated. Mildly decreased right ventricular  systolic function.     Atria  The left atrium is severely dilated. The right atrium is severely dilated.     Mitral Valve  The posterior mitral leaflet is thickened and fixed consistent with prior  mitral repair surgery. There is trace to mild mitral regurgitation. (53 bpm).  The mean mitral valve gradient is 3.3mmHg. An annuloplasty ring is noted in  the mitral position. Normal prosthetic mitral valve gradients.     Tricuspid Valve  There is trace tricuspid regurgitation. Right ventricular systolic pressure  could not be approximated due to inadequate tricuspid regurgitation. IVC  diameter <2.1 cm collapsing >50% with sniff suggests a normal RA pressure of 3  mmHg.        Aortic Valve  There is mild trileaflet aortic sclerosis. There is trace to mild aortic  regurgitation. No aortic stenosis is present.     Pulmonic Valve  The pulmonic valve is not well visualized.     Vessels  The  aortic root is normal size.     Pericardium  There is no pericardial effusion.     Rhythm  Sinus rhythm was noted.    EKG - SB, first degree AVB, can not rule out anterior MI    Anesthesia Evaluation   Pt has had prior anesthetic.         ROS/MED HX  ENT/Pulmonary:     (+) sleep apnea, severe, uses CPAP,  (-) recent URI   Neurologic:  - neg neurologic ROS  (-) no seizures, no CVA and migraines   Cardiovascular:     (+) hypertension-----Taking blood thinners dysrhythmias (prior cardioversion; currently managed with flecanide), a-fib and a-flutter, valvular problems/murmurs type: MVP s/p repair 1996.  (-) CHF and orthopnea/PND   METS/Exercise Tolerance:     Hematologic:  - neg hematologic  ROS     Musculoskeletal:  - neg musculoskeletal ROS     GI/Hepatic: Comment: Schatzki's ring        (+) GERD, Asymptomatic on medication, hiatal hernia,     Renal/Genitourinary:     (+) BPH,     Endo:     (+) thyroid problem, hypothyroidism,     Psychiatric/Substance Use: Comment: insomnia      Infectious Disease:  - neg infectious disease ROS     Malignancy:   (+) Malignancy, History of Skin and Prostate.Prostate CA status post Surgery.        Other:            Physical Exam    Airway        Mallampati: III   TM distance: > 3 FB   Neck ROM: limited   Mouth opening: > 3 cm    Respiratory Devices and Support         Dental  no notable dental history         Cardiovascular   cardiovascular exam normal       Rhythm and rate: regular and normal     Pulmonary   pulmonary exam normal        breath sounds clear to auscultation           OUTSIDE LABS:  CBC: No results found for: WBC, HGB, HCT, PLT  BMP:   Lab Results   Component Value Date     (L) 01/14/2019    POTASSIUM 4.2 01/14/2019    CHLORIDE 102 01/14/2019    CO2 29 01/14/2019    BUN 18 01/14/2019    CR 1.16 01/14/2019     (H) 01/14/2019     COAGS: No results found for: PTT, INR, FIBR  POC: No results found for: BGM, HCG, HCGS  HEPATIC: No results found for: ALBUMIN,  PROTTOTAL, ALT, AST, GGT, ALKPHOS, BILITOTAL, BILIDIRECT, KARIN  OTHER:   Lab Results   Component Value Date    CAMILO 8.9 01/14/2019       Anesthesia Plan     History & Physical Review      ASA Status:  2. NPO Status:  NPO Appropriate. .  Plan for General with Intravenous and Propofol induction. Device: ETT Maintenance will be Balanced.         PONV prophylaxis:  Ondansetron (or other 5HT-3) and Dexamethasone or Solumedrol.       Consents  Anesthesia Plan(s) and associated risks, benefits, and realistic alternatives discussed.    Questions answered and patient/representative(s) expressed understanding.    Discussed with:  Patient.             Postoperative Care  Postoperative pain management: Multi-modal analgesia.           John Ren MD

## 2021-01-27 ENCOUNTER — HOSPITAL ENCOUNTER (OUTPATIENT)
Facility: CLINIC | Age: 76
Discharge: HOME | End: 2021-01-27
Attending: SURGERY | Admitting: SURGERY
Payer: MEDICARE

## 2021-01-27 ENCOUNTER — ANESTHESIA (OUTPATIENT)
Dept: SURGERY | Facility: CLINIC | Age: 76
End: 2021-01-27
Payer: MEDICARE

## 2021-01-27 ENCOUNTER — APPOINTMENT (OUTPATIENT)
Dept: SURGERY | Facility: PHYSICIAN GROUP | Age: 76
End: 2021-01-27
Payer: MEDICARE

## 2021-01-27 ENCOUNTER — SURGERY (OUTPATIENT)
Age: 76
End: 2021-01-27
Payer: MEDICARE

## 2021-01-27 VITALS
HEART RATE: 52 BPM | WEIGHT: 185.7 LBS | TEMPERATURE: 96.9 F | RESPIRATION RATE: 16 BRPM | BODY MASS INDEX: 27.5 KG/M2 | OXYGEN SATURATION: 98 % | HEIGHT: 69 IN | SYSTOLIC BLOOD PRESSURE: 140 MMHG | DIASTOLIC BLOOD PRESSURE: 68 MMHG

## 2021-01-27 DIAGNOSIS — G89.18 ACUTE POST-OPERATIVE PAIN: Primary | ICD-10-CM

## 2021-01-27 DIAGNOSIS — K40.90 LEFT INGUINAL HERNIA: ICD-10-CM

## 2021-01-27 PROCEDURE — 49505 PRP I/HERN INIT REDUC >5 YR: CPT | Mod: LT | Performed by: PHYSICIAN ASSISTANT

## 2021-01-27 PROCEDURE — 360N000075 HC SURGERY LEVEL 2, PER MIN: Performed by: SURGERY

## 2021-01-27 PROCEDURE — 999N000141 HC STATISTIC PRE-PROCEDURE NURSING ASSESSMENT: Performed by: SURGERY

## 2021-01-27 PROCEDURE — 710N000009 HC RECOVERY PHASE 1, LEVEL 1, PER MIN: Performed by: SURGERY

## 2021-01-27 PROCEDURE — 250N000011 HC RX IP 250 OP 636: Performed by: NURSE ANESTHETIST, CERTIFIED REGISTERED

## 2021-01-27 PROCEDURE — C1781 MESH (IMPLANTABLE): HCPCS | Performed by: SURGERY

## 2021-01-27 PROCEDURE — 250N000011 HC RX IP 250 OP 636: Performed by: SURGERY

## 2021-01-27 PROCEDURE — 250N000009 HC RX 250: Performed by: NURSE ANESTHETIST, CERTIFIED REGISTERED

## 2021-01-27 PROCEDURE — 250N000013 HC RX MED GY IP 250 OP 250 PS 637: Performed by: PHYSICIAN ASSISTANT

## 2021-01-27 PROCEDURE — 370N000017 HC ANESTHESIA TECHNICAL FEE, PER MIN: Performed by: SURGERY

## 2021-01-27 PROCEDURE — 250N000009 HC RX 250: Performed by: SURGERY

## 2021-01-27 PROCEDURE — 258N000003 HC RX IP 258 OP 636: Performed by: NURSE ANESTHETIST, CERTIFIED REGISTERED

## 2021-01-27 PROCEDURE — 49505 PRP I/HERN INIT REDUC >5 YR: CPT | Mod: LT | Performed by: SURGERY

## 2021-01-27 PROCEDURE — 710N000012 HC RECOVERY PHASE 2, PER MINUTE: Performed by: SURGERY

## 2021-01-27 PROCEDURE — 250N000025 HC SEVOFLURANE, PER MIN: Performed by: SURGERY

## 2021-01-27 PROCEDURE — 250N000013 HC RX MED GY IP 250 OP 250 PS 637: Performed by: ANESTHESIOLOGY

## 2021-01-27 PROCEDURE — 272N000001 HC OR GENERAL SUPPLY STERILE: Performed by: SURGERY

## 2021-01-27 DEVICE — MESH PROGRIP 4.7X3" PARIETEX LEFT TEM1208GL: Type: IMPLANTABLE DEVICE | Site: GROIN | Status: FUNCTIONAL

## 2021-01-27 RX ORDER — BUPIVACAINE HYDROCHLORIDE AND EPINEPHRINE 5; 5 MG/ML; UG/ML
INJECTION, SOLUTION EPIDURAL; INTRACAUDAL; PERINEURAL
Status: DISCONTINUED
Start: 2021-01-27 | End: 2021-01-27 | Stop reason: HOSPADM

## 2021-01-27 RX ORDER — MAGNESIUM HYDROXIDE 1200 MG/15ML
LIQUID ORAL PRN
Status: DISCONTINUED | OUTPATIENT
Start: 2021-01-27 | End: 2021-01-27 | Stop reason: HOSPADM

## 2021-01-27 RX ORDER — HYDROCODONE BITARTRATE AND ACETAMINOPHEN 5; 325 MG/1; MG/1
1-2 TABLET ORAL EVERY 4 HOURS PRN
Qty: 12 TABLET | Refills: 0 | Status: SHIPPED | OUTPATIENT
Start: 2021-01-27 | End: 2021-02-08

## 2021-01-27 RX ORDER — GLYCOPYRROLATE 0.2 MG/ML
INJECTION, SOLUTION INTRAMUSCULAR; INTRAVENOUS PRN
Status: DISCONTINUED | OUTPATIENT
Start: 2021-01-27 | End: 2021-01-27

## 2021-01-27 RX ORDER — ONDANSETRON 2 MG/ML
INJECTION INTRAMUSCULAR; INTRAVENOUS PRN
Status: DISCONTINUED | OUTPATIENT
Start: 2021-01-27 | End: 2021-01-27

## 2021-01-27 RX ORDER — NALOXONE HYDROCHLORIDE 0.4 MG/ML
0.4 INJECTION, SOLUTION INTRAMUSCULAR; INTRAVENOUS; SUBCUTANEOUS
Status: DISCONTINUED | OUTPATIENT
Start: 2021-01-27 | End: 2021-01-27 | Stop reason: HOSPADM

## 2021-01-27 RX ORDER — ACETAMINOPHEN 500 MG
1000 TABLET ORAL ONCE
Status: COMPLETED | OUTPATIENT
Start: 2021-01-27 | End: 2021-01-27

## 2021-01-27 RX ORDER — BUPIVACAINE HYDROCHLORIDE AND EPINEPHRINE 5; 5 MG/ML; UG/ML
INJECTION, SOLUTION PERINEURAL PRN
Status: DISCONTINUED | OUTPATIENT
Start: 2021-01-27 | End: 2021-01-27 | Stop reason: HOSPADM

## 2021-01-27 RX ORDER — MEPERIDINE HYDROCHLORIDE 25 MG/ML
12.5 INJECTION INTRAMUSCULAR; INTRAVENOUS; SUBCUTANEOUS
Status: DISCONTINUED | OUTPATIENT
Start: 2021-01-27 | End: 2021-01-27 | Stop reason: HOSPADM

## 2021-01-27 RX ORDER — CEFAZOLIN SODIUM 1 G/3ML
1 INJECTION, POWDER, FOR SOLUTION INTRAMUSCULAR; INTRAVENOUS SEE ADMIN INSTRUCTIONS
Status: DISCONTINUED | OUTPATIENT
Start: 2021-01-27 | End: 2021-01-27 | Stop reason: HOSPADM

## 2021-01-27 RX ORDER — NALOXONE HYDROCHLORIDE 0.4 MG/ML
0.2 INJECTION, SOLUTION INTRAMUSCULAR; INTRAVENOUS; SUBCUTANEOUS
Status: DISCONTINUED | OUTPATIENT
Start: 2021-01-27 | End: 2021-01-27 | Stop reason: HOSPADM

## 2021-01-27 RX ORDER — FENTANYL CITRATE 50 UG/ML
INJECTION, SOLUTION INTRAMUSCULAR; INTRAVENOUS PRN
Status: DISCONTINUED | OUTPATIENT
Start: 2021-01-27 | End: 2021-01-27

## 2021-01-27 RX ORDER — HYDROMORPHONE HYDROCHLORIDE 1 MG/ML
.3-.5 INJECTION, SOLUTION INTRAMUSCULAR; INTRAVENOUS; SUBCUTANEOUS EVERY 10 MIN PRN
Status: DISCONTINUED | OUTPATIENT
Start: 2021-01-27 | End: 2021-01-27 | Stop reason: HOSPADM

## 2021-01-27 RX ORDER — LIDOCAINE HYDROCHLORIDE 20 MG/ML
INJECTION, SOLUTION INFILTRATION; PERINEURAL PRN
Status: DISCONTINUED | OUTPATIENT
Start: 2021-01-27 | End: 2021-01-27

## 2021-01-27 RX ORDER — DEXAMETHASONE SODIUM PHOSPHATE 4 MG/ML
INJECTION, SOLUTION INTRA-ARTICULAR; INTRALESIONAL; INTRAMUSCULAR; INTRAVENOUS; SOFT TISSUE PRN
Status: DISCONTINUED | OUTPATIENT
Start: 2021-01-27 | End: 2021-01-27

## 2021-01-27 RX ORDER — ONDANSETRON 2 MG/ML
4 INJECTION INTRAMUSCULAR; INTRAVENOUS EVERY 30 MIN PRN
Status: DISCONTINUED | OUTPATIENT
Start: 2021-01-27 | End: 2021-01-27 | Stop reason: HOSPADM

## 2021-01-27 RX ORDER — AMOXICILLIN 250 MG
1 CAPSULE ORAL 2 TIMES DAILY
Qty: 12 TABLET | Refills: 0 | Status: SHIPPED | OUTPATIENT
Start: 2021-01-27 | End: 2021-02-08

## 2021-01-27 RX ORDER — PROPOFOL 10 MG/ML
INJECTION, EMULSION INTRAVENOUS PRN
Status: DISCONTINUED | OUTPATIENT
Start: 2021-01-27 | End: 2021-01-27

## 2021-01-27 RX ORDER — SODIUM CHLORIDE, SODIUM LACTATE, POTASSIUM CHLORIDE, CALCIUM CHLORIDE 600; 310; 30; 20 MG/100ML; MG/100ML; MG/100ML; MG/100ML
INJECTION, SOLUTION INTRAVENOUS CONTINUOUS
Status: DISCONTINUED | OUTPATIENT
Start: 2021-01-27 | End: 2021-01-27 | Stop reason: HOSPADM

## 2021-01-27 RX ORDER — EPHEDRINE SULFATE 50 MG/ML
INJECTION, SOLUTION INTRAMUSCULAR; INTRAVENOUS; SUBCUTANEOUS PRN
Status: DISCONTINUED | OUTPATIENT
Start: 2021-01-27 | End: 2021-01-27

## 2021-01-27 RX ORDER — NEOSTIGMINE METHYLSULFATE 1 MG/ML
VIAL (ML) INJECTION PRN
Status: DISCONTINUED | OUTPATIENT
Start: 2021-01-27 | End: 2021-01-27

## 2021-01-27 RX ORDER — HYDROCODONE BITARTRATE AND ACETAMINOPHEN 5; 325 MG/1; MG/1
1 TABLET ORAL
Status: COMPLETED | OUTPATIENT
Start: 2021-01-27 | End: 2021-01-27

## 2021-01-27 RX ORDER — ONDANSETRON 4 MG/1
4 TABLET, ORALLY DISINTEGRATING ORAL EVERY 30 MIN PRN
Status: DISCONTINUED | OUTPATIENT
Start: 2021-01-27 | End: 2021-01-27 | Stop reason: HOSPADM

## 2021-01-27 RX ORDER — FENTANYL CITRATE 0.05 MG/ML
25-50 INJECTION, SOLUTION INTRAMUSCULAR; INTRAVENOUS
Status: DISCONTINUED | OUTPATIENT
Start: 2021-01-27 | End: 2021-01-27 | Stop reason: HOSPADM

## 2021-01-27 RX ORDER — CEFAZOLIN SODIUM 2 G/100ML
2 INJECTION, SOLUTION INTRAVENOUS
Status: COMPLETED | OUTPATIENT
Start: 2021-01-27 | End: 2021-01-27

## 2021-01-27 RX ORDER — SODIUM CHLORIDE, SODIUM LACTATE, POTASSIUM CHLORIDE, CALCIUM CHLORIDE 600; 310; 30; 20 MG/100ML; MG/100ML; MG/100ML; MG/100ML
INJECTION, SOLUTION INTRAVENOUS CONTINUOUS PRN
Status: DISCONTINUED | OUTPATIENT
Start: 2021-01-27 | End: 2021-01-27

## 2021-01-27 RX ADMIN — GLYCOPYRROLATE 0.8 MG: 0.2 INJECTION, SOLUTION INTRAMUSCULAR; INTRAVENOUS at 09:58

## 2021-01-27 RX ADMIN — Medication 10 MG: at 09:26

## 2021-01-27 RX ADMIN — SODIUM CHLORIDE 1000 ML: 900 IRRIGANT IRRIGATION at 08:52

## 2021-01-27 RX ADMIN — HYDROCODONE BITARTRATE AND ACETAMINOPHEN 1 TABLET: 5; 325 TABLET ORAL at 11:00

## 2021-01-27 RX ADMIN — ACETAMINOPHEN 1000 MG: 500 TABLET, FILM COATED ORAL at 08:58

## 2021-01-27 RX ADMIN — CEFAZOLIN SODIUM 2 G: 2 INJECTION, SOLUTION INTRAVENOUS at 09:19

## 2021-01-27 RX ADMIN — PHENYLEPHRINE HYDROCHLORIDE 100 MCG: 10 INJECTION INTRAVENOUS at 09:26

## 2021-01-27 RX ADMIN — LIDOCAINE HYDROCHLORIDE 100 MG: 20 INJECTION, SOLUTION INFILTRATION; PERINEURAL at 09:14

## 2021-01-27 RX ADMIN — FENTANYL CITRATE 50 MCG: 50 INJECTION, SOLUTION INTRAMUSCULAR; INTRAVENOUS at 09:35

## 2021-01-27 RX ADMIN — FENTANYL CITRATE 50 MCG: 50 INJECTION, SOLUTION INTRAMUSCULAR; INTRAVENOUS at 09:14

## 2021-01-27 RX ADMIN — Medication 5 MG: at 09:59

## 2021-01-27 RX ADMIN — PHENYLEPHRINE HYDROCHLORIDE 100 MCG: 10 INJECTION INTRAVENOUS at 09:59

## 2021-01-27 RX ADMIN — PROPOFOL 150 MG: 10 INJECTION, EMULSION INTRAVENOUS at 09:14

## 2021-01-27 RX ADMIN — ONDANSETRON 4 MG: 2 INJECTION INTRAMUSCULAR; INTRAVENOUS at 09:51

## 2021-01-27 RX ADMIN — NEOSTIGMINE METHYLSULFATE 4 MG: 1 INJECTION, SOLUTION INTRAVENOUS at 09:58

## 2021-01-27 RX ADMIN — DEXAMETHASONE SODIUM PHOSPHATE 4 MG: 4 INJECTION, SOLUTION INTRA-ARTICULAR; INTRALESIONAL; INTRAMUSCULAR; INTRAVENOUS; SOFT TISSUE at 09:26

## 2021-01-27 RX ADMIN — SODIUM CHLORIDE, POTASSIUM CHLORIDE, SODIUM LACTATE AND CALCIUM CHLORIDE: 600; 310; 30; 20 INJECTION, SOLUTION INTRAVENOUS at 09:10

## 2021-01-27 RX ADMIN — ROCURONIUM BROMIDE 30 MG: 10 INJECTION INTRAVENOUS at 09:14

## 2021-01-27 RX ADMIN — BUPIVACAINE HYDROCHLORIDE AND EPINEPHRINE BITARTRATE 30 ML: 5; .005 INJECTION, SOLUTION PERINEURAL at 10:00

## 2021-01-27 ASSESSMENT — ENCOUNTER SYMPTOMS
DYSRHYTHMIAS: 1
ORTHOPNEA: 0
SEIZURES: 0

## 2021-01-27 ASSESSMENT — MIFFLIN-ST. JEOR: SCORE: 1567.71

## 2021-01-27 NOTE — OP NOTE
PREOPERATIVE DIAGNOSIS: Left  inguinal hernia.   POSTOPERATIVE DIAGNOSIS: Left inguinal hernia.   PROCEDURE: Left  inguinal hernia repair with mesh.   SURGEON: Bernard Caballero MD   ASSISTANT: Ayana Pablo PA-C, Physician assistant first assistant was necessary during this procedure due to expertise in patient positioning, prepping, incisional opening, retraction, exposure, and suctioning.  ANESTHESIA: General   ESTIMATED BLOOD LOSS: 2 mL.   DRAINS: None.   COMPLICATIONS: None.   SPECIMENS: None.   OPERATIVE INDICATIONS: Mr. Ram has a symptomatic Left  inguinal hernia. Options were discussed and it was elected to proceed with open repair. Potential risks of bleeding, infection, neurovascular injury to the vas deferens or testicle, recurrent hernia, chronic pain were all reviewed in detail and he wished to proceed.   DESCRIPTION OF PROCEDURE: After informed consent was obtained, the patient was taken to the operating room and placed supine on the operating table. Following the induction of adequate general anesthetic, the patient's Left  groin was shaved, prepped and draped in the usual fashion. A timeout was then completed and IV antibiotics were administered. A standard groin incision was then made and dissection was carried down to the external oblique fascia. This was sharply incised and the inguinal canal was exposed. The spermatic cord and its contents were mobilized and encircled with a Penrose drain. A moderate-sized direct hernia was present. Hernia was reduced and the floor was sutured closed in a Bassini manner using 0 vicryl sutures. Anatomic side-specific Progrip Mesh was then introduced.  The flap to the mesh had been folded back.  The mesh was then placed in the inguinal canal.  The flap was then brought around the spermatic cord and secured into position.  The lateral tail of the mesh was placed up and underneath the fascia of the external oblique.  The spermatic cord was then released and the fascia  of the external oblique was closed using running 0 Vicryl stitch. The operative area was infiltrated with  local anesthetic. The deep subcutaneous was closed with 3-0 Vicryl stitch. Skin incision was closed with subcuticular 4-0 Monocryl stitch. Benzoin and Steri-Strips were applied. Needle and sponge counts were correct. The patient tolerated this well. He was awakened in the operating room and taken to recovery room in stable condition.   KARLOS REDDING MD

## 2021-01-27 NOTE — ANESTHESIA PROCEDURE NOTES
Airway   Date/Time: 1/27/2021 9:17 AM      Staff -   Performed By: CRNA    Indications and Patient Condition  Indications for airway management: airway protection and jim-procedural  Induction type:intravenousMask difficulty assessment: 2 - vent by mask + OA or adjuvant +/- NMBA    Final Airway Details  Final airway type: endotracheal airway  Successful airway:ETT - single  Endotracheal Airway Details   ETT size (mm): 8.0  Grade View of Cords: 1  Adjucts: stylet  Measured from: lips  Secured at (cm): 23  Secured with: pink tape  Bite block used: None    Post intubation assessment   Number of attempts at approach: 1  Number of other approaches attempted: 0  Secured with:pink tape  Ease of procedure: easy  Dentition: Intact and Unchanged

## 2021-01-27 NOTE — BRIEF OP NOTE
[facility]  General Surgery Brief Operative Note    Pre-operative diagnosis: Left inguinal hernia [K40.90]   Post-operative diagnosis Same   Procedure: Procedure(s):  OPEN LEFT INGUINAL HERNIA REPAIR    Surgeon(s), Assistant(s): Surgeon(s) and Role:     * Bernard Caballero MD - Primary     * Ayana Pablo PA-C - Assisting   Estimated blood loss: 2 mL    Drains: None   Specimens: * No specimens in log *   Findings: None.   Complications:  Implants:  Condition: None  Mesh x 1  Stable   Comments:      Ayana Pablo PA-C  [facility]         See dictated operative report for full details

## 2021-01-27 NOTE — DISCHARGE INSTRUCTIONS
Same Day Surgery Discharge Instructions for  Sedation and General Anesthesia       It's not unusual to feel dizzy, light-headed or faint for up to 24 hours after surgery or while taking pain medication.  If you have these symptoms: sit for a few minutes before standing and have someone assist you when you get up to walk or use the bathroom.      You should rest and relax for the next 24 hours. We recommend you make arrangements to have an adult stay with you for at least 24 hours after your discharge.  Avoid hazardous and strenuous activity.      DO NOT DRIVE any vehicle or operate mechanical equipment for 24 hours following the end of your surgery.  Even though you may feel normal, your reactions may be affected by the medication you have received.      Do not drink alcoholic beverages for 24 hours following surgery.       Slowly progress to your regular diet as you feel able. It's not unusual to feel nauseated and/or vomit after receiving anesthesia.  If you develop these symptoms, drink clear liquids (apple juice, ginger ale, broth, 7-up, etc. ) until you feel better.  If your nausea and vomiting persists for 24 hours, please notify your surgeon.        All narcotic pain medications, along with inactivity and anesthesia, can cause constipation. Drinking plenty of liquids and increasing fiber intake will help.      For any questions of a medical nature, call your surgeon.      Do not make important decisions for 24 hours.      If you had general anesthesia, you may have a sore throat for a couple of days related to the breathing tube used during surgery.  You may use Cepacol lozenges to help with this discomfort.  If it worsens or if you develop a fever, contact your surgeon.       If you feel your pain is not well managed with the pain medications prescribed by your surgeon, please contact your surgeon's office to let them know so they can address your concerns.       CoVid 19 Information    We want to give you  information regarding Covid. Please consult your primary care provider with any questions you might have.     Patient who have symptoms (cough, fever, or shortness of breath), need to isolate for 7 days from when symptoms started OR 72 hours after fever resolves (without fever reducing medications) AND improvement of respiratory symptoms (whichever is longer).      Isolate yourself at home (in own room/own bathroom if possible)    Do Not allow any visitors    Do Not go to work or school    Do Not go to Worship,  centers, shopping, or other public places.    Do Not shake hands.    Avoid close and intimate contact with others (hugging, kissing).    Follow CDC recommendations for household cleaning of frequently touched services.     After the initial 7 days, continue to isolate yourself from household members as much as possible. To continue decrease the risk of community spread and exposure, you and any members of your household should limit activities in public for 14 days after starting home isolation.     You can reference the following CDC link for helpful home isolation/care tips:  https://www.cdc.gov/coronavirus/2019-ncov/downloads/10Things.pdf    Protect Others:    Cover Your Mouth and Nose with a mask, disposable tissue or wash cloth to avoid spreading germs to others.    Wash your hands and face frequently with soap and water    Call Your Primary Doctor If: Breathing difficulty develops or you become worse.    For more information about COVID19 and options for caring for yourself at home, please visit the CDC website at https://www.cdc.gov/coronavirus/2019-ncov/about/steps-when-sick.html  For more options for care at [facility], please visit our website at [email or web address]    **Because you had anesthesia today and your history of sleep apnea, it is extremely important that you use your CPAP machine for the next 24 hours while napping or sleeping.**    Today you  were given 1000 mg of Tylenol at 0845. The recommended daily maximum dose is 4000 mg.     [facility]  Discharge Instructions: Post-Operative Open Inguinal Hernia Repair    ACTIVITY    Take frequent, short walks and increase your activity gradually.      Avoid strenuous physical activity or heavy lifting greater than 15 lbs. for 3-4 weeks.  You may climb stairs.    You may drive without restrictions when you are not using any prescription pain medication and feel comfortable in a car.    You may return to work/school when you are comfortable without any prescription pain medication.    WOUND CARE    You may remove your bandage and shower 48 hours after the surgery.  Pat your incisions dry and leave it open to air.  Re-apply dressing (Band-Aids or gauze/tape) as needed for comfort or drainage.    You may have steri-strips (looks like white tape) on your incisions.  You may peel off the steri-strips 2 weeks after your surgery if they have not peeled off on their own.     Do not soak your incisions in a tub or pool for 2 weeks.     Do not apply any lotions, creams, or ointments to your incisions.    A ridge under your incisions is normal and will gradually resolve.    DIET    Start with liquids, then gradually resume your regular diet as tolerated.     Drink plenty of fluids to stay hydrated.    PAIN    Expect some tenderness and discomfort at the incision site(s).  Use the prescribed pain medication at your discretion.  Expect gradual resolution of your pain over several days.    You may take ibuprofen with food (unless you have been told not to) instead of or in addition to your prescribed pain medication.  If you are taking Norco or Percocet, do not take any additional acetaminophen/Tylenol.    Do not drink alcohol or drive while you are taking pain medications.    You may apply ice to your incisions in 20 minute intervals as needed for the next 48 hours.  After that time, consider  switching to heat if you prefer.    EXPECTATIONS    You can expect some swelling and bruising involving the scrotum and/or penis as well as numbness.  These symptoms are expected and should gradually resolve in the next few days.  You may use ice to help with the swelling and try placing a rolled hand towel below your scrotum to help alleviate scrotal discomfort.  If you develop significant testicular or penile pain, please call our office and speak with a nurse.    Pain medications can cause constipation.  Limit use when possible.  Take over the counter stool softener/stimulant, such as Colace or Senna, 1-2 times a day with plenty of water.  You may take a mild over the counter laxative, such as Miralax or a suppository, as needed.  You may discontinue these medications once you are having regular bowel movements and/or are no longer taking your narcotic pain medication.    If you are unable to urinate, or feel as though you are not emptying your bladder adequately, we recommend you call our office and/or seek care at an ER or Urgent Care facility if after hours.    FOLLOW UP    Our office will contact you in approximately 2 weeks to check on your progress and answer any questions you may have.  If you are doing well, you will not need to return for a follow up appointment.  If any concerns are identified over the phone, we will help you make an appointment to see a provider.   o If you have not received a phone call, have any questions or concerns, or would like to be seen, please call us at [phone] and ask to speak with our nurse.  We are located at [address] [place] [address].    CALL OUR OFFICE -[phone] IF YOU HAVE:     Chills or fever above 101 F.    Increased redness, warmth, or drainage at your incisions.    Significant bleeding.    Pain not relieved by your pain medication or rest.    Increasing pain after the first 48 hours.    Any other concerns or questions.            Revised September 2020

## 2021-01-27 NOTE — OR NURSING
Pt to restart blood thinner tomorrow per Morelia JENNINGS- Up to BR voids cl yellow X1- AOX3-VSS-O2 sats >92% RA- Good PO intake, Denies c/o- Daughter Venita LUIS room for discharge instructions- Pt and responsible adult verbalize understanding of discharge instructions, denies questions. Up in W/C - transported to door for discharge to home.

## 2021-01-27 NOTE — ANESTHESIA POSTPROCEDURE EVALUATION
Patient: Zachariah Ram    Procedure(s):  OPEN LEFT INGUINAL HERNIA REPAIR    Diagnosis:Left inguinal hernia [K40.90]  Diagnosis Additional Information: No value filed.    Anesthesia Type:  General    Note:  Disposition: Outpatient   Postop Pain Control: Uneventful            Sign Out: Well controlled pain   PONV: No   Neuro/Psych: Uneventful            Sign Out: Acceptable/Baseline neuro status   Airway/Respiratory: Uneventful            Sign Out: Acceptable/Baseline resp. status   CV/Hemodynamics: Uneventful            Sign Out: Acceptable CV status   Other NRE: NONE   DID A NON-ROUTINE EVENT OCCUR? No         Last vitals:  Vitals:    01/27/21 1140 01/27/21 1200 01/27/21 1220   BP: (!) 147/68 (!) 141/73 (!) 140/68   Pulse:      Resp: 16 16 16   Temp: 36.1  C (96.9  F)     SpO2: 97% 98% 98%       Electronically Signed By: John Ren MD  January 27, 2021  4:03 PM

## 2021-01-27 NOTE — ANESTHESIA CARE TRANSFER NOTE
Patient: Zachariah Ram    Procedure(s):  OPEN LEFT INGUINAL HERNIA REPAIR    Diagnosis: Left inguinal hernia [K40.90]  Diagnosis Additional Information: No value filed.    Anesthesia Type:   General     Note:    Oropharynx: oropharynx clear of all foreign objects  Level of Consciousness: awake  Oxygen Supplementation: face mask    Independent Airway: airway patency satisfactory and stable  Dentition: dentition unchanged  Vital Signs Stable: post-procedure vital signs reviewed and stable  Report to RN Given: handoff report given  Patient transferred to: PACU    Handoff Report: Identifed the Patient, Identified the Reponsible Provider, Reviewed the pertinent medical history, Discussed the surgical course, Reviewed Intra-OP anesthesia mangement and issues during anesthesia, Set expectations for post-procedure period and Allowed opportunity for questions and acknowledgement of understanding      Vitals: (Last set prior to Anesthesia Care Transfer)  CRNA VITALS  1/27/2021 0938 - 1/27/2021 1014      1/27/2021             Pulse:  71    SpO2:  99 %    Resp Rate (set):  10        Electronically Signed By: EDISON Rasheed CRNA  January 27, 2021  10:14 AM

## 2021-01-30 ENCOUNTER — IMMUNIZATION (OUTPATIENT)
Dept: NURSING | Facility: CLINIC | Age: 76
End: 2021-01-30
Payer: MEDICARE

## 2021-01-30 PROCEDURE — 0001A PR COVID VAC PFIZER DIL RECON 30 MCG/0.3 ML IM: CPT

## 2021-01-30 PROCEDURE — 91300 PR COVID VAC PFIZER DIL RECON 30 MCG/0.3 ML IM: CPT

## 2021-02-02 ENCOUNTER — PRE VISIT (OUTPATIENT)
Dept: UROLOGY | Facility: CLINIC | Age: 76
End: 2021-02-02

## 2021-02-03 NOTE — TELEPHONE ENCOUNTER
Reason for visit: ED consult     Relevant information: NA    Records/imaging/labs/orders: referred by [facility]    Pt called: standard

## 2021-02-08 ENCOUNTER — OFFICE VISIT (OUTPATIENT)
Dept: FAMILY MEDICINE | Facility: CLINIC | Age: 76
End: 2021-02-08
Payer: MEDICARE

## 2021-02-08 VITALS
TEMPERATURE: 97.1 F | DIASTOLIC BLOOD PRESSURE: 65 MMHG | BODY MASS INDEX: 27.55 KG/M2 | SYSTOLIC BLOOD PRESSURE: 119 MMHG | OXYGEN SATURATION: 97 % | WEIGHT: 186 LBS | HEART RATE: 55 BPM | HEIGHT: 69 IN

## 2021-02-08 DIAGNOSIS — E78.5 HYPERLIPIDEMIA, UNSPECIFIED HYPERLIPIDEMIA TYPE: ICD-10-CM

## 2021-02-08 DIAGNOSIS — I48.0 PAROXYSMAL ATRIAL FIBRILLATION (H): ICD-10-CM

## 2021-02-08 DIAGNOSIS — K21.9 GASTROESOPHAGEAL REFLUX DISEASE, UNSPECIFIED WHETHER ESOPHAGITIS PRESENT: ICD-10-CM

## 2021-02-08 DIAGNOSIS — I10 ESSENTIAL HYPERTENSION: Primary | ICD-10-CM

## 2021-02-08 DIAGNOSIS — G47.33 OBSTRUCTIVE SLEEP APNEA: ICD-10-CM

## 2021-02-08 PROCEDURE — 99204 OFFICE O/P NEW MOD 45 MIN: CPT | Performed by: INTERNAL MEDICINE

## 2021-02-08 RX ORDER — KETOCONAZOLE 20 MG/ML
SHAMPOO TOPICAL EVERY OTHER DAY
COMMUNITY
Start: 2019-06-05 | End: 2021-08-25

## 2021-02-08 ASSESSMENT — MIFFLIN-ST. JEOR: SCORE: 1569.07

## 2021-02-08 NOTE — PROGRESS NOTES
"  Assessment & Plan   Problem List Items Addressed This Visit        Respiratory    Obstructive sleep apnea       Digestive    GERD (gastroesophageal reflux disease)       Endocrine    Hyperlipidemia       Circulatory    Paroxysmal atrial fibrillation (H)    Hypertension - Primary    Relevant Orders    Home Blood Pressure Monitor Order for DME - ONLY FOR DME         Patient has a history of atrial fibrillation and is status post cardioversion and is on flecainide 100 mg every 12 hours and is chronically anticoagulated on Eliquis 5 mg twice daily.    Patient has rhinitis for which he takes Astelin nasal spray.    Patient has history of seborrheic dermatitis and is using Nizoral shampoo.    Patient has hypothyroidism on 100 mcg of Synthroid.    Patient has a history of erectile dysfunction and using Revatio.    Patient is on losartan 100 mg for hypertension and follows with [facility] cardiology.    Patient has CKD 3   Patient has a history of obstructive sleep apnea and on CPAP therapy.    Patient has history of GERD on PPI therapy and H2 blocker in PM and has a history of Schatzki's ring. Patient has trouble swallowing if he does not chew things well enough.    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 Patient reports he had blue light treatment at derm for skin cancers on face, he is using retinA skin cream as well.    Patient has history of L inguinal hernia repair 1/27/21 .\" Patient reports when he lifts his L leg up straight when stretching he hears a swishing sound. \"    Patient has a history of mitral valve repair.    Patient has had flu and tetanus vaccines.             BMI:   Estimated body mass index is 27.47 kg/m  as calculated from the following:    Height as of this encounter: 1.753 m (5' 9\").    Weight as of this " "encounter: 84.4 kg (186 lb).   Weight management plan: Discussed healthy diet and exercise guidelines    MEDICATIONS:  Continue current medications without change  Work on weight loss  Regular exercise  See Patient Instructions    Return in about 2 months (around 4/8/2021).    Edu Reyes MD  [facility] CAIN Diego is a 75 year old who presents to clinic today for the following health issues   HPI     Problem List  Patient Active Problem List   Diagnosis Code     GERD (gastroesophageal reflux disease)     Essential hypertension     Hypothyroidism     Insomnia     Sleep apnea, obstructive     Atrial fibrillation with rapid ventricular response- recurrent      ACP (advance care planning)     Lentigo maligna melanoma (HC)     Schatzki's ring     Seborrheic dermatitis of scalp     BPH without urinary obstruction     History of skin cancer     Mitral valve prolapse     Abnormality of left atrial appendage     Elevated PSA     Benign essential hypertension     History of cardioversion      H/O mitral valve repair      Chronic anticoagulation     Erectile dysfunction     Nasal congestion       Review of Systems   Constitutional, HEENT, cardiovascular, pulmonary, GI, , musculoskeletal, neuro, skin, endocrine and psych systems are negative, except as otherwise noted.      Objective    /65 (BP Location: Right arm, Patient Position: Chair, Cuff Size: Adult Regular)   Pulse 55   Temp 97.1  F (36.2  C) (Temporal)   Ht 1.753 m (5' 9\")   Wt 84.4 kg (186 lb)   SpO2 97%   BMI 27.47 kg/m    Body mass index is 27.47 kg/m .  Physical Exam   General Appearance: Pleasant, alert, appropriate appearance for age. No acute distress  Head Exam: Normal. Normocephalic, atraumatic.  Eye Exam: Normal external eye, conjunctiva, lids, cornea. CAROLYN.  Fundoscopic Exam: Normal red reflex   Neck Exam: Supple, no masses or nodes.  Chest/Respiratory Exam: Normal chest wall and respirations. Clear to " auscultation.  Cardiovascular Exam: Regular rate and rhythm. S1, S2, no murmur, click, gallop, or rubs.  Gastrointestinal Exam:  Soft, non-tender, no organomegaly.  Musculoskeletal Exam: Back is straight and non-tender, full ROM of upper and lower extremities.  Neurologic Exam: Nonfocal, symmetric DTRs, normal gross motor, tone coordination and no tremor. ftn test normal.  Psychiatric Exam: Alert and oriented - appropriate affect      Office Visit on 01/23/2021   Component Date Value Ref Range Status     COVID-19 Virus PCR to [facility] - So* 01/23/2021 Nasopharyngeal   Final     COVID-19 Virus PCR to [facility] - Re* 01/23/2021 Test received-See reflex to [facility]DL test SARS CoV2 (COVID-19) Virus RT-PCR   Final     SARS-CoV-2 Virus Specimen Source 01/23/2021 Nasopharyngeal   Final     SARS-CoV-2 PCR Result 01/23/2021 NEGATIVE   Final    SARS-CoV2 (COVID-19) RNA not detected, presumed negative.     SARS-CoV-2 PCR Comment 01/23/2021 (Note)   Final    Comment: Testing was performed using the marito SARS-CoV-2 & Influenza A/B Assay on the   marito Margot System.  This test should be ordered for the detection of SARS-COV-2 in individuals who   meet SARS-CoV-2 clinical and/or epidemiological criteria. Test performance is   unknown in asymptomatic patients.  This test is for in vitro diagnostic use under the FDA EUA for laboratories   certified under CLIA to perform moderate and/or high complexity testing. This   test has not been FDA cleared or approved.  A negative test does not rule out the presence of PCR inhibitors in the   specimen or target RNA in concentration below the limit of detection for the   assay. The possibility of a false negative should be considered if the   patient's recent exposure or clinical presentation suggests COVID-19.  [facility] are certified under the Clinical Laboratory   Improvement Amendments of 1988 (CLIA-88) as qualified to perform moderate   and/or high complexity laboratory  testing.         Labs:  Component Name  12/14/2020 3/16/2020 7/8/2019 7/6/2018 9/25/2017 12/10/2012 6/8/2012     139 140 139 138 141 142 141   4.2 4.2 4.2 4.1 4.5 4.1 5.1 (H)   104 106 104 105 108 106 106   28 25 26 27 25 23 25   7 9 9 6 8 13 10   88 91 88 84 89 97 78   8.6 8.4 (L) 9.0 9.0 8.5 8.9 9.1   15 19 16 17 16 18 20   1.10 1.22 1.16 1.10 1.09 1.06 1.07   14 16 14 15 15 17 19   >60 >60 >60 >60 >60 >60 >60   >60 58 (L) >60 >60 >60       165 183 203 (H)   88 90 81   56 54 58   109 129 145 (H)   2.95 3.39 3.50   91 111 129   FASTING RANDOM RANDOM        12/14/2020 7/8/2019 7/6/2018 9/25/2017 12/10/2012     6.1 6.1 6.5 5.4 6.1 7.2   4.50 4.48 4.23 (L) 4.66 4.48 4.72   14.0 13.9 13.3 (L) 14.7 13.9 14.4   40.9 41.2 38.0 41.6 39.8 41.8   91 92 90 89 89 89   31.1 31.0 31.4 31.5 31.0 30.5   34.2 33.7 35.0 35.3 34.9 34.4   12.6 13.1 13.1 13.1 12.9 13.2   230 218 212 231 213 230   10.0 10.1 10.1 10.1 10.1 10.4   56.0 56.5 58.6 55.0 57.1 54.3   31.2 28.4 26.4 28.9 27.6 30.2   10.1 11.9 9.1 12.9 12.0 (H) 11.6 (H)   2.4 2.9 5.4 2.8 3.0 3.6   0.3 0.3 0.5 0.4 0.3 0.3   3.4 3.5 3.8 3.0 3.5 3.9   1.9 1.7 1.7 1.6 1.7 2.2   0.6 0.7 0.6 0.7 0.7 0.8   0.2 0.2 0.4 0.2 0.2 0.3   0.0 0.0 0.0 0.0 0.0 0.0

## 2021-02-10 ENCOUNTER — PRE VISIT (OUTPATIENT)
Dept: UROLOGY | Facility: CLINIC | Age: 76
End: 2021-02-10

## 2021-02-10 ENCOUNTER — VIRTUAL VISIT (OUTPATIENT)
Dept: UROLOGY | Facility: CLINIC | Age: 76
End: 2021-02-10
Payer: MEDICARE

## 2021-02-10 DIAGNOSIS — N52.9 ORGANIC ERECTILE DYSFUNCTION: Primary | ICD-10-CM

## 2021-02-10 PROCEDURE — 99204 OFFICE O/P NEW MOD 45 MIN: CPT | Mod: 95 | Performed by: UROLOGY

## 2021-02-10 NOTE — LETTER
"2/10/2021       RE: Zachariah Ram  [address]  [address]  [facility] [address]     Dear Colleague,    Thank you for referring your patient, Zachariah Ram, to the [facility] [place] at [facility]. Please see a copy of my visit note below.    Johnathon is a 75 year old who is being evaluated via a billable video visit.      Video Visit Technology for this patient: David Video Visit- Patient was left in waiting room      How would you like to obtain your AVS? MyChart  If the video visit is dropped, the invitation should be resent by: Send to e-mail at: [email or web address]  Will anyone else be joining your video visit? No      Video Start Time: 1230p  Video-Visit Details    Type of service:  Video Visit    Video End Time: 1:00 PM    Originating Location (pt. Location): Home    Distant Location (provider location):  [facility] [place]     Platform used for Video Visit: David        Name: Zachariah Ram    MRN: [id number]   YOB: 1945                 Chief Complaint:   Erectile dysfunction          History of Present Illness:   Mr. Zachariah Ram is a 75 year old male seen with ED   52 years  Had prostate cancer s/p RALP by Dr. Cornelius.  Found on MRI with \"90%\" rate of cancer.  Then biopsy proven.  Underwent prostatectomy at [place]. Supposed to be in March 2020.   Then Lupron+Casodex  Ended up having prostatectomy in May 2020. [place] 4+3=7. All margins/nodes negative.  Recently had open L inguinal hernia repair  In terms of PSA - his PSA in undetectable.  He wears a protective pad only, but it is not wet everyday.  He can get a mediocre erections with sildenafil but inadequate for erections. He's maxed at 40mg. He starts getting side effects at this dose - especially nasal congestion.  Has a family member who used vacuum device without much benefit.         Past Medical History:     Past Medical History: "   Diagnosis Date     Antiplatelet or antithrombotic long-term use      Arrhythmia     A FIB     Atrial flutter (H)      BPH (benign prostatic hyperplasia)      ED (erectile dysfunction)      Elevated prostate specific antigen (PSA)      GERD (gastroesophageal reflux disease)      Heart murmur     MVP     History of skin cancer      Hyperlipidemia      Hypertension      Insomnia      Left inguinal hernia      Lentigo maligna melanoma (H)      MVP (mitral valve prolapse)      Nasal congestion      Obstructive sleep apnea     SEVERE    -  USES CPAP     Paroxysmal atrial fibrillation (H)      Schatzki's ring      Seborrheic dermatitis      Smoking history     quit 1973     Urinary frequency             Past Surgical History:     Past Surgical History:   Procedure Laterality Date     CARDIOVERSION  08/07/2017     COLONOSCOPY  2015     at [facility] ([place], [place], [place])     EGD, GASTROESOPHAGEAL REFLUX TEST WITH MUCOSAL PH ELECTRODE       HC KNEE SCOPE,MED/LAT MENISCUS REPAIR Left 2016     HERNIA REPAIR  1994     HERNIORRHAPHY INGUINAL Left 1/27/2021    Procedure: OPEN LEFT INGUINAL HERNIA REPAIR;  Surgeon: Bernard Caballero MD;  Location: [facility] OR     LASIK       Left atrial appendage ligation  1996     MR PROSTATE  WO & W CONTRAST  2016, 2018     PAROTIDECTOMY  2004    partial parotidectomy     REPAIR VALVE MITRAL  1996     SKIN CANCER SCREENING      Skin cancer surgeries-- basal on ear 2008, melanoma on left sideburn 2011, carcinoma right arm 2013     THYROIDECTOMY  2008    partial thyroidectomy            Social History:     Social History     Tobacco Use     Smoking status: Never Smoker     Smokeless tobacco: Never Used   Substance Use Topics     Alcohol use: Yes     Comment: occassional            Family History:     Family History   Problem Relation Age of Onset     Coronary Artery Disease Early Onset Father               Allergies:     Allergies   Allergen Reactions     Amlodipine Swelling      Noted at 5 mg     Extract Of Poison Ivy      Other Food Allergy      Oats            Medications:     Current Outpatient Medications   Medication Sig     amoxicillin (AMOXIL) 500 MG capsule Take 500 mg by mouth Prior to dentist 2000mg     apixaban ANTICOAGULANT (ELIQUIS) 5 MG tablet Take by mouth 2 times daily      Ascorbic Acid (VITAMIN C) 500 MG CAPS Take 500 mg by mouth daily 500 mg in Am and 1,000 mg in PM     AZELASTINE HCL .5 mcg      calcium citrate-vitamin D (CITRACAL) 315-200 MG-UNIT TABS per tablet 2 tablets 2 times daily      Cholecalciferol (VITAMIN D3) 1000 units CAPS Take 1,000 mg by mouth daily     famotidine (PEPCID) 10 MG tablet Take 20 mg by mouth daily     flecainide (TAMBOCOR) 100 MG tablet Take 100 mg by mouth 2 times daily      Glucosamine Sulfate 500 MG TABS Take 1 tablet by mouth daily     ketoconazole (NIZORAL) 2 % cream Apply topically daily as needed for itching     ketoconazole (NIZORAL) 2 % external shampoo      levothyroxine (LEVOXYL) 100 MCG tablet Take 100 mcg by mouth daily      losartan (COZAAR) 50 MG tablet Take 2 tablets (100 mg) by mouth At Bedtime     magnesium 200 MG TABS Take 250 mg by mouth daily     Misc Natural Products (GLUCOSAMINE CHOND MSM FORMULA PO) Take by mouth daily     Multiple Vitamins-Minerals (VITRUM 50+ SENIOR MULTI) TABS Take by mouth daily     nystatin (MYCOSTATIN) 519991 UNIT/GM external ointment Apply topically 2 times daily     Potassium Gluconate 595 MG CAPS Take 595 mg by mouth daily     sildenafil (REVATIO) 20 MG tablet Take 3 tablets (20 mg) by mouth daily as needed for erectile dysfunction     No current facility-administered medications for this visit.              Review of Systems:    ROS: 14 point ROS neg other than the symptoms noted above in the HPI and PMH.          Physical Exam:   B/P: Data Unavailable, T: Data Unavailable, P: Data Unavailable, R: Data Unavailable  Estimated body mass index is 27.47 kg/m  as calculated from the  "following:    Height as of 2/8/21: 1.753 m (5' 9\").    Weight as of 2/8/21: 84.4 kg (186 lb).  Exam:           Constitutional - No apparent distress. Patient of stated age.               Eyes - no redness or discharge.              Respiratory - no cough. no labored breathing              Musculoskeletal - full range of motion in all extremities              Skin - no visible skin discoloration or lesions              Neurological - no tremors              Psychiatric - no anxiety, alert & oriented                 The rest of a comprehensive physical examination is deferred due to PHE (public health emergency) video visit restrictions.      Labs:    All laboratory data reviewed with patient  Significant for PSA undetectable. Cr 1.16      Outside records:    I spent 10 minutes reviewing outside records.           Assessment and Plan:   75 year old male with ED and prior prostatectomy for prostate cancer    Discussed PDE5, ICI and IPP and vacuum devices.    We discussed the risks and benefits of these approaches at length. Patient and wife had numerous questions today and I answered them to the best of my abilities.  He does take blood thinners, but he notes he does not bleed excessively.  He would like to try injection therapy.  Will Rx Trimix and schedule for teaching session.  I discussed he could also look at the IPP devices during that visit.    Tommy Sanchez MD  February 10, 2021       "

## 2021-02-10 NOTE — PROGRESS NOTES
"Johnathon is a 75 year old who is being evaluated via a billable video visit.      Video Visit Technology for this patient: AntonWell Video Visit- Patient was left in waiting room      How would you like to obtain your AVS? MyChart  If the video visit is dropped, the invitation should be resent by: Send to e-mail at: [email or web address]  Will anyone else be joining your video visit? No      Video Start Time: 1230p  Video-Visit Details    Type of service:  Video Visit    Video End Time: 1:00 PM    Originating Location (pt. Location): Home    Distant Location (provider location):  [facility] [place]     Platform used for Video Visit: Adapteva        Name: Zachariah Ram    MRN: [id number]   YOB: 1945                 Chief Complaint:   Erectile dysfunction          History of Present Illness:   Mr. Zachariah Ram is a 75 year old male seen with ED   52 years  Had prostate cancer s/p RALP by Dr. Cornelius.  Found on MRI with \"90%\" rate of cancer.  Then biopsy proven.  Underwent prostatectomy at [place]. Supposed to be in March 2020.   Then Lupron+Casodex  Ended up having prostatectomy in May 2020. [place] 4+3=7. All margins/nodes negative.  Recently had open L inguinal hernia repair  In terms of PSA - his PSA in undetectable.  He wears a protective pad only, but it is not wet everyday.  He can get a mediocre erections with sildenafil but inadequate for erections. He's maxed at 40mg. He starts getting side effects at this dose - especially nasal congestion.  Has a family member who used vacuum device without much benefit.         Past Medical History:     Past Medical History:   Diagnosis Date     Antiplatelet or antithrombotic long-term use      Arrhythmia     A FIB     Atrial flutter (H)      BPH (benign prostatic hyperplasia)      ED (erectile dysfunction)      Elevated prostate specific antigen (PSA)      GERD (gastroesophageal reflux disease)      Heart murmur     MVP     History of " skin cancer      Hyperlipidemia      Hypertension      Insomnia      Left inguinal hernia      Lentigo maligna melanoma (H)      MVP (mitral valve prolapse)      Nasal congestion      Obstructive sleep apnea     SEVERE    -  USES CPAP     Paroxysmal atrial fibrillation (H)      Schatzki's ring      Seborrheic dermatitis      Smoking history     quit 1973     Urinary frequency             Past Surgical History:     Past Surgical History:   Procedure Laterality Date     CARDIOVERSION  08/07/2017     COLONOSCOPY  2015     at [facility] ([place], [place], [place])     EGD, GASTROESOPHAGEAL REFLUX TEST WITH MUCOSAL PH ELECTRODE       HC KNEE SCOPE,MED/LAT MENISCUS REPAIR Left 2016     HERNIA REPAIR  1994     HERNIORRHAPHY INGUINAL Left 1/27/2021    Procedure: OPEN LEFT INGUINAL HERNIA REPAIR;  Surgeon: Bernard Caballero MD;  Location: [facility] OR     LASIK       Left atrial appendage ligation  1996     MR PROSTATE  WO & W CONTRAST  2016, 2018     PAROTIDECTOMY  2004    partial parotidectomy     REPAIR VALVE MITRAL  1996     SKIN CANCER SCREENING      Skin cancer surgeries-- basal on ear 2008, melanoma on left sideburn 2011, carcinoma right arm 2013     THYROIDECTOMY  2008    partial thyroidectomy            Social History:     Social History     Tobacco Use     Smoking status: Never Smoker     Smokeless tobacco: Never Used   Substance Use Topics     Alcohol use: Yes     Comment: occassional            Family History:     Family History   Problem Relation Age of Onset     Coronary Artery Disease Early Onset Father               Allergies:     Allergies   Allergen Reactions     Amlodipine Swelling     Noted at 5 mg     Extract Of Poison Ivy      Other Food Allergy      Oats            Medications:     Current Outpatient Medications   Medication Sig     amoxicillin (AMOXIL) 500 MG capsule Take 500 mg by mouth Prior to dentist 2000mg     apixaban ANTICOAGULANT (ELIQUIS) 5 MG tablet Take by mouth 2 times daily   "    Ascorbic Acid (VITAMIN C) 500 MG CAPS Take 500 mg by mouth daily 500 mg in Am and 1,000 mg in PM     AZELASTINE HCL .5 mcg      calcium citrate-vitamin D (CITRACAL) 315-200 MG-UNIT TABS per tablet 2 tablets 2 times daily      Cholecalciferol (VITAMIN D3) 1000 units CAPS Take 1,000 mg by mouth daily     famotidine (PEPCID) 10 MG tablet Take 20 mg by mouth daily     flecainide (TAMBOCOR) 100 MG tablet Take 100 mg by mouth 2 times daily      Glucosamine Sulfate 500 MG TABS Take 1 tablet by mouth daily     ketoconazole (NIZORAL) 2 % cream Apply topically daily as needed for itching     ketoconazole (NIZORAL) 2 % external shampoo      levothyroxine (LEVOXYL) 100 MCG tablet Take 100 mcg by mouth daily      losartan (COZAAR) 50 MG tablet Take 2 tablets (100 mg) by mouth At Bedtime     magnesium 200 MG TABS Take 250 mg by mouth daily     Misc Natural Products (GLUCOSAMINE CHOND MSM FORMULA PO) Take by mouth daily     Multiple Vitamins-Minerals (VITRUM 50+ SENIOR MULTI) TABS Take by mouth daily     nystatin (MYCOSTATIN) 105820 UNIT/GM external ointment Apply topically 2 times daily     Potassium Gluconate 595 MG CAPS Take 595 mg by mouth daily     sildenafil (REVATIO) 20 MG tablet Take 3 tablets (20 mg) by mouth daily as needed for erectile dysfunction     No current facility-administered medications for this visit.              Review of Systems:    ROS: 14 point ROS neg other than the symptoms noted above in the HPI and PMH.          Physical Exam:   B/P: Data Unavailable, T: Data Unavailable, P: Data Unavailable, R: Data Unavailable  Estimated body mass index is 27.47 kg/m  as calculated from the following:    Height as of 2/8/21: 1.753 m (5' 9\").    Weight as of 2/8/21: 84.4 kg (186 lb).  Exam:           Constitutional - No apparent distress. Patient of stated age.               Eyes - no redness or discharge.              Respiratory - no cough. no labored breathing              Musculoskeletal - full range of " motion in all extremities              Skin - no visible skin discoloration or lesions              Neurological - no tremors              Psychiatric - no anxiety, alert & oriented                 The rest of a comprehensive physical examination is deferred due to PHE (public health emergency) video visit restrictions.      Labs:    All laboratory data reviewed with patient  Significant for PSA undetectable. Cr 1.16      Outside records:    I spent 10 minutes reviewing outside records.           Assessment and Plan:   75 year old male with ED and prior prostatectomy for prostate cancer    Discussed PDE5, ICI and IPP and vacuum devices.    We discussed the risks and benefits of these approaches at length. Patient and wife had numerous questions today and I answered them to the best of my abilities.  He does take blood thinners, but he notes he does not bleed excessively.  He would like to try injection therapy.  Will Rx Trimix and schedule for teaching session.  I discussed he could also look at the IPP devices during that visit.    Tommy Sanchez MD  February 10, 2021

## 2021-02-12 ENCOUNTER — TELEPHONE (OUTPATIENT)
Dept: SURGERY | Facility: CLINIC | Age: 76
End: 2021-02-12

## 2021-02-12 NOTE — TELEPHONE ENCOUNTER
[facility]  Post op call note     Zachariah Ram was called for an update regarding his recovery.  He underwent  an open left inguinal hernia repair by Dr. Caballero on 1/27/21.  Today he tells me he is doing well and denies any complaints.  He states his wounds are healing well. He is eating a normal diet and his bowels are regular.  He was instructed to remove steri strips, continue to keep his wounds clean and dry, and gradually resume any strenuous activity or exercise program. The patient states he understands our discussion and all of his questions were answered.  The patient agrees to follow up in clinic as needed.      Ayana Pablo PA-C

## 2021-02-13 ENCOUNTER — MYC MEDICAL ADVICE (OUTPATIENT)
Dept: FAMILY MEDICINE | Facility: CLINIC | Age: 76
End: 2021-02-13

## 2021-02-13 DIAGNOSIS — Z12.5 SCREENING FOR PROSTATE CANCER: Primary | ICD-10-CM

## 2021-02-14 NOTE — TELEPHONE ENCOUNTER
Jad Diego, I encourage you to meet with Medical Therapy Management (MTM) and they can forward to me their assessment and any need of change of medications, also MTM can address the sleep issues or concerns you have and share with me their assessment , so we can definitely help you with your concerns.  Hope this clarifies  Please I will need that you send me Blood pressure readings so I can be able to address better the Blood pressure medicines. Make sure you are rested for 10 minutes before you check your blood pressure  Thank you for follow up message  Dr Reyes

## 2021-02-18 NOTE — TELEPHONE ENCOUNTER
See below update from patient     Pt also asking if you'll order a PSA lab test for him?    Next 5 appointments (look out 90 days)    Mar 01, 2021  8:00 AM  Office Visit with Melia Jaime [facility] ([facility] ) [address] [place] [id number]  [phone]   Apr 09, 2021 10:30 AM  Office Visit with Edu Reyes MD  [facility] ([facility] ) [address] [place] [id number] [phone]        Please advise   Leslye HERRMANN RN

## 2021-02-20 ENCOUNTER — IMMUNIZATION (OUTPATIENT)
Dept: NURSING | Facility: CLINIC | Age: 76
End: 2021-02-20
Attending: PEDIATRICS
Payer: MEDICARE

## 2021-02-20 PROCEDURE — 91300 PR COVID VAC PFIZER DIL RECON 30 MCG/0.3 ML IM: CPT

## 2021-02-20 PROCEDURE — 0002A PR COVID VAC PFIZER DIL RECON 30 MCG/0.3 ML IM: CPT

## 2021-03-01 ENCOUNTER — OFFICE VISIT (OUTPATIENT)
Dept: PHARMACY | Facility: CLINIC | Age: 76
End: 2021-03-01

## 2021-03-01 VITALS — DIASTOLIC BLOOD PRESSURE: 73 MMHG | HEART RATE: 82 BPM | SYSTOLIC BLOOD PRESSURE: 124 MMHG

## 2021-03-01 DIAGNOSIS — Z90.79 H/O PROSTATECTOMY: ICD-10-CM

## 2021-03-01 DIAGNOSIS — Z78.9 TAKES DIETARY SUPPLEMENTS: ICD-10-CM

## 2021-03-01 DIAGNOSIS — I10 ESSENTIAL HYPERTENSION: Primary | ICD-10-CM

## 2021-03-01 DIAGNOSIS — E03.9 HYPOTHYROIDISM, UNSPECIFIED TYPE: ICD-10-CM

## 2021-03-01 DIAGNOSIS — L21.9 SEBORRHEIC DERMATITIS: ICD-10-CM

## 2021-03-01 DIAGNOSIS — I48.0 PAROXYSMAL ATRIAL FIBRILLATION (H): ICD-10-CM

## 2021-03-01 DIAGNOSIS — K21.9 GASTROESOPHAGEAL REFLUX DISEASE, UNSPECIFIED WHETHER ESOPHAGITIS PRESENT: ICD-10-CM

## 2021-03-01 DIAGNOSIS — G47.00 INSOMNIA, UNSPECIFIED TYPE: ICD-10-CM

## 2021-03-01 DIAGNOSIS — N52.9 ERECTILE DYSFUNCTION, UNSPECIFIED ERECTILE DYSFUNCTION TYPE: ICD-10-CM

## 2021-03-01 PROCEDURE — 99605 MTMS BY PHARM NP 15 MIN: CPT | Performed by: PHARMACIST

## 2021-03-01 PROCEDURE — 99607 MTMS BY PHARM ADDL 15 MIN: CPT | Performed by: PHARMACIST

## 2021-03-01 RX ORDER — TRETINOIN 0.5 MG/G
CREAM TOPICAL AT BEDTIME
COMMUNITY

## 2021-03-01 RX ORDER — DIAPER,BRIEF,INFANT-TODD,DISP
EACH MISCELLANEOUS DAILY PRN
COMMUNITY

## 2021-03-01 NOTE — PROGRESS NOTES
Medication Therapy Management (MTM) Encounter    ASSESSMENT:                            Medication Adherence/Access: Discussed the potential for medication errors with mixing all his medications in 1 bottle. Recommended he switch to pill boxes. He says he likes how he does it and does not make mistakes with his pills.     Hypertension/a-fib: Stable. His home cuff seems to be 5-10 mmHg higher than the clinic monitor. Given his readings on the clinic cuff are meeting goal of <130/80 mmHg, will not add hydrochlorothiazide at this time.     Insomnia: Needs improvement. Nocturia is likely a major factor in his insomnia, so would prefer he talk with his urologist about this prior to considering medications for insomnia.     Hypothyroidism: Stable.     S/p prostatectomy/ED: Plan in place to follow up with urology regarding this.     GERD: Stable.     Seborrheic dermatitis: Stable.    Supplements: He is getting about 1200 mg of calcium per day from his diet, therefore does not require additional supplementation. Discussed that if he hasn't noticed benefit from glucosamine he could stop it, but he prefers to keep taking it. Also discussed that he is likely getting enough magnesium and potassium from his diet, but he prefers to keep taking the supplements.     PLAN:                            1. Stop taking your calcium supplements. You are getting plenty of calcium from your diet. Start taking separate vitamin D 1000 units daily when you stop taking the calcium tablets.     2. Talk to your urologist about your frequent urination at night. This is probably contributing and/or causing your issues with sleep.     Follow-up: 3 months    SUBJECTIVE/OBJECTIVE:                          Zachariah Ram is a 75 year old male coming in for an initial visit. He was referred to me from Dr. Reyes.      Reason for visit: Comprehensive medication review. Patient has concerns about hypertension and insomnia today.     Allergies/ADRs:  "Reviewed in chart  Tobacco: He reports that he has never smoked. He has never used smokeless tobacco.  Alcohol: 1-3 beverages / week  Caffeine: 0-1 cups/day of coffee  Activity: Walks an hour 5 days/wk (outside or on the treadmill), stretching for 15 mins 4-5 days per week    Medication Adherence/Access: He does not use pill boxes, but he mixes all his morning medications for the whole month into 1 bottle and then grabs the correct pills out each day. He does this for his before breakfast, with breakfast, and bedtime medications. Patient takes medications 3 time(s) per day. Per patient, misses medication 0 times per week.     Hypertension/a-fib: Patient had mitral valve repair in 1996, and reports he has had issues with a-fib since that surgery. Current medications include apixaban 5 mg BID, flecainide 100 mg BID, losartan 100 mg daily. Patient reports no current medication side effects, and he says flecainide is his \"wonder drug\". Patient had been on metoprolol previously but reports bradycardia, also tried amlodipine but had edema. He tolerated amlodipine for a while, then had issues after a couple years. Most recently he was on lisinopril but was switched to losartan due to cough. Patient had visit with Sera Gayle PA-C, on 10/9/20 and has been in regular communication with her over [facility] regarding his blood pressure. She had considered adding hydrochlorothiazide. His cardiologist is Dr. Toure.     Patient monitors his BP at home with an Omron arm cuff and brings a printout of his readings. He notes he checks it when he is sitting at the computer working.    Systolic: 122-147 mmHg, average 135 mmHg  Diastolic: 73-85 mmHg, average 76 mmHg  Pulse: 48-58 bpm    Today's readings:  Clinic cuff:  130/77 mmHg, 118/69 mmHg, 124/73 mmHg  His home cuff: 138/71 mmHg, 138/71 mmHg    BP Readings from Last 3 Encounters:   02/08/21 119/65   01/27/21 (!) 140/68   01/14/21 138/60     Insomnia: Patient is not currently " "taking anything. He has sleep apnea and uses his CPAP every night \"religiously\". He is able to fall asleep easily at 10pm. He wakes up every 2-3 hours to use the bathroom. After the second time he wakes up, he has trouble getting back to sleep. He averages 5-6 hours of sleep per night according to his CPAP young monitor. He says he has always been this way, and isnt sure if he's supposed to be getting more sleep. He has not talked with urologist Dr. Sanchez or Dr. Reyes about his nocturia. He used to take something for sleep many years ago (possibly zolpidem) then his doctor stopped it. Then he took melatonin, but didn't find that helpful.     Hypothyroidism: Patient is taking levothyroxine 100 mcg daily. He takes this 30 minutes before breakfast. Patient is having the following symptoms: none. TSH per care everywhere 2.14 uIU/mL (12/14/20).     S/p prostatectomy/ED: Patient underwent prostatectomy in May 2020 due to prostate cancer. Since then he has struggled with erectile dysfunction. He has prescription for sildenafil and he typically uses 20-40 mg. He finds this works well, but he notices nasal congestion which is very bothersome. He denies other side effects (but says it helps his blood pressure). Patient uses azelastine nasal spray for the nasal congestion only when he takes the sildenafil. His urologist suggested to switch to Cialis, and he has also been talking with his urologist about self-injection therapy.     GERD: Patient takes famotidine 20 mg daily and reports this works well with no side effects. He notices if he doesn't take it he starts having symptoms.     Dermatitis: Patient uses ketoconazole cream (on and off) and shampoo (every other day). He finds this helpful. He also occasionally uses cortisone cream OTC (recommended by his dermatologist).  He says sometimes the ketoconazole shampoo dries his skin slightly but note no other side effects. He also reports his dermatology NP, Chasidy Gamboa, " "prescribed tretinoin cream for precancerous spots on his face. He says he is always having to have spots removed, and he has found this helpful. He uses this daily.    Supplements: Patient takes vitamin C, calcium with vitamin D, glucosamide chondroitin, daily MVI, magnesium, and potassium. He says the potassium and magnesium was recommended by his osteopath in the past (over 15 years ago). He gets 3-4 servings of dairy per day. He says he had a DEXA about 2 years ago that showed osteopenia (he says it was borderline). He says he has never really had a problem with joints but a friend recommended glucosamine (or he says he maybe \"fell for the advertising\"). He isnt sure if the glucosamine is making a difference.     Lab results per care everywhere:     B12 (1/15/21): 507 pg/mL    Potassium (12/14/20): 4.2 mmol/L    Today's Vitals: /73   Pulse 82   ----------------    I spent 60 minutes with this patient today. All changes were made via collaborative practice agreement with Dr. Reyes. A copy of the visit note was provided to the patient's primary care provider.    The patient was given a summary of these recommendations.     Valentina Bassett, PharmD  [facility] Resident    The patient was seen independently by Dr. Bassett.  I have read the note and agree with the assessment and plan.  Melia Jaime, Jim, [facility]  Medication Therapy Management Provider  Pager: [phone]        Medication Therapy Recommendations  Takes dietary supplements    Current Medication: calcium citrate-vitamin D (CITRACAL) 315-200 MG-UNIT TABS per tablet   Rationale: No medical indication at this time - Unnecessary medication therapy - Indication   Recommendation: Discontinue Medication - he is getting 1200 mg from diet   Status: Accepted - no CPA Needed               "

## 2021-03-01 NOTE — PATIENT INSTRUCTIONS
Recommendations from today's MTM visit:                                                       1. Stop taking your calcium supplements. You are getting plenty of calcium from your diet. Start taking vitamin D 1000 units daily.     2. Talk to your urologist about your frequent urination at night.     It was great to speak with you today.  I value your experience and would be very thankful for your time with providing feedback on our clinic survey. You may receive a survey via email or text message in the next few days.     Next MTM visit: 3 months    To schedule another MT appointment, please call the clinic directly or you may call the MT scheduling line at [phone] or toll-free at [phone].     My Clinical Pharmacist's contact information:                                                      It was a pleasure talking with you today!  Please feel free to contact me with any questions or concerns you have.      Valentina Bassett, PharmD  [facility] Resident  Pager: [phone]

## 2021-03-08 ENCOUNTER — TELEPHONE (OUTPATIENT)
Dept: UROLOGY | Facility: CLINIC | Age: 76
End: 2021-03-08

## 2021-03-08 NOTE — TELEPHONE ENCOUNTER
Left message for pt to call and schedule a nurse visit for 3/11/21 at 11:30AM for injection teaching. (Per Max)

## 2021-03-08 NOTE — TELEPHONE ENCOUNTER
----- Message from Rema Shaffer RN sent at 3/8/2021  7:41 AM CST -----  Regarding: RE: [employer]  Please call this patient to schedule an appointment for nurse visit on Thursday 3/11/21 @ 1130 am  Please call patient. He is coming for injection teaching  Rema   ----- Message -----  From: Rema Shaffer RN  Sent: 3/8/2021  To: Rema Shaffer RN  Subject: FW: [employer]                                         ----- Message -----  From: Rema Shaffer RN  Sent: 2/15/2021  To: Rema Shaffer RN  Subject: FW: [employer]                                         ----- Message -----  From: Mikhail Villalta, EMT  Sent: 2/12/2021   8:32 AM CST  To: Rema Shaffer RN  Subject: Trimdee Brooke,     Dr. Sanchez stated in his last note that he would like this patient to be seen for a nurse visit injection teaching. Please let me know if there's anything else I can help with.     Thanks,  Mikhail

## 2021-03-16 DIAGNOSIS — Z20.822 ENCOUNTER FOR LABORATORY TESTING FOR COVID-19 VIRUS: Primary | ICD-10-CM

## 2021-03-17 ENCOUNTER — ALLIED HEALTH/NURSE VISIT (OUTPATIENT)
Dept: NURSING | Facility: CLINIC | Age: 76
End: 2021-03-17
Payer: MEDICARE

## 2021-03-17 VITALS — HEART RATE: 52 BPM | SYSTOLIC BLOOD PRESSURE: 129 MMHG | DIASTOLIC BLOOD PRESSURE: 69 MMHG

## 2021-03-17 DIAGNOSIS — Z12.5 SCREENING FOR PROSTATE CANCER: ICD-10-CM

## 2021-03-17 DIAGNOSIS — I10 ESSENTIAL HYPERTENSION: Primary | ICD-10-CM

## 2021-03-17 PROCEDURE — 99207 PR NO CHARGE NURSE ONLY: CPT

## 2021-03-17 PROCEDURE — G0103 PSA SCREENING: HCPCS | Performed by: INTERNAL MEDICINE

## 2021-03-17 PROCEDURE — 36415 COLL VENOUS BLD VENIPUNCTURE: CPT | Performed by: INTERNAL MEDICINE

## 2021-03-17 NOTE — Clinical Note
Zachariah Ram is a 75 year old patient who comes in today for a Blood Pressure check.  Initial BP:  /69 (BP Location: Left arm, Cuff Size: Adult Large)   Pulse 52      52  Disposition: follow-up as previously indicated by provider and results routed to provider    Radha Knott MA

## 2021-03-18 LAB — PSA SERPL-ACNC: <0.01 UG/L (ref 0–4)

## 2021-04-09 ENCOUNTER — OFFICE VISIT (OUTPATIENT)
Dept: FAMILY MEDICINE | Facility: CLINIC | Age: 76
End: 2021-04-09
Payer: MEDICARE

## 2021-04-09 VITALS
SYSTOLIC BLOOD PRESSURE: 116 MMHG | WEIGHT: 184 LBS | BODY MASS INDEX: 27.17 KG/M2 | TEMPERATURE: 97 F | OXYGEN SATURATION: 96 % | HEART RATE: 59 BPM | DIASTOLIC BLOOD PRESSURE: 70 MMHG

## 2021-04-09 DIAGNOSIS — K21.9 GASTROESOPHAGEAL REFLUX DISEASE, UNSPECIFIED WHETHER ESOPHAGITIS PRESENT: ICD-10-CM

## 2021-04-09 DIAGNOSIS — N52.9 ERECTILE DYSFUNCTION, UNSPECIFIED ERECTILE DYSFUNCTION TYPE: Primary | ICD-10-CM

## 2021-04-09 DIAGNOSIS — E78.5 HYPERLIPIDEMIA, UNSPECIFIED HYPERLIPIDEMIA TYPE: ICD-10-CM

## 2021-04-09 DIAGNOSIS — I48.0 PAROXYSMAL ATRIAL FIBRILLATION (H): ICD-10-CM

## 2021-04-09 DIAGNOSIS — I10 ESSENTIAL HYPERTENSION: ICD-10-CM

## 2021-04-09 DIAGNOSIS — G47.33 OBSTRUCTIVE SLEEP APNEA: ICD-10-CM

## 2021-04-09 PROCEDURE — 99214 OFFICE O/P EST MOD 30 MIN: CPT | Performed by: INTERNAL MEDICINE

## 2021-04-09 RX ORDER — TADALAFIL 5 MG/1
5 TABLET ORAL DAILY PRN
Qty: 30 TABLET | Refills: 0 | Status: SHIPPED | OUTPATIENT
Start: 2021-04-09 | End: 2021-05-03

## 2021-04-09 NOTE — PROGRESS NOTES
Assessment & Plan   Problem List Items Addressed This Visit        Respiratory    Obstructive sleep apnea       Digestive    GERD (gastroesophageal reflux disease)       Endocrine    Hyperlipidemia       Circulatory    Paroxysmal atrial fibrillation (H)    Hypertension      Other Visit Diagnoses     Erectile dysfunction, unspecified erectile dysfunction type    -  Primary    Relevant Medications    tadalafil (CIALIS) 5 MG tablet         Continue on same medicines    yrly f/u cardiology    On melatonin, helps little  Keep with life style changes,   As for insomnia, trazodone, , but given on flecainide, will not start     Vit and calcium, cut down to 2 tabs daily.     TSH stable on current dose for last 12 yrs.      B12 , can still supplements, 500 mcg 2 - 3 times a week         Work on weight loss  Regular exercise  See Patient Instructions    No follow-ups on file.    Edu Reyes MD  [facility] CAIN Soni is a 75 year old who presents for the following health issues   HPI     Follow up-- pt has questions about changing a couple of his medications; cutting back on losartan and changing from sildenafil to cialis    ALI ASKS HIM TO CUT DOWN ON CALCLIUM CITRATE,   ASKED FLAXSEED OIL,       BP 5- 10 points higher, than her machine,   Av -71 , on losartan 100 mg, denies dizziness,     Prostate tested, had prostatectomy, PSA<0.01      Has frequency every couple hrs and at night every 2 hours, . Does not drink much coffee,      Drinks enough water during day    Since prostatectomy has ED, seen urology, recommended self injection thx , training session next week.     Sildenafil creating nasal stuffiness. Was on 60 mg sildenafil, no side other nasal congestion,   He wants to try Cialis,     Discussed with DHEA supplements,     COLONOSCOPY LAST October WAS FINE, hyperplastic polyps,     Keep exercise,     Uses CPAP daily,      Review of Systems   Constitutional, HEENT,  cardiovascular, pulmonary, gi and gu systems are negative, except as otherwise noted.      Objective    /70 (BP Location: Left arm, Cuff Size: Adult Regular)   Pulse 59   Temp 97  F (36.1  C) (Tympanic)   Wt 83.5 kg (184 lb)   SpO2 96%   BMI 27.17 kg/m    Body mass index is 27.17 kg/m .  Physical Exam   GENERAL: healthy, alert and no distress  EYES: Eyes grossly normal to inspection, PERRL and conjunctivae and sclerae normal  HENT: ear canals and TM's normal, nose and mouth without ulcers or lesions  NECK: no adenopathy, no asymmetry, masses, or scars and thyroid normal to palpation  RESP: lungs clear to auscultation - no rales, rhonchi or wheezes  CV: regular rate and rhythm, normal S1 S2, no S3 or S4, no murmur, click or rub, no peripheral edema and peripheral pulses strong  ABDOMEN: soft, nontender, no hepatosplenomegaly, no masses and bowel sounds normal  MS: no gross musculoskeletal defects noted, no edema  SKIN: no suspicious lesions or rashes  NEURO: Normal strength and tone, mentation intact and speech normal  PSYCH: mentation appears normal, affect normal/bright    Orders Only on 03/17/2021   Component Date Value Ref Range Status     PSA 03/17/2021 <0.01  0 - 4 ug/L Final    Assay Method:  Chemiluminescence using Siemens Vista analyzer

## 2021-04-12 DIAGNOSIS — E03.9 HYPOTHYROIDISM, UNSPECIFIED TYPE: ICD-10-CM

## 2021-04-12 DIAGNOSIS — I48.0 PAROXYSMAL ATRIAL FIBRILLATION (H): Primary | ICD-10-CM

## 2021-04-12 DIAGNOSIS — I48.92 ATRIAL FLUTTER, UNSPECIFIED TYPE (H): ICD-10-CM

## 2021-04-12 NOTE — TELEPHONE ENCOUNTER
Reason for Call:  Medication or medication refill:    Do you use a [facility]?  Name of the pharmacy and phone number for the current request:       [facility] - [address]      Name of the medication requested:   flecainide (TAMBOCOR) 100 MG tablet  &  levothyroxine (LEVOXYL) 100 MCG tablet  &  apixaban ANTICOAGULANT (ELIQUIS) 5 MG tablet    Other request:     Can we leave a detailed message on this number? YES    Phone number patient can be reached at: Home number on file [phone] (home)    Best Time: any    Call taken on 4/12/2021 at 1:46 PM by Sylvie Lopez

## 2021-04-14 ENCOUNTER — OFFICE VISIT (OUTPATIENT)
Dept: UROLOGY | Facility: CLINIC | Age: 76
End: 2021-04-14
Payer: MEDICARE

## 2021-04-14 DIAGNOSIS — N52.9 ORGANIC ERECTILE DYSFUNCTION: Primary | ICD-10-CM

## 2021-04-14 PROCEDURE — 99211 OFF/OP EST MAY X REQ PHY/QHP: CPT

## 2021-04-14 RX ORDER — ALPROSTADIL 20 UG/ML
20 INJECTION, POWDER, LYOPHILIZED, FOR SOLUTION INTRACAVERNOUS PRN
Qty: 40 MCG | Refills: 3 | Status: SHIPPED | OUTPATIENT
Start: 2021-04-14 | End: 2022-05-27

## 2021-04-14 NOTE — PROGRESS NOTES
See nurse note to for details of the visit today.    Rema Shaffer, RN   Care Coordinator Urology

## 2021-04-14 NOTE — NURSING NOTE
Chief Complaint   Patient presents with     Clinic Care Coordination - Face To Face     injection teaching       Patient Active Problem List   Diagnosis     Smoking history     Paroxysmal atrial fibrillation (H)     Atrial flutter (H)     GERD (gastroesophageal reflux disease)     Hyperlipidemia     Obstructive sleep apnea     Hypertension     Left inguinal hernia       Allergies   Allergen Reactions     Amlodipine Swelling     Noted at 5 mg     Extract Of Poison Ivy      Other Food Allergy      Oats     Lisinopril Cough       Current Outpatient Medications   Medication Sig Dispense Refill     alprostadil (EDEX) 20 MCG kit 20 mcg by Intracavitary route as needed for erectile dysfunction use no more than 3 times per week 40 mcg 3     amoxicillin (AMOXIL) 500 MG capsule Take 500 mg by mouth Prior to dentist 2000mg       apixaban ANTICOAGULANT (ELIQUIS) 5 MG tablet Take by mouth 2 times daily        Ascorbic Acid (VITAMIN C) 500 MG CAPS Take 500 mg by mouth daily 500 mg in Am and 1,000 mg in PM       AZELASTINE HCL .5 mcg        calcium citrate-vitamin D (CITRACAL) 315-200 MG-UNIT TABS per tablet 2 tablets daily        famotidine (PEPCID) 20 MG tablet Take 20 mg by mouth daily        flecainide (TAMBOCOR) 100 MG tablet Take 100 mg by mouth 2 times daily        hydrocortisone (CORTAID) 0.5 % external cream Apply topically daily as needed       ketoconazole (NIZORAL) 2 % cream Apply topically daily as needed for itching        ketoconazole (NIZORAL) 2 % external shampoo Apply topically every other day        levothyroxine (LEVOXYL) 100 MCG tablet Take 100 mcg by mouth daily        losartan (COZAAR) 50 MG tablet Take 2 tablets (100 mg) by mouth At Bedtime 180 tablet 3     magnesium 200 MG TABS Take 250 mg by mouth daily       Misc Natural Products (GLUCOSAMINE CHOND MSM FORMULA PO) Take 1 tablet by mouth daily        Multiple Vitamins-Minerals (VITRUM 50+ SENIOR MULTI) TABS Take by mouth daily       Potassium  Gluconate 595 MG CAPS Take 595 mg by mouth daily       sildenafil (REVATIO) 20 MG tablet Take 3 tablets (20 mg) by mouth daily as needed for erectile dysfunction 90 tablet 3     tadalafil (CIALIS) 5 MG tablet Take 1 tablet (5 mg) by mouth daily as needed (Erectile dysfunction) 30 tablet 0     tretinoin (RETIN-A) 0.05 % external cream Apply topically At Bedtime         Social History     Tobacco Use     Smoking status: Never Smoker     Smokeless tobacco: Never Used   Substance Use Topics     Alcohol use: Yes     Comment: Less than 5 ounces of venkat/week     Drug use: Never       Zachariah Ram presents to clinic for Edex injection teaching.  Edex video watched in clinic by patient.  Reviewed consent and possible side effects.  Questions answered appropriately.  Patient injected 20 mcg with without assistance.  Patient rates his erection a 8/10.  Patient to call if he has any questions or concerns.  Prescription for Edex 20 mcg.  The following medication was given:     MEDICATION:  Edex  ROUTE: intracavitary  SITE: penis  DOSE: 20 mcg  LOT #: [id number]  : [employer]  EXPIRATION DATE: 01/2022  NDC#: 77087-426-77  Was there drug waste? No  Zachariah Ram comes into clinic today with the  Ordering Provider for Pt Teaching injection teaching.        This service provided today was under the supervising provider of the day Dr. Sanchez, who was available if needed. Patient will shop pharmacies to get a good price and contact me once he has decided.     KAIN Saeed RN  April 14, 2021      Rema Shaffer RN,   4/14/2021  1:39 PM

## 2021-04-26 NOTE — TELEPHONE ENCOUNTER
Pt is wanting his Rx's to be filled at mail order pharmacy. Has surpassed 3 business days since initial request. Is checking in on this

## 2021-04-26 NOTE — TELEPHONE ENCOUNTER
"Routing refill request to provider for review/approval because:  Flecainide - no recent labs/historical on list   Levothyroxine - historical on list, no recent labs  Eliquis - no recent labs/historical on list     Requested Prescriptions   Pending Prescriptions Disp Refills     flecainide (TAMBOCOR) 100 MG tablet       Sig: Take 1 tablet (100 mg) by mouth 2 times daily       Anti Arrhythmic Agents Protocol Failed - 4/26/2021 11:38 AM        Failed - Lipid Panel on file in past year     No lab results found.            Failed - CBC on file in past year     No lab results found.              Failed - ALT on file in past year     No lab results found.          Failed - Serum Creatinine on file in past year     Recent Labs   Lab Test 01/14/19  1403   CR 1.16       Ok to refill medication if creatinine is low          Failed - Medication needs approval from authorizing provider     Please forward this request to the authorizing provider for approval.           Failed - Serum Sodium on file in past year     Recent Labs   Lab Test 01/14/19  1403   *              Failed - Serum Potassium on file in past year     Recent Labs   Lab Test 01/14/19  1403   POTASSIUM 4.2             Passed - Patient is 18 years of age or older        Passed - Recent (6 mo) or future (30 days) visit with authorizing provider's specialty     Patient had office visit in the last 6 months or has a visit in the next 30 days with authorizing provider.  See \"Patient Info\" tab in inbasket, or \"Choose Columns\" in Meds & Orders section of the refill encounter.               levothyroxine (LEVOXYL) 100 MCG tablet       Sig: Take 1 tablet (100 mcg) by mouth daily       Thyroid Protocol Failed - 4/26/2021 11:38 AM        Failed - Normal TSH on file in past 12 months     No lab results found.           Passed - Patient is 12 years or older        Passed - Recent (12 mo) or future (30 days) visit within the authorizing provider's specialty     Patient has " "had an office visit with the authorizing provider or a provider within the authorizing providers department within the previous 12 mos or has a future within next 30 days. See \"Patient Info\" tab in inbasket, or \"Choose Columns\" in Meds & Orders section of the refill encounter.              Passed - Medication is active on med list           apixaban ANTICOAGULANT (ELIQUIS ANTICOAGULANT) 5 MG tablet       Sig: Take by mouth 2 times daily       Direct Oral Anticoagulant Agents Failed - 4/26/2021 11:38 AM        Failed - Normal Platelets on file in past 12 months     No lab results found.            Failed - Serum creatinine less than or equal to 1.4 on file in past 12 mos     Recent Labs   Lab Test 01/14/19  1403   CR 1.16       Ok to refill medication if creatinine is low          Passed - Medication is active on med list        Passed - Patient is 18-79 years of age        Passed - Weight is greater than 60 kg for the past year     Wt Readings from Last 3 Encounters:   04/09/21 83.5 kg (184 lb)   02/08/21 84.4 kg (186 lb)   01/27/21 84.2 kg (185 lb 11.2 oz)             Passed - Recent (6 mo) or future (30 days) visit within the authorizing provider's specialty             "

## 2021-04-27 RX ORDER — FLECAINIDE ACETATE 100 MG/1
100 TABLET ORAL 2 TIMES DAILY
Qty: 60 TABLET | Refills: 0 | Status: SHIPPED | OUTPATIENT
Start: 2021-04-27 | End: 2021-04-28

## 2021-04-27 RX ORDER — LEVOTHYROXINE SODIUM 100 UG/1
100 TABLET ORAL DAILY
Qty: 30 TABLET | Refills: 0 | Status: SHIPPED | OUTPATIENT
Start: 2021-04-27 | End: 2021-05-03

## 2021-04-27 NOTE — TELEPHONE ENCOUNTER
Patient only has 3 days of meds left and is asking that this be expedited.  Thanks!  Una KimbleD, [facility]  Medication Therapy Management Provider  Pager: [phone]

## 2021-04-28 ENCOUNTER — TELEPHONE (OUTPATIENT)
Dept: CARDIOLOGY | Facility: CLINIC | Age: 76
End: 2021-04-28

## 2021-04-28 DIAGNOSIS — I48.0 PAROXYSMAL ATRIAL FIBRILLATION (H): ICD-10-CM

## 2021-04-28 DIAGNOSIS — I48.92 ATRIAL FLUTTER, UNSPECIFIED TYPE (H): ICD-10-CM

## 2021-04-28 DIAGNOSIS — I10 BENIGN ESSENTIAL HYPERTENSION: ICD-10-CM

## 2021-04-28 RX ORDER — FLECAINIDE ACETATE 100 MG/1
100 TABLET ORAL 2 TIMES DAILY
Qty: 180 TABLET | Refills: 2 | Status: SHIPPED | OUTPATIENT
Start: 2021-04-28 | End: 2022-01-10

## 2021-04-28 RX ORDER — LOSARTAN POTASSIUM 50 MG/1
100 TABLET ORAL AT BEDTIME
Qty: 180 TABLET | Refills: 2 | Status: SHIPPED | OUTPATIENT
Start: 2021-04-28 | End: 2022-04-20

## 2021-04-28 NOTE — TELEPHONE ENCOUNTER
Message left for patient to let him know that flecaindie, eliquis and losartan will be filled by the cardiology team.  Also that he is due for annual follow up on 10/2021 with echo prior.  Informed patient that he did not need to call back unless he had any further questions.  Medications refills [facility]  KAIN Torres

## 2021-04-29 DIAGNOSIS — N52.9 ORGANIC ERECTILE DYSFUNCTION: Primary | ICD-10-CM

## 2021-05-13 DIAGNOSIS — E03.9 HYPOTHYROIDISM, UNSPECIFIED TYPE: Primary | ICD-10-CM

## 2021-06-15 DIAGNOSIS — E03.9 HYPOTHYROIDISM, UNSPECIFIED TYPE: ICD-10-CM

## 2021-06-15 DIAGNOSIS — I48.0 PAROXYSMAL ATRIAL FIBRILLATION (H): ICD-10-CM

## 2021-06-15 LAB
MAGNESIUM SERPL-MCNC: 2.2 MG/DL (ref 1.6–2.3)
TSH SERPL DL<=0.005 MIU/L-ACNC: 2.06 MU/L (ref 0.4–4)

## 2021-06-15 PROCEDURE — 83735 ASSAY OF MAGNESIUM: CPT | Performed by: PHYSICIAN ASSISTANT

## 2021-06-15 PROCEDURE — 84443 ASSAY THYROID STIM HORMONE: CPT | Performed by: PHYSICIAN ASSISTANT

## 2021-06-15 PROCEDURE — 36415 COLL VENOUS BLD VENIPUNCTURE: CPT | Performed by: PHYSICIAN ASSISTANT

## 2021-06-16 DIAGNOSIS — N52.9 ORGANIC ERECTILE DYSFUNCTION: Primary | ICD-10-CM

## 2021-06-16 DIAGNOSIS — N52.9 ORGANIC ERECTILE DYSFUNCTION: ICD-10-CM

## 2021-08-10 ENCOUNTER — LAB (OUTPATIENT)
Dept: LAB | Facility: CLINIC | Age: 76
End: 2021-08-10
Attending: UROLOGY
Payer: MEDICARE

## 2021-08-10 DIAGNOSIS — C61 PROSTATE CANCER (H): ICD-10-CM

## 2021-08-10 LAB — PSA SERPL-MCNC: <0.01 UG/L (ref 0–4)

## 2021-08-10 PROCEDURE — 84153 ASSAY OF PSA TOTAL: CPT

## 2021-08-10 PROCEDURE — 36415 COLL VENOUS BLD VENIPUNCTURE: CPT

## 2021-08-20 ENCOUNTER — HOSPITAL ENCOUNTER (OUTPATIENT)
Dept: CARDIOLOGY | Facility: CLINIC | Age: 76
Discharge: HOME | End: 2021-08-20
Attending: PHYSICIAN ASSISTANT | Admitting: PHYSICIAN ASSISTANT
Payer: MEDICARE

## 2021-08-20 DIAGNOSIS — I48.0 PAROXYSMAL ATRIAL FIBRILLATION (H): ICD-10-CM

## 2021-08-20 DIAGNOSIS — I10 BENIGN ESSENTIAL HYPERTENSION: ICD-10-CM

## 2021-08-20 DIAGNOSIS — Z86.79 H/O MITRAL VALVE DISEASE: ICD-10-CM

## 2021-08-20 LAB — LVEF ECHO: NORMAL

## 2021-08-20 PROCEDURE — 93306 TTE W/DOPPLER COMPLETE: CPT | Mod: 26 | Performed by: INTERNAL MEDICINE

## 2021-08-20 PROCEDURE — 93306 TTE W/DOPPLER COMPLETE: CPT

## 2021-08-23 ENCOUNTER — TELEPHONE (OUTPATIENT)
Dept: PHARMACY | Facility: CLINIC | Age: 76
End: 2021-08-23

## 2021-08-23 NOTE — TELEPHONE ENCOUNTER
We have been unable to reach this patient for MTM follow-up after several attempts. We will stop reaching out to the patient at this time. Please let us know if we can assist in this patient's care in the future.    Routing to PCP as Una AbrahamD, Psychiatric  Medication Therapy Management Provider  Pager: [phone]

## 2021-08-27 ENCOUNTER — TELEPHONE (OUTPATIENT)
Dept: FAMILY MEDICINE | Facility: CLINIC | Age: 76
End: 2021-08-27

## 2021-08-27 NOTE — TELEPHONE ENCOUNTER
Disp Refills Start End KOKI   ketoconazole (NIZORAL) 2 % external shampoo 100 mL 0 8/25/2021  No   Sig - Route: Apply topically every other day - Topical     Fax received from pharmacy regarding above med    Note from pharmacy:  Please clarify the directions. This medication is typically used as a lather shampoo; if topical application is intended Ketoconazole cream is recommended.

## 2021-08-30 ENCOUNTER — OFFICE VISIT (OUTPATIENT)
Dept: CARDIOLOGY | Facility: CLINIC | Age: 76
End: 2021-08-30
Attending: PHYSICIAN ASSISTANT
Payer: MEDICARE

## 2021-08-30 VITALS
HEART RATE: 51 BPM | HEIGHT: 69 IN | SYSTOLIC BLOOD PRESSURE: 124 MMHG | WEIGHT: 183 LBS | BODY MASS INDEX: 27.11 KG/M2 | DIASTOLIC BLOOD PRESSURE: 65 MMHG

## 2021-08-30 DIAGNOSIS — I48.92 ATRIAL FLUTTER, UNSPECIFIED TYPE (H): Primary | ICD-10-CM

## 2021-08-30 DIAGNOSIS — I48.0 PAROXYSMAL ATRIAL FIBRILLATION (H): ICD-10-CM

## 2021-08-30 PROCEDURE — 99213 OFFICE O/P EST LOW 20 MIN: CPT | Performed by: INTERNAL MEDICINE

## 2021-08-30 PROCEDURE — 93000 ELECTROCARDIOGRAM COMPLETE: CPT | Performed by: INTERNAL MEDICINE

## 2021-08-30 RX ORDER — LATANOPROST 50 UG/ML
1 SOLUTION/ DROPS OPHTHALMIC DAILY
COMMUNITY

## 2021-08-30 ASSESSMENT — MIFFLIN-ST. JEOR: SCORE: 1550.71

## 2021-08-30 NOTE — PROGRESS NOTES
HPI and Plan:   See dictation  [id number]  Orders Placed This Encounter   Procedures     EKG 12-lead complete w/read - Clinics (performed today)       Orders Placed This Encounter   Medications     latanoprost (XALATAN) 0.005 % ophthalmic solution     Sig: Place 1 drop into both eyes daily     UNABLE TO FIND     Sig: daily as needed Papav/phent/alpro/ethy/sodium 0.2 mL injection       There are no discontinued medications.      Encounter Diagnoses   Name Primary?     Paroxysmal atrial fibrillation (H)      Atrial flutter, unspecified type (H) Yes       CURRENT MEDICATIONS:  Current Outpatient Medications   Medication Sig Dispense Refill     alprostadil (EDEX) 20 MCG kit 20 mcg by Intracavitary route as needed for erectile dysfunction use no more than 3 times per week 40 mcg 3     amoxicillin (AMOXIL) 500 MG capsule Prior to dentist 2000mg [30-60 minutes prior to procedure] 4 capsule 1     apixaban ANTICOAGULANT (ELIQUIS ANTICOAGULANT) 5 MG tablet Take 1 tablet (5 mg) by mouth 2 times daily 180 tablet 2     Ascorbic Acid (VITAMIN C) 500 MG CAPS Take 500 mg by mouth daily 500 mg in Am and 1,000 mg in PM       AZELASTINE HCL .5 mcg        calcium citrate-vitamin D (CITRACAL) 315-200 MG-UNIT TABS per tablet 2 tablets daily        famotidine (PEPCID) 20 MG tablet Take 20 mg by mouth daily        flecainide (TAMBOCOR) 100 MG tablet Take 1 tablet (100 mg) by mouth 2 times daily 180 tablet 2     hydrocortisone (CORTAID) 0.5 % external cream Apply topically daily as needed       ketoconazole (NIZORAL) 2 % cream Apply topically daily as needed for itching        ketoconazole (NIZORAL) 2 % external shampoo Apply topically every other day 100 mL 0     latanoprost (XALATAN) 0.005 % ophthalmic solution Place 1 drop into both eyes daily       levothyroxine (LEVOXYL) 100 MCG tablet Take 1 tablet (100 mcg) by mouth daily 90 tablet 2     losartan (COZAAR) 50 MG tablet Take 2 tablets (100 mg) by mouth At Bedtime 180 tablet 2      magnesium 200 MG TABS Take 250 mg by mouth daily       Misc Natural Products (GLUCOSAMINE CHOND MSM FORMULA PO) Take 1 tablet by mouth daily        Multiple Vitamins-Minerals (VITRUM 50+ SENIOR MULTI) TABS Take by mouth daily       Potassium Gluconate 595 MG CAPS Take 595 mg by mouth daily       tadalafil (CIALIS) 5 MG tablet Take 1 tablet (5 mg) by mouth daily as needed (Erectile dysfunction) 30 tablet 1     tretinoin (RETIN-A) 0.05 % external cream Apply topically At Bedtime       UNABLE TO FIND daily as needed Papav/phent/alpro/ethy/sodium 0.2 mL injection       COMPOUNDED NON-CONTROLLED SUBSTANCE (CMPD RX) - PHARMACY TO MIX COMPOUNDED MEDICATION Alprostadil 50 mcg inject 0.4 mL intracavitary as needed no more than three times a week with 24 hours in between injections 2.5 mL 4     NEW MED Trimix # 8  Papaverine 27 mg/mL  Phentolamine 1 mg/mL  Alprostadil 20 mcg/mL  Sig: inject 0.2 mLs intracavernous daily as needed. Allow 24 hours between injections. Use no more than three time weekly  May increase in increments of 0.1 mL up 0.8 mL 5 mL 4     NEW MED Alprostadil 50 mcg inject 0.4 mL intracavitary as needed no more than three times a week with 24 hours in between injections 2.5 mL 4     sildenafil (REVATIO) 20 MG tablet Take 3 tablets (20 mg) by mouth daily as needed for erectile dysfunction 90 tablet 3       ALLERGIES     Allergies   Allergen Reactions     Amlodipine Swelling     Noted at 5 mg     Extract Of Poison Ivy      Other Food Allergy      Oats     Lisinopril Cough       PAST MEDICAL HISTORY:  Past Medical History:   Diagnosis Date     Antiplatelet or antithrombotic long-term use      Arrhythmia     A FIB     Atrial flutter (H)      BPH (benign prostatic hyperplasia)      ED (erectile dysfunction)      Elevated prostate specific antigen (PSA)      GERD (gastroesophageal reflux disease)      Heart murmur     MVP     History of blood transfusion 1996 [place] open heart surgery.    Repaired mitral valve.      History of skin cancer      Hyperlipidemia      Hypertension      Insomnia      Left inguinal hernia      Lentigo maligna melanoma (H)      MVP (mitral valve prolapse)      Nasal congestion      Obstructive sleep apnea     SEVERE    -  USES CPAP     Paroxysmal atrial fibrillation (H)      Schatzki's ring      Seborrheic dermatitis      Smoking history     quit 1973     Thyroid disease Partial thyroidectomy , benign tumor.    Thyroid controlled by levothyroxine.     Urinary frequency        PAST SURGICAL HISTORY:  Past Surgical History:   Procedure Laterality Date     ABDOMEN SURGERY  Hernia right side .  Prostatectomy [place]     Normal side effects.     BIOPSY  For various skin cancers, prostate.    Treatment completed at the time.     CARDIOVERSION  2017     COLONOSCOPY       at [facility] ([facility], [place], [place])     EGD, GASTROESOPHAGEAL REFLUX TEST WITH MUCOSAL PH ELECTRODE       ENT SURGERY  Partial parotidectomy     Benign tumor.     GENITOURINARY SURGERY  Prostatectomy [place] .    Usual side effects.     HC KNEE SCOPE,MED/LAT MENISCUS REPAIR Left 2016     HERNIA REPAIR  1994     HERNIORRHAPHY INGUINAL Left 2021    Procedure: OPEN LEFT INGUINAL HERNIA REPAIR;  Surgeon: Bernard Caballero MD;  Location:  OR     LASIK       Left atrial appendage ligation       MR PROSTATE  WO & W CONTRAST  ,      PAROTIDECTOMY  2004    partial parotidectomy     REPAIR VALVE MITRAL       SKIN CANCER SCREENING      Skin cancer surgeries-- basal on ear , melanoma on left sideburn , carcinoma right arm 2013     THYROIDECTOMY  2008    partial thyroidectomy       FAMILY HISTORY:  Family History   Problem Relation Age of Onset     Coronary Artery Disease Early Onset Father      Coronary Artery Disease Father         Problems age 50,  age 66.     Diabetes Brother         Since est age 50, overweight then.     Coronary Artery Disease Brother          AFib,etc.  [place] pace maker/defibrulator     Diabetes Maternal Grandfather      Coronary Artery Disease Brother         Mitral valve repaire age 60     Other Cancer Sister          age [phone]     Other Cancer Paternal Grandfather         Cancer of the mouth,  age 66.       SOCIAL HISTORY:  Social History     Socioeconomic History     Marital status:      Spouse name: None     Number of children: None     Years of education: None     Highest education level: None   Occupational History     None   Tobacco Use     Smoking status: Never Smoker     Smokeless tobacco: Never Used   Substance and Sexual Activity     Alcohol use: Yes     Comment: Less than 5 ounces of venkat/week     Drug use: Never     Sexual activity: Yes     Partners: Female     Birth control/protection: Post-menopausal     Comment: ED from prostatectomy, work in progress.   Other Topics Concern     Parent/sibling w/ CABG, MI or angioplasty before 65F 55M? No   Social History Narrative     None     Social Determinants of Health     Financial Resource Strain:      Difficulty of Paying Living Expenses:    Food Insecurity:      Worried About Running Out of Food in the Last Year:      Ran Out of Food in the Last Year:    Transportation Needs:      Lack of Transportation (Medical):      Lack of Transportation (Non-Medical):    Physical Activity:      Days of Exercise per Week:      Minutes of Exercise per Session:    Stress:      Feeling of Stress :    Social Connections:      Frequency of Communication with Friends and Family:      Frequency of Social Gatherings with Friends and Family:      Attends Gnosticist Services:      Active Member of Clubs or Organizations:      Attends Club or Organization Meetings:      Marital Status:    Intimate Partner Violence:      Fear of Current or Ex-Partner:      Emotionally Abused:      Physically Abused:      Sexually Abused:        Review of Systems:  Skin:  Negative       Eyes:  Positive for glasses   "  ENT:  Negative      Respiratory:  Positive for cough dry cough   Cardiovascular:  Negative for;palpitations;chest pain;fatigue;dizziness;edema      Gastroenterology: Negative melena;hematochezia    Genitourinary:  Positive for prostate problem    Musculoskeletal:  Negative foot pain R lateral erythema/lump near little toe (Tailor's bunion?)  Neurologic:  Negative      Psychiatric:  Negative      Heme/Lymph/Imm:  Positive for allergies    Endocrine:  Positive for thyroid disorder      Physical Exam:  Vitals: /65   Pulse 51   Ht 1.753 m (5' 9.02\")   Wt 83 kg (183 lb)   BMI 27.01 kg/m      Constitutional:  cooperative, alert and oriented, well developed, well nourished, in no acute distress        Skin:  warm and dry to the touch, no apparent skin lesions or masses noted          Head:  normocephalic, no masses or lesions        Eyes:  pupils equal and round, conjunctivae and lids unremarkable, sclera white, no xanthalasma, EOMS intact, no nystagmus        Lymph:No Cervical lymphadenopathy present     ENT:  no pallor or cyanosis        Neck:  carotid pulses are full and equal bilaterally, JVP normal, no carotid bruit        Respiratory:  normal breath sounds, clear to auscultation, normal A-P diameter, normal symmetry, normal respiratory excursion, no use of accessory muscles         Cardiac: regular rhythm, normal S1/S2, no S3 or S4, apical impulse not displaced, no murmurs, gallops or rubs bradycardic              pulses full and equal, no bruits auscultated                                        GI:  abdomen soft, non-tender, BS normoactive, no mass, no HSM, no bruits        Extremities and Muscular Skeletal:  no deformities, clubbing, cyanosis, erythema observed              Neurological:  no gross motor deficits        Psych:  Alert and Oriented x 3        CC  Sera Gayle PA-C  [address]  [place] [address]              "

## 2021-08-30 NOTE — LETTER
8/30/2021    Edu Reyes MD  [address]  [place] [address]    RE: Zachariah Ram       Dear Colleague,    I had the pleasure of seeing Zachariah Ram in the [facility].    HPI and Plan:   See dictation  [id number]  Orders Placed This Encounter   Procedures     EKG 12-lead complete w/read - Clinics (performed today)       Orders Placed This Encounter   Medications     latanoprost (XALATAN) 0.005 % ophthalmic solution     Sig: Place 1 drop into both eyes daily     UNABLE TO FIND     Sig: daily as needed Papav/phent/alpro/ethy/sodium 0.2 mL injection       There are no discontinued medications.      Encounter Diagnoses   Name Primary?     Paroxysmal atrial fibrillation (H)      Atrial flutter, unspecified type (H) Yes       CURRENT MEDICATIONS:  Current Outpatient Medications   Medication Sig Dispense Refill     alprostadil (EDEX) 20 MCG kit 20 mcg by Intracavitary route as needed for erectile dysfunction use no more than 3 times per week 40 mcg 3     amoxicillin (AMOXIL) 500 MG capsule Prior to dentist 2000mg [30-60 minutes prior to procedure] 4 capsule 1     apixaban ANTICOAGULANT (ELIQUIS ANTICOAGULANT) 5 MG tablet Take 1 tablet (5 mg) by mouth 2 times daily 180 tablet 2     Ascorbic Acid (VITAMIN C) 500 MG CAPS Take 500 mg by mouth daily 500 mg in Am and 1,000 mg in PM       AZELASTINE HCL .5 mcg        calcium citrate-vitamin D (CITRACAL) 315-200 MG-UNIT TABS per tablet 2 tablets daily        famotidine (PEPCID) 20 MG tablet Take 20 mg by mouth daily        flecainide (TAMBOCOR) 100 MG tablet Take 1 tablet (100 mg) by mouth 2 times daily 180 tablet 2     hydrocortisone (CORTAID) 0.5 % external cream Apply topically daily as needed       ketoconazole (NIZORAL) 2 % cream Apply topically daily as needed for itching        ketoconazole (NIZORAL) 2 % external shampoo Apply topically every other day 100 mL 0     latanoprost (XALATAN) 0.005 %  ophthalmic solution Place 1 drop into both eyes daily       levothyroxine (LEVOXYL) 100 MCG tablet Take 1 tablet (100 mcg) by mouth daily 90 tablet 2     losartan (COZAAR) 50 MG tablet Take 2 tablets (100 mg) by mouth At Bedtime 180 tablet 2     magnesium 200 MG TABS Take 250 mg by mouth daily       Misc Natural Products (GLUCOSAMINE CHOND MSM FORMULA PO) Take 1 tablet by mouth daily        Multiple Vitamins-Minerals (VITRUM 50+ SENIOR MULTI) TABS Take by mouth daily       Potassium Gluconate 595 MG CAPS Take 595 mg by mouth daily       tadalafil (CIALIS) 5 MG tablet Take 1 tablet (5 mg) by mouth daily as needed (Erectile dysfunction) 30 tablet 1     tretinoin (RETIN-A) 0.05 % external cream Apply topically At Bedtime       UNABLE TO FIND daily as needed Papav/phent/alpro/ethy/sodium 0.2 mL injection       COMPOUNDED NON-CONTROLLED SUBSTANCE (CMPD RX) - PHARMACY TO MIX COMPOUNDED MEDICATION Alprostadil 50 mcg inject 0.4 mL intracavitary as needed no more than three times a week with 24 hours in between injections 2.5 mL 4     NEW MED Trimix # 8  Papaverine 27 mg/mL  Phentolamine 1 mg/mL  Alprostadil 20 mcg/mL  Sig: inject 0.2 mLs intracavernous daily as needed. Allow 24 hours between injections. Use no more than three time weekly  May increase in increments of 0.1 mL up 0.8 mL 5 mL 4     NEW MED Alprostadil 50 mcg inject 0.4 mL intracavitary as needed no more than three times a week with 24 hours in between injections 2.5 mL 4     sildenafil (REVATIO) 20 MG tablet Take 3 tablets (20 mg) by mouth daily as needed for erectile dysfunction 90 tablet 3       ALLERGIES     Allergies   Allergen Reactions     Amlodipine Swelling     Noted at 5 mg     Extract Of Poison Ivy      Other Food Allergy      Oats     Lisinopril Cough       PAST MEDICAL HISTORY:  Past Medical History:   Diagnosis Date     Antiplatelet or antithrombotic long-term use      Arrhythmia     A FIB     Atrial flutter (H)      BPH (benign prostatic  hyperplasia)      ED (erectile dysfunction)      Elevated prostate specific antigen (PSA)      GERD (gastroesophageal reflux disease)      Heart murmur     MVP     History of blood transfusion 1996 [place] open heart surgery.    Repaired mitral valve.     History of skin cancer      Hyperlipidemia      Hypertension      Insomnia      Left inguinal hernia      Lentigo maligna melanoma (H)      MVP (mitral valve prolapse)      Nasal congestion      Obstructive sleep apnea     SEVERE    -  USES CPAP     Paroxysmal atrial fibrillation (H)      Schatzki's ring      Seborrheic dermatitis      Smoking history     quit 1973     Thyroid disease Partial thyroidectomy 2008, benign tumor.    Thyroid controlled by levothyroxine.     Urinary frequency        PAST SURGICAL HISTORY:  Past Surgical History:   Procedure Laterality Date     ABDOMEN SURGERY  Hernia right side 1994.  Prostatectomy [place] 5/20    Normal side effects.     BIOPSY  For various skin cancers, prostate.    Treatment completed at the time.     CARDIOVERSION  08/07/2017     COLONOSCOPY  2015     at [facility] ([facility], [place], [place])     EGD, GASTROESOPHAGEAL REFLUX TEST WITH MUCOSAL PH ELECTRODE       ENT SURGERY  Partial parotidectomy 2004    Benign tumor.     GENITOURINARY SURGERY  Prostatectomy [place] 5/20.    Usual side effects.     HC KNEE SCOPE,MED/LAT MENISCUS REPAIR Left 2016     HERNIA REPAIR  1994     HERNIORRHAPHY INGUINAL Left 1/27/2021    Procedure: OPEN LEFT INGUINAL HERNIA REPAIR;  Surgeon: Bernard Caballero MD;  Location:  OR     [facility]       Left atrial appendage ligation  1996     MR PROSTATE  WO & W CONTRAST  2016, 2018     PAROTIDECTOMY  2004    partial parotidectomy     REPAIR VALVE MITRAL  1996     SKIN CANCER SCREENING      Skin cancer surgeries-- basal on ear 2008, melanoma on left sideburn 2011, carcinoma right arm 2013     THYROIDECTOMY  2008    partial thyroidectomy       FAMILY HISTORY:  Family History   Problem  Relation Age of Onset     Coronary Artery Disease Early Onset Father      Coronary Artery Disease Father         Problems age 50,  age 66.     Diabetes Brother         Since est age 50, overweight then.     Coronary Artery Disease Brother         AFib,etc.  [place] pace maker/defibrulator     Diabetes Maternal Grandfather      Coronary Artery Disease Brother         Mitral valve repaire age 60     Other Cancer Sister          age [phone]     Other Cancer Paternal Grandfather         Cancer of the mouth,  age 66.       SOCIAL HISTORY:  Social History     Socioeconomic History     Marital status:      Spouse name: None     Number of children: None     Years of education: None     Highest education level: None   Occupational History     None   Tobacco Use     Smoking status: Never Smoker     Smokeless tobacco: Never Used   Substance and Sexual Activity     Alcohol use: Yes     Comment: Less than 5 ounces of venkat/week     Drug use: Never     Sexual activity: Yes     Partners: Female     Birth control/protection: Post-menopausal     Comment: ED from prostatectomy, work in progress.   Other Topics Concern     Parent/sibling w/ CABG, MI or angioplasty before 65F 55M? No   Social History Narrative     None     Social Determinants of Health     Financial Resource Strain:      Difficulty of Paying Living Expenses:    Food Insecurity:      Worried About Running Out of Food in the Last Year:      Ran Out of Food in the Last Year:    Transportation Needs:      Lack of Transportation (Medical):      Lack of Transportation (Non-Medical):    Physical Activity:      Days of Exercise per Week:      Minutes of Exercise per Session:    Stress:      Feeling of Stress :    Social Connections:      Frequency of Communication with Friends and Family:      Frequency of Social Gatherings with Friends and Family:      Attends Restorationism Services:      Active Member of Clubs or Organizations:      Attends Club or Organization  "Meetings:      Marital Status:    Intimate Partner Violence:      Fear of Current or Ex-Partner:      Emotionally Abused:      Physically Abused:      Sexually Abused:        Review of Systems:  Skin:  Negative       Eyes:  Positive for glasses    ENT:  Negative      Respiratory:  Positive for cough dry cough   Cardiovascular:  Negative for;palpitations;chest pain;fatigue;dizziness;edema      Gastroenterology: Negative melena;hematochezia    Genitourinary:  Positive for prostate problem    Musculoskeletal:  Negative foot pain R lateral erythema/lump near little toe (Tailor's bunion?)  Neurologic:  Negative      Psychiatric:  Negative      Heme/Lymph/Imm:  Positive for allergies    Endocrine:  Positive for thyroid disorder      Physical Exam:  Vitals: /65   Pulse 51   Ht 1.753 m (5' 9.02\")   Wt 83 kg (183 lb)   BMI 27.01 kg/m      Constitutional:  cooperative, alert and oriented, well developed, well nourished, in no acute distress        Skin:  warm and dry to the touch, no apparent skin lesions or masses noted          Head:  normocephalic, no masses or lesions        Eyes:  pupils equal and round, conjunctivae and lids unremarkable, sclera white, no xanthalasma, EOMS intact, no nystagmus        Lymph:No Cervical lymphadenopathy present     ENT:  no pallor or cyanosis        Neck:  carotid pulses are full and equal bilaterally, JVP normal, no carotid bruit        Respiratory:  normal breath sounds, clear to auscultation, normal A-P diameter, normal symmetry, normal respiratory excursion, no use of accessory muscles         Cardiac: regular rhythm, normal S1/S2, no S3 or S4, apical impulse not displaced, no murmurs, gallops or rubs bradycardic              pulses full and equal, no bruits auscultated                                        GI:  abdomen soft, non-tender, BS normoactive, no mass, no HSM, no bruits        Extremities and Muscular Skeletal:  no deformities, clubbing, cyanosis, erythema observed "              Neurological:  no gross motor deficits        Psych:  Alert and Oriented x 3        CC  Sera Gayle PA-C  [address]  [place] [address]                  Thank you for allowing me to participate in the care of your patient.      Sincerely,     Juan Cope MD     [facility]  cc:   Sera Gayle PA-C  [address]  [place] [address]

## 2021-08-30 NOTE — PROGRESS NOTES
Service Date: 08/30/2021    Thank you for allowing me to participate in the care of your delightful patient.  As you know, Zachariah is a 76-year-old gentleman with a history of severe MR due to mitral valve prolapse, status post repair along with ligation of the left atrial appendage in light of his known paroxysmal atrial fibrillation at that time in 1996.  I last saw the patient a few years ago.    The patient has been on flecainide for his atrial tachyarrhythmias including atrial fibrillation and atrial tachycardia with 2:1 AV conduction with excellent result.  Since I last saw him, he has not felt any atrial tachyarrhythmias.  He has been on Eliquis, even though his left atrial appendage was ligated because he continued to demonstrate a small residual communication between the left atrium and the left atrial appendage.  I had the patient perform an echocardiogram prior to this visit, given that the last one was more than 2 years ago.    We went over the results of the echo showing a normal LV function.  The patient was surprised to know that he has severe left atrial enlargement, which has been like that since the mitral valve repair.  I told Zachariah unfortunately because of the severity of his MR and likely the chronicity of it causing extreme left atrial remodeling. so despite the correction of his mitral regurgitation the left atrium never returned to normal size, but I told him that given his normal LV function and the fact that he has excellent functional class, it is really not that important.    The patient showed me his log of blood pressure with an average systolic of about 125 and heart rate is in mid 50s, which is baseline for him.  As long as the patient is able to be as active as he has been with frequent hiking and walking, there is no need to adjust medications.  EKG today demonstrates sinus rhythm at a rate of 53 beats per minute.  I would like Zachariah to come back to see Nakita, one of our advanced  practice providers, a year from now and see me a year after that.    Juan Toure MD        D: 2021   T: 2021   MT: ROSA    Name:     LA NENA GIBSON  MRN:      [id number]        Account:      [id number]   :      1945           Service Date: 2021       Document: [id number]

## 2021-08-31 DIAGNOSIS — Z29.89 NEED FOR SBE (SUBACUTE BACTERIAL ENDOCARDITIS) PROPHYLAXIS: ICD-10-CM

## 2021-08-31 DIAGNOSIS — I48.0 PAROXYSMAL ATRIAL FIBRILLATION (H): ICD-10-CM

## 2021-08-31 DIAGNOSIS — I48.92 ATRIAL FLUTTER, UNSPECIFIED TYPE (H): ICD-10-CM

## 2021-08-31 RX ORDER — AMOXICILLIN 500 MG/1
CAPSULE ORAL
Qty: 12 CAPSULE | Refills: 1 | Status: SHIPPED | OUTPATIENT
Start: 2021-08-31 | End: 2023-04-21

## 2021-08-31 NOTE — TELEPHONE ENCOUNTER
Called and spoke with patient, he uses both the cream and the shampoo. ketoconazole (NIZORAL) 2 % external shampoo.  His derm provider originally prescribed this and understands going forward derm will manager the refills.    Pretty Bauer RN

## 2021-08-31 NOTE — TELEPHONE ENCOUNTER
Please notify with patient is he using ketoconazole cream or shampoo or both and how often.  Was this prescribed by dermatology initially?  Does he see dermatology?.    Ketapply shampoo 1% TOPICALLY to wet hair, lather, rinse thoroughly, and repeat; use every 3 to 4 days for up to 8 weeks; then as needed to control dandruff oconazole shampoo

## 2021-09-01 ENCOUNTER — TELEPHONE (OUTPATIENT)
Dept: FAMILY MEDICINE | Facility: CLINIC | Age: 76
End: 2021-09-01

## 2021-09-01 NOTE — TELEPHONE ENCOUNTER
ketoconazole (NIZORAL) 2 % external shampoo 100 mL 0 8/25/2021  No   Sig - Route: Apply topically every other day - Topical   Sent to pharmacy as: Ketoconazole 2 % External Shampoo (NIZORAL)   Class: E-Prescribe   Order: [id number]   E-Prescribing Status: Receipt confirmed by pharmacy (8/25/2021  9:22 PM CDT)   Printout Tracking    External Result Report   Pharmacy    [facility] - [address], [address]     pharmacy called to clarify this     Advised of PCP sig per prior encounter: Ketoconazole shampoo 1% TOPICALLY to wet hair, lather, rinse thoroughly, and repeat; use every 3 to 4 days for up to 8 weeks; then as needed to control dandruff oconazole shampoo - FYI it comes in 120mL not 100 mL so they will dispense that     Leslye HERRMANN RN

## 2021-09-05 ENCOUNTER — HEALTH MAINTENANCE LETTER (OUTPATIENT)
Age: 76
End: 2021-09-05

## 2021-09-21 ENCOUNTER — TRANSFERRED RECORDS (OUTPATIENT)
Dept: HEALTH INFORMATION MANAGEMENT | Facility: CLINIC | Age: 76
End: 2021-09-21

## 2021-10-05 DIAGNOSIS — N52.9 ERECTILE DYSFUNCTION, UNSPECIFIED ERECTILE DYSFUNCTION TYPE: ICD-10-CM

## 2021-10-05 RX ORDER — TADALAFIL 5 MG/1
5 TABLET ORAL DAILY PRN
Qty: 30 TABLET | Refills: 1 | Status: SHIPPED | OUTPATIENT
Start: 2021-10-05 | End: 2021-10-07

## 2021-10-07 RX ORDER — TADALAFIL 5 MG/1
5 TABLET ORAL DAILY PRN
Qty: 30 TABLET | Refills: 11 | Status: SHIPPED | OUTPATIENT
Start: 2021-10-07 | End: 2022-12-12

## 2021-10-08 ENCOUNTER — TELEPHONE (OUTPATIENT)
Dept: UROLOGY | Facility: CLINIC | Age: 76
End: 2021-10-08

## 2021-10-20 DIAGNOSIS — N52.9 ERECTILE DYSFUNCTION, UNSPECIFIED ERECTILE DYSFUNCTION TYPE: Primary | ICD-10-CM

## 2021-10-21 ENCOUNTER — TELEPHONE (OUTPATIENT)
Dept: UROLOGY | Facility: CLINIC | Age: 76
End: 2021-10-21

## 2021-10-21 NOTE — TELEPHONE ENCOUNTER
Left message to expect a call from the pharmacy for new script  Rema Shaffer, RN   Care Coordinator Urology

## 2021-10-21 NOTE — TELEPHONE ENCOUNTER
----- Message from Tommy Sanchez MD sent at 10/19/2021  8:27 PM CDT -----  Yes that's fine  ----- Message -----  From: Rema Shaffer RN  Sent: 10/19/2021   2:24 PM CDT  To: MD Laura Pitts,  This patient would like his trimix changed so that he gets better erections. He says his erections  are   8/10 with growth but is not able to penetrate. He uses trimix # 8   We can increase to Trimix # 4  Let me know if you are okay    Rema

## 2021-11-10 ENCOUNTER — OFFICE VISIT (OUTPATIENT)
Dept: FAMILY MEDICINE | Facility: CLINIC | Age: 76
End: 2021-11-10
Payer: MEDICARE

## 2021-11-10 VITALS
DIASTOLIC BLOOD PRESSURE: 66 MMHG | OXYGEN SATURATION: 97 % | SYSTOLIC BLOOD PRESSURE: 124 MMHG | HEART RATE: 50 BPM | WEIGHT: 181 LBS | BODY MASS INDEX: 26.81 KG/M2 | HEIGHT: 69 IN | TEMPERATURE: 98 F

## 2021-11-10 DIAGNOSIS — I10 PRIMARY HYPERTENSION: ICD-10-CM

## 2021-11-10 DIAGNOSIS — I48.0 PAROXYSMAL ATRIAL FIBRILLATION (H): ICD-10-CM

## 2021-11-10 DIAGNOSIS — M79.661 PAIN OF RIGHT LOWER LEG: Primary | ICD-10-CM

## 2021-11-10 DIAGNOSIS — M54.41 CHRONIC BILATERAL LOW BACK PAIN WITH RIGHT-SIDED SCIATICA: ICD-10-CM

## 2021-11-10 DIAGNOSIS — G89.29 CHRONIC BILATERAL LOW BACK PAIN WITH RIGHT-SIDED SCIATICA: ICD-10-CM

## 2021-11-10 LAB
ERYTHROCYTE [DISTWIDTH] IN BLOOD BY AUTOMATED COUNT: 13.2 % (ref 10–15)
HCT VFR BLD AUTO: 41.7 % (ref 40–53)
HGB BLD-MCNC: 14.2 G/DL (ref 13.3–17.7)
MCH RBC QN AUTO: 32.5 PG (ref 26.5–33)
MCHC RBC AUTO-ENTMCNC: 34.1 G/DL (ref 31.5–36.5)
MCV RBC AUTO: 95 FL (ref 78–100)
PLATELET # BLD AUTO: 231 10E3/UL (ref 150–450)
RBC # BLD AUTO: 4.37 10E6/UL (ref 4.4–5.9)
WBC # BLD AUTO: 7 10E3/UL (ref 4–11)

## 2021-11-10 PROCEDURE — 80053 COMPREHEN METABOLIC PANEL: CPT | Performed by: INTERNAL MEDICINE

## 2021-11-10 PROCEDURE — 85027 COMPLETE CBC AUTOMATED: CPT | Performed by: INTERNAL MEDICINE

## 2021-11-10 PROCEDURE — 99214 OFFICE O/P EST MOD 30 MIN: CPT | Performed by: INTERNAL MEDICINE

## 2021-11-10 PROCEDURE — 36415 COLL VENOUS BLD VENIPUNCTURE: CPT | Performed by: INTERNAL MEDICINE

## 2021-11-10 PROCEDURE — 82550 ASSAY OF CK (CPK): CPT | Performed by: INTERNAL MEDICINE

## 2021-11-10 RX ORDER — METHYLPREDNISOLONE 4 MG
TABLET, DOSE PACK ORAL
Qty: 21 TABLET | Refills: 0 | Status: SHIPPED | OUTPATIENT
Start: 2021-11-10 | End: 2022-05-27

## 2021-11-10 ASSESSMENT — ENCOUNTER SYMPTOMS: LEG PAIN: 1

## 2021-11-10 ASSESSMENT — MIFFLIN-ST. JEOR: SCORE: 1541.39

## 2021-11-10 NOTE — PROGRESS NOTES
"  Assessment & Plan   Problem List Items Addressed This Visit        Circulatory    Paroxysmal atrial fibrillation (H)    Hypertension      Other Visit Diagnoses     Pain of right lower leg    -  Primary    Relevant Medications    methylPREDNISolone (MEDROL DOSEPAK) 4 MG tablet therapy pack    Other Relevant Orders    Comprehensive metabolic panel (BMP + Alb, Alk Phos, ALT, AST, Total. Bili, TP) (Completed)    CBC with platelets (Completed)    CK total (Completed)    Chronic bilateral low back pain with right-sided sciatica        Relevant Medications    methylPREDNISolone (MEDROL DOSEPAK) 4 MG tablet therapy pack         Possible L4 radiculopathy.  Possible musculoskeletal injury.  Gently do exercise activities so that not to exacerbate the pain.  Avoid exercise activities that causes the pain.  Gentle stretching to the right leg lower muscles.  Will avoid nonsteroidals for now due to being on Eliquis.  We will give a low-dose of steroids Medrol pack for 6 days.  Discussed on medication side effects and intake.  Tylenol as needed for pain as well if symptoms are not better the next 2 to 3 days I would advise that he see sports medicine.  Doubt this is DVT clinically there is no leg swelling or redness, muscles are very supple on exam of the calves muscles area really good, negative Homans' sign.  --And he is on chronic anticoagulation.  Advised him to do labs he is on flecainide and losartan will check potassium electrolytes and CK levels.  All questions answered.  Patient was walking with short steps due to discomfort.           BMI:   Estimated body mass index is 26.73 kg/m  as calculated from the following:    Height as of this encounter: 1.753 m (5' 9\").    Weight as of this encounter: 82.1 kg (181 lb).   Weight management plan: Discussed healthy diet and exercise guidelines    Work on weight loss  Regular exercise  See Patient Instructions    No follow-ups on file.    Edu Reyes MD  [facility] " "[facility] CAIN Soni is a 76 year old who presents for the following health issues   Leg Pain    History of Present Illness       He eats 4 or more servings of fruits and vegetables daily.He consumes 0 sweetened beverage(s) daily.He exercises with enough effort to increase his heart rate 20 to 29 minutes per day.  He exercises with enough effort to increase his heart rate 4 days per week.   He is taking medications regularly.     Patient presenting with history of far right lower extremity pain more on the anterior lateral shin area distal below the knee affecting his walking.  He experienced pain up to 7 out of 10.  He had low back pain he was seen by chiropractor back pain was ongoing for the last 4 weeks decreased his back pain Is better now.  He still not able to do some exercise activities such as walking on the treadmill due to the right lower extremity pain.  Denies any leg swelling denies any fall or trauma to the right leg or injury per se.  No other systemic complaints.  No weakness per se but the pain is affecting his ambulation.  No changes in GI or  pattern.      Review of Systems   Constitutional, HEENT, cardiovascular, pulmonary, gi and gu systems are negative, except as otherwise noted.      Objective    /66 (BP Location: Left arm, Patient Position: Chair, Cuff Size: Adult Regular)   Pulse 50   Temp 98  F (36.7  C) (Temporal)   Ht 1.753 m (5' 9\")   Wt 82.1 kg (181 lb)   SpO2 97%   BMI 26.73 kg/m    Body mass index is 26.73 kg/m .  Physical Exam   General appearance not in acute distress, alert oriented x3  Right lower extremity: No knee swelling or deformities.  Right distal lower extremity supple muscle, no tenderness on palpation of the calf muscles, some tenderness on the palpation of the right distal anterior medial chin muscles area, intact motor power of right lower extremity proximal and distal muscles negative leg raising sign bilateral.  No tenderness over the " right lower lumbar paravertebral muscles or vertebral spine processes.  Full range of motion of the right hip.  No evidence of cellulitis.  No evidence of swelling or redness or red streaks on the right lower extremity    Hospital Outpatient Visit on 08/20/2021   Component Date Value Ref Range Status     LVEF  08/20/2021 55-60%   Final

## 2021-11-11 LAB
ALBUMIN SERPL-MCNC: 3.6 G/DL (ref 3.4–5)
ALP SERPL-CCNC: 54 U/L (ref 40–150)
ALT SERPL W P-5'-P-CCNC: 23 U/L (ref 0–70)
ANION GAP SERPL CALCULATED.3IONS-SCNC: 2 MMOL/L (ref 3–14)
AST SERPL W P-5'-P-CCNC: 10 U/L (ref 0–45)
BILIRUB SERPL-MCNC: 0.4 MG/DL (ref 0.2–1.3)
BUN SERPL-MCNC: 21 MG/DL (ref 7–30)
CALCIUM SERPL-MCNC: 8.5 MG/DL (ref 8.5–10.1)
CHLORIDE BLD-SCNC: 105 MMOL/L (ref 94–109)
CK SERPL-CCNC: 58 U/L (ref 30–300)
CO2 SERPL-SCNC: 30 MMOL/L (ref 20–32)
CREAT SERPL-MCNC: 1.12 MG/DL (ref 0.66–1.25)
GFR SERPL CREATININE-BSD FRML MDRD: 63 ML/MIN/1.73M2
GLUCOSE BLD-MCNC: 88 MG/DL (ref 70–99)
POTASSIUM BLD-SCNC: 4.7 MMOL/L (ref 3.4–5.3)
PROT SERPL-MCNC: 7.2 G/DL (ref 6.8–8.8)
SODIUM SERPL-SCNC: 137 MMOL/L (ref 133–144)

## 2021-11-15 ENCOUNTER — MYC MEDICAL ADVICE (OUTPATIENT)
Dept: FAMILY MEDICINE | Facility: CLINIC | Age: 76
End: 2021-11-15
Payer: MEDICARE

## 2021-11-15 DIAGNOSIS — M79.661 PAIN OF RIGHT LOWER LEG: Primary | ICD-10-CM

## 2021-11-16 NOTE — TELEPHONE ENCOUNTER
See below, pt is almost finished with prednisone. Pain is improved but still present and pt asking for next steps at addressing pain    Please advise      Bryanna Wright RN  [facility]

## 2021-11-17 NOTE — TELEPHONE ENCOUNTER
Please see patient's mychart:    Pended referral for PT- knee pain. Please advise, and route back to triage to follow up with patient.    Kristy Peraza RN  [facility]

## 2021-11-18 RX ORDER — GABAPENTIN 100 MG/1
100 CAPSULE ORAL 3 TIMES DAILY
Qty: 90 CAPSULE | Refills: 1 | Status: SHIPPED | OUTPATIENT
Start: 2021-11-18 | End: 2022-05-27

## 2021-11-18 NOTE — TELEPHONE ENCOUNTER
See mc reply.    Pended the pharmacy for Gabapentin order.  Please place Sports med referral then route back.    Alida Mar, RN  [facility] RN Triage Team

## 2021-11-22 ENCOUNTER — OFFICE VISIT (OUTPATIENT)
Dept: NEUROSURGERY | Facility: CLINIC | Age: 76
End: 2021-11-22
Attending: PHYSICIAN ASSISTANT
Payer: MEDICARE

## 2021-11-22 VITALS
DIASTOLIC BLOOD PRESSURE: 70 MMHG | OXYGEN SATURATION: 98 % | HEART RATE: 63 BPM | WEIGHT: 181 LBS | BODY MASS INDEX: 26.81 KG/M2 | SYSTOLIC BLOOD PRESSURE: 131 MMHG | HEIGHT: 69 IN

## 2021-11-22 DIAGNOSIS — M79.661 PAIN OF RIGHT LOWER LEG: ICD-10-CM

## 2021-11-22 PROCEDURE — 99203 OFFICE O/P NEW LOW 30 MIN: CPT | Performed by: PHYSICIAN ASSISTANT

## 2021-11-22 PROCEDURE — G0463 HOSPITAL OUTPT CLINIC VISIT: HCPCS

## 2021-11-22 ASSESSMENT — PAIN SCALES - GENERAL: PAINLEVEL: MILD PAIN (3)

## 2021-11-22 ASSESSMENT — MIFFLIN-ST. JEOR: SCORE: 1541.39

## 2021-11-22 NOTE — PROGRESS NOTES
Neurosurgery Consult    HPI    Mr. Ram is a 76-year-old male with a 10-year history of intermittent low back pain that happens about once a year.  Recently he is having pain in his right anterior lower leg.  This has been going on for about a month now.  Is been trying chiropractic care for the last month or so without much improvement.  He has not had a recent lumbar MRI.  He denies any numbness or weakness.  Denies any bowel or bladder symptoms are new or different.  Denies any increased pain with coughing or sneezing.  Is worse with standing and walking and twisting.    Medical history  Obstructive sleep apnea  GERD  Atrial fibrillation on anticoagulation    Social history  Retired       B/P: 131/70, T: Data Unavailable, P: 63, R: Data Unavailable       Exam    Alert and oriented no acute distress  Bilateral lower extremities with 5/5 strength  Reflexes 2+ patella/ankle  Negative straight leg raise bilaterally  Negative ankle clonus negative Babinski bilaterally  Lumbar spine nontender to palpation  Able to stand on heels and toes  Gait is normal    Imaging    No imaging to review    Assessment    Neck pain  Right leg pain    Plan:      I recommend the patient obtain a lumbar MRI  Without contrast, given he is not improving with conservative therapies and I will follow up with him with results when they are available.    Addendum 11/23/2021    Lumbar MRI reveals right paracentral disc protrusion and lateral recess stenosis at L4-5 with impingement on traversing right L5 nerve.  I contacted patient and reviewed the results with him.  He would like to begin a trial of physical therapy first before obtaining any injection, certainly reasonable physical therapy order was placed he can follow-up with us as needed.      Total time of 30 minutes spent with the patient today in counseling and coordination of care.

## 2021-11-22 NOTE — NURSING NOTE
"Zachariah Ram is a 76 year old male who presents for:  Chief Complaint   Patient presents with     Consult     Pain of right leg, possible sciatica        Initial Vitals:  /70   Pulse 63   Ht 5' 9\" (1.753 m)   Wt 181 lb (82.1 kg)   SpO2 98%   BMI 26.73 kg/m   Estimated body mass index is 26.73 kg/m  as calculated from the following:    Height as of this encounter: 5' 9\" (1.753 m).    Weight as of this encounter: 181 lb (82.1 kg).. Body surface area is 2 meters squared. BP completed using cuff size: regular  Mild Pain (3)    Gurwinder Dawson MA    "

## 2021-11-22 NOTE — LETTER
11/22/2021         RE: Zachariah Ram  [address]  [address]  [place] [address]        Dear Colleague,    Thank you for referring your patient, Zachariah Ram, to the [facility] [place]. Please see a copy of my visit note below.    Neurosurgery Consult    HPI    Mr. Ram is a 76-year-old male with a 10-year history of intermittent low back pain that happens about once a year.  Recently he is having pain in his right anterior lower leg.  This has been going on for about a month now.  Is been trying chiropractic care for the last month or so without much improvement.  He has not had a recent lumbar MRI.  He denies any numbness or weakness.  Denies any bowel or bladder symptoms are new or different.  Denies any increased pain with coughing or sneezing.  Is worse with standing and walking and twisting.    Medical history  Obstructive sleep apnea  GERD  Atrial fibrillation on anticoagulation    Social history  Retired       B/P: 131/70, T: Data Unavailable, P: 63, R: Data Unavailable       Exam    Alert and oriented no acute distress  Bilateral lower extremities with 5/5 strength  Reflexes 2+ patella/ankle  Negative straight leg raise bilaterally  Negative ankle clonus negative Babinski bilaterally  Lumbar spine nontender to palpation  Able to stand on heels and toes  Gait is normal    Imaging    No imaging to review    Assessment    Neck pain  Right leg pain    Plan:      I recommend the patient obtain a lumbar MRI  Without contrast, given he is not improving with conservative therapies and I will follow up with him with results when they are available.    Total time of 30 minutes spent with the patient today in counseling and coordination of care.      Again, thank you for allowing me to participate in the care of your patient.        Sincerely,        Mike Andersen PA-C

## 2021-11-23 ENCOUNTER — ANCILLARY PROCEDURE (OUTPATIENT)
Dept: MRI IMAGING | Facility: CLINIC | Age: 76
End: 2021-11-23
Attending: PHYSICIAN ASSISTANT
Payer: MEDICARE

## 2021-11-23 DIAGNOSIS — M79.661 PAIN OF RIGHT LOWER LEG: ICD-10-CM

## 2021-11-23 PROCEDURE — 72148 MRI LUMBAR SPINE W/O DYE: CPT | Mod: ME | Performed by: RADIOLOGY

## 2021-11-23 PROCEDURE — G1004 CDSM NDSC: HCPCS | Performed by: RADIOLOGY

## 2021-11-29 ENCOUNTER — THERAPY VISIT (OUTPATIENT)
Dept: PHYSICAL THERAPY | Facility: CLINIC | Age: 76
End: 2021-11-29
Attending: PHYSICIAN ASSISTANT
Payer: MEDICARE

## 2021-11-29 DIAGNOSIS — M79.661 PAIN OF RIGHT LOWER LEG: ICD-10-CM

## 2021-11-29 DIAGNOSIS — G89.29 CHRONIC MIDLINE LOW BACK PAIN WITH RIGHT-SIDED SCIATICA: ICD-10-CM

## 2021-11-29 DIAGNOSIS — M54.41 CHRONIC MIDLINE LOW BACK PAIN WITH RIGHT-SIDED SCIATICA: ICD-10-CM

## 2021-11-29 PROCEDURE — 97161 PT EVAL LOW COMPLEX 20 MIN: CPT | Mod: GP | Performed by: PHYSICAL THERAPIST

## 2021-11-29 PROCEDURE — 97110 THERAPEUTIC EXERCISES: CPT | Mod: GP | Performed by: PHYSICAL THERAPIST

## 2021-11-29 NOTE — PROGRESS NOTES
"Physical Therapy Initial Evaluation  Subjective:    Patient Health History  Zachariah Ram being seen for MRI shows pinched nerve at L4-L5 with pain down right leg.  Need a stretching routine to reduce the pain.  I am 76 years old and in relatively good physical shape for my age.  I want to get rid of this pain, so I can resume treadmill and hiking activities.     Problem began: 10/10/2021.   Problem occurred: For 10 years, I throw my back out once a year.  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  This year, no particular event triggered the event, and this is the first time pain down the right leg.                                              Precautions/Restrictions/MD instructions: Low back pain/pain of R leg  Order Date: 11/23/2021    Therapist Assessment: Zachariah Ram is a 76 year old male patient presenting to Physical Therapy with low back pain and R leg pain. Patient demonstrates decreased ROM, strength, and radicular pain. Signs and symptoms are consistent with diagnosis. These impairments limit their ability to ambulate, participate in exercise, and do household activities. Skilled PT services are necessary in order to reduce impairments and improve independent function.    Subjective:   Chief Complaint: Pt reports low back pain what started about 2 months ago, and then had onset of R leg pain about 6 weeks ago.  He has back pain every year that is treated by chiropractor, but has never had leg pain.  Chiropractic did not seem to help symptoms.  Pt has tried may things including chiro, ice, hear, tylenold, voltaren, and treadmill walking.  He has pain with standing, twisting, getting out of bed are the worst (first few steps in the morning are the worse.  Pain is in the R lateral calf to ankle.  He does massage, put pain cream, and stretch in the morning which makes it a bit more tolerable to walk.  It was always painful in the R Low back and describes having \"old " "man syndrome\" where he was leaned forward and to the left.  Worse with R SB and rotation.  Pt does try to walk with his wife's walker and does feel better.    Associated symptoms:   Onset date: about 6 weeks ago  PASCALE: traumatic vs insidious - insidious   Pain severity: 2/10 at rest; 5/10 current, 7/10 worst (inadvertently twist)  Location of pain: R lateral calf  Quality of Pain: \"less than sharp, more than dull\" if he twists he gets a sharp pain   Better: ice, volataren, joint cream  Worse:  Twisting  Time of day: morning is worst, better after exercises/stationary bike  Progression of Symptoms since onset:  Since onset, these symptoms are variable  Previous treatment: has included chiropractic; pt did not see a great effect like he has in previous years  Current Functional Status: unable to hike, limits his exercise program  Previous Functional Status:  fully functional prior to pain onset/injury.  Outcome measure: Oswestry, Niurka      General health as reported by patient: good.    PMH:    Surgical history/trauma:  None. He denies any significant current illness or recent hospital admissions. He denies any regional implanted devices.  Imaging:   Medications: Prednisone about 2 weeks, not much help; gabapentin is not helping too much    Occupation: Retired Job duties:   Current exercise regimen/Recreational activities: Exercise routine daily including treadmill, stretching, strengthening  Barriers to treatment: none    Red Flags: (Bold when present) - reviewed the following and denies  Calf pain/swelling/warmth      Patient's Goal(s): To get back to hiking without pain                      Objective:  System         Lumbar/SI Evaluation  ROM:    AROM Lumbar:   Flexion:          Min wyman  Ext:                    Min wyman   Side Bend:        Left:  Min wyman    Right:  Min wyman  Rotation:           Left:     Right:   Side Glide:        Left:     Right:           Lumbar Myotomes:    T12-L3 (Hip Flex):  Left: 5-    Right: " 4+  L2-4 (Quads):  Left:  5-    Right:  5-  L4 (Ankle DF):  Left:  5-    Right:  5-  L5 (Great Toe Ext): Left: 5-    Right: 5-   S1 (Toe Raise):  Left: 5-    Right: 5-        Neural Tension/Mobility:      Left side:Slump  negative.   Right side:   Slump and SLR positive.  Lumbar Palpation:      Tenderness present at Right: Piriformis; PSIS; Gluteus Medius and Hamstrings        SI joint/Sacrum:              Sacral conclusion right:  Sacral torsion                                             General     ROS    Assessment/Plan:    Patient is a 76 year old male with lumbar complaints.    Patient has the following significant findings with corresponding treatment plan.                Diagnosis 1:  Low back pain with sciatica  Pain -  manual therapy, self management, education and home program  Decreased ROM/flexibility - manual therapy and therapeutic exercise  Decreased joint mobility - manual therapy and therapeutic exercise  Impaired gait - gait training  Impaired muscle performance - neuro re-education  Decreased function - therapeutic activities    Therapy Evaluation Codes:   1) History comprised of:   Personal factors that impact the plan of care:      None.    Comorbidity factors that impact the plan of care are:      None.     Medications impacting care: None.  2) Examination of Body Systems comprised of:   Body structures and functions that impact the plan of care:      Lumbar spine.   Activity limitations that impact the plan of care are:      Bending, Lifting, Reading/Computer work, Sitting, Sports, Stairs, Standing and Walking.  3) Clinical presentation characteristics are:   Stable/Uncomplicated.  4) Decision-Making    Low complexity using standardized patient assessment instrument and/or measureable assessment of functional outcome.  Cumulative Therapy Evaluation is: Low complexity.    Previous and current functional limitations:  (See Goal Flow Sheet for this information)    Short term and Long term goals:  (See Goal Flow Sheet for this information)     Communication ability:  Patient appears to be able to clearly communicate and understand verbal and written communication and follow directions correctly.  Treatment Explanation - The following has been discussed with the patient:   RX ordered/plan of care  Anticipated outcomes  Possible risks and side effects  This patient would benefit from PT intervention to resume normal activities.   Rehab potential is excellent.    Frequency:  1 X week, once daily  Duration:  for 3 weeks tapering to 2 X a month over 5 weeks  Discharge Plan:  Achieve all LTG.  Independent in home treatment program.  Reach maximal therapeutic benefit.    Please refer to the daily flowsheet for treatment today, total treatment time and time spent performing 1:1 timed codes.

## 2021-11-30 PROBLEM — M79.661 PAIN OF RIGHT LOWER LEG: Status: ACTIVE | Noted: 2021-11-30

## 2021-11-30 PROBLEM — M54.41 MIDLINE LOW BACK PAIN WITH RIGHT-SIDED SCIATICA: Status: ACTIVE | Noted: 2021-11-30

## 2021-12-01 NOTE — PROGRESS NOTES
[facility]    OUTPATIENT Physical Therapy ORTHOPEDIC EVALUATION  PLAN OF TREATMENT FOR OUTPATIENT REHABILITATION  (COMPLETE FOR INITIAL CLAIMS ONLY)  Patient's Last Name, First Name, M.I.  YOB: 1945  Zachariah Ram    Provider s Name:  [facility]   Medical Record No.  [id number]   Start of Care Date:  11/29/21   Onset Date:   11/23/21 (MD referral date)   Type:     _X__PT   ___OT Medical Diagnosis:    Encounter Diagnoses   Name Primary?     Pain of right lower leg      Chronic midline low back pain with right-sided sciatica         Treatment Diagnosis:  Low back pain with radicular symptoms        Goals:     11/29/21 0500   Body Part   Goals listed below are for low back pain   Goal #1   Goal #1 ambulation   Previous Functional Level No restrictions   Performance Level Pt hikes in [place] frequently   Current Functional Level Blocks patient can walk   Performance Level 10 blocks with increase in pain   STG Target Performance Miles patient will be able to  walk   Performance Level 1 mile with no radicular pain in legs   Rationale for safe outdoor household ambulation   Due Date 12/27/21    LTG Target Performance Miles patient will be able to  walk;on uneven terrain; on sharp inclines/declines   Performance Level 2 miles to get back to hiking in [place]   Rationale to promote a healthy and active lifestyle   Due Date 01/24/22       Therapy Frequency:  1x/week for 3 weeks, 2x/month for 5 weeks  Predicted Duration of Therapy Intervention:  8 weeks    Manuela Kwong, PT                 I CERTIFY THE NEED FOR THESE SERVICES FURNISHED UNDER        THIS PLAN OF TREATMENT AND WHILE UNDER MY CARE     (Physician attestation of this document indicates review and certification of the therapy plan).                       Certification Date From:  11/29/21   Certification Date To:   01/24/22    Referring Provider:  Mike Andersen    Initial Assessment        See Epic Evaluation SOC Date: 11/29/21

## 2021-12-02 ENCOUNTER — TELEPHONE (OUTPATIENT)
Dept: NEUROSURGERY | Facility: CLINIC | Age: 76
End: 2021-12-02
Payer: MEDICARE

## 2021-12-02 DIAGNOSIS — M51.26 LUMBAR DISC HERNIATION: ICD-10-CM

## 2021-12-02 DIAGNOSIS — M54.16 LUMBAR RADICULITIS: Primary | ICD-10-CM

## 2021-12-02 NOTE — TELEPHONE ENCOUNTER
I have forwarded this to Dr. Giles who can schedule the patient.    Injection request.     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     Thanks

## 2021-12-02 NOTE — TELEPHONE ENCOUNTER
Reason for call: Pt called to request an order for another injection. Please call him back at [phone]. Thank you.

## 2021-12-02 NOTE — TELEPHONE ENCOUNTER
Called and updated patient he should be hearing from Dr. Giles's office to schedule his injection. Advised patient to contact our clinic if pain persists 2 weeks post injection. Understanding was verbalized.     Patient said he likes to utilize mychart but Mike Andersen PA-C was not listed under one of his providers that he could send a message to. Writer informed patient I would try to find out and get back to him.

## 2021-12-03 ENCOUNTER — TELEPHONE (OUTPATIENT)
Dept: NEUROSURGERY | Facility: CLINIC | Age: 76
End: 2021-12-03
Payer: MEDICARE

## 2021-12-03 NOTE — TELEPHONE ENCOUNTER
Zachariah would like to take up Mike's offer to schedule an injection. He would like it today. I don't see an order. Please reach him at [phone]

## 2021-12-03 NOTE — TELEPHONE ENCOUNTER
Sent patient [employer] message with below info:     Need assistance with [employer]?  Contact [employer] at [phone]

## 2021-12-03 NOTE — TELEPHONE ENCOUNTER
This can be faxed to [phone] Attn: Cheyanne/ Dr Giles.    Cheyanne Hidalgo, RN   Neurology Care Coordinator

## 2021-12-03 NOTE — TELEPHONE ENCOUNTER
Dear Dr. Toure and Yamileth Gayle PA-C    Our mutual patient, Zachariah Ram 1945 is requesting a procedure: Lumbar 4 and 5 Transforaminal Epidural Steroid I injection. Because he is on Eliquis , we will need clearance to stop this medication prior to the procedure. Restarting, typically 24-hours after the injection. If necessary, the patient can be bridged. Lovenox should be stopped 24 hours prior to the injection.     We will not schedule the patient until we have clearance.     Thank you,     [facility]      Please choose from the following options and advise on when you would like this stopped (pre-injection) and restarted (post-injection).         ___ This patient MAY discontinue the anticoagulation/antiplatelet medication, as described above.      ___ This patient MAY NOT discontinue their anticoagulation/antiplatelet medication.      ___ This patient DOES require bridging with other medications.      ___ This patient DOES NOT require bridging with other medications.           _______________________________________________   ___/___/___  SIGNATURE         DATE

## 2021-12-03 NOTE — TELEPHONE ENCOUNTER
Patient was provided with my chart help line in a [employer] message from writer earlier today.     As far as the injection MG staff is handling it from here and will touch base with patient. Please refer to the telephone encounter from 12/2 for further details.

## 2021-12-03 NOTE — TELEPHONE ENCOUNTER
Zachariah called again to request that Mike be added to his MyChart providers to speed up communication.

## 2021-12-06 NOTE — TELEPHONE ENCOUNTER
Received signed form from Dr. Toure. Faxed to Cheyanne Hidalgo, neurology care coordinator to fax, [phone].

## 2021-12-07 NOTE — TELEPHONE ENCOUNTER
Dr Giles, the letter below does not indicate how many days to hold the Eliquis prior to the injection. Would it be ok to advise him to stop 3 days prior to injection and restart 24 hours after?    Cheyanne Hidalgo RN   Neurology Care Coordinator

## 2021-12-10 ENCOUNTER — THERAPY VISIT (OUTPATIENT)
Dept: PHYSICAL THERAPY | Facility: CLINIC | Age: 76
End: 2021-12-10
Payer: MEDICARE

## 2021-12-10 DIAGNOSIS — G89.29 CHRONIC MIDLINE LOW BACK PAIN WITH RIGHT-SIDED SCIATICA: ICD-10-CM

## 2021-12-10 DIAGNOSIS — M54.41 CHRONIC MIDLINE LOW BACK PAIN WITH RIGHT-SIDED SCIATICA: ICD-10-CM

## 2021-12-10 DIAGNOSIS — M79.661 PAIN OF RIGHT LOWER LEG: ICD-10-CM

## 2021-12-10 PROCEDURE — 97110 THERAPEUTIC EXERCISES: CPT | Mod: GP

## 2021-12-10 NOTE — TELEPHONE ENCOUNTER
Attempted to reach out to patient, no answer. Left voice message for patient to call clinic back to further discuss.     Left message for patient to try this number to make appointment.   Called Dr Giles's office at [phone]  Told to follow up on Monday if still  Unable to make appointment

## 2021-12-14 ENCOUNTER — HOSPITAL ENCOUNTER (OUTPATIENT)
Facility: AMBULATORY SURGERY CENTER | Age: 76
End: 2021-12-14
Attending: PHYSICAL MEDICINE & REHABILITATION
Payer: MEDICARE

## 2021-12-14 DIAGNOSIS — M51.26 LUMBAR DISC HERNIATION: ICD-10-CM

## 2021-12-14 DIAGNOSIS — M54.16 LUMBAR RADICULITIS: ICD-10-CM

## 2021-12-14 DIAGNOSIS — Z11.59 ENCOUNTER FOR SCREENING FOR OTHER VIRAL DISEASES: ICD-10-CM

## 2021-12-14 NOTE — TELEPHONE ENCOUNTER
Dr. Giles's surgery scheduler contacted patient. Patient is scheduled for his injection on 12/31/21.

## 2021-12-17 ENCOUNTER — THERAPY VISIT (OUTPATIENT)
Dept: PHYSICAL THERAPY | Facility: CLINIC | Age: 76
End: 2021-12-17
Payer: MEDICARE

## 2021-12-17 DIAGNOSIS — G89.29 CHRONIC MIDLINE LOW BACK PAIN WITH RIGHT-SIDED SCIATICA: ICD-10-CM

## 2021-12-17 DIAGNOSIS — M54.41 CHRONIC MIDLINE LOW BACK PAIN WITH RIGHT-SIDED SCIATICA: ICD-10-CM

## 2021-12-17 DIAGNOSIS — M79.661 PAIN OF RIGHT LOWER LEG: ICD-10-CM

## 2021-12-17 PROCEDURE — 97140 MANUAL THERAPY 1/> REGIONS: CPT | Mod: GP | Performed by: PHYSICAL THERAPIST

## 2021-12-17 PROCEDURE — 97530 THERAPEUTIC ACTIVITIES: CPT | Mod: GP | Performed by: PHYSICAL THERAPIST

## 2021-12-17 PROCEDURE — 97110 THERAPEUTIC EXERCISES: CPT | Mod: GP | Performed by: PHYSICAL THERAPIST

## 2021-12-17 NOTE — PROGRESS NOTES
Subjective:  HPI  Physical Exam                    Objective:  System    Physical Exam    General     ROS    Assessment/Plan:    SUBJECTIVE  Subjective changes as noted by pt:   Pt. reports being about 50% improved from initial onset. He is still limited in walking and standing tolerance. Sitting causes no pain. Still scheduled for an epidural injection 12-31.   Current pain level:  3/10   Changes in function:  Yes (See Goal flowsheet attached for changes in current functional level)     Adverse reaction to treatment or activity:  None    OBJECTIVE  Changes in objective findings:  Tests - slump mildly (+) R; SLR (+) R     ASSESSMENT  Zachariah continues to require intervention to meet STG and LTG's: PT  Patient is progressing as expected.  Response to therapy has shown an improvement in  pain level, radicular symptoms and ROM   Progress made towards STG/LTG?  Yes (See Goal flowsheet attached for updates on achievement of STG and LTG)    PLAN  Current treatment program is being advanced to more complex exercises.    PTA/ATC plan:  N/A    Please refer to the daily flowsheet for treatment today, total treatment time and time spent performing 1:1 timed codes.

## 2021-12-28 ENCOUNTER — TRANSFERRED RECORDS (OUTPATIENT)
Dept: HEALTH INFORMATION MANAGEMENT | Facility: CLINIC | Age: 76
End: 2021-12-28

## 2021-12-28 ENCOUNTER — THERAPY VISIT (OUTPATIENT)
Dept: PHYSICAL THERAPY | Facility: CLINIC | Age: 76
End: 2021-12-28
Payer: MEDICARE

## 2021-12-28 DIAGNOSIS — M54.41 CHRONIC MIDLINE LOW BACK PAIN WITH RIGHT-SIDED SCIATICA: ICD-10-CM

## 2021-12-28 DIAGNOSIS — M79.661 PAIN OF RIGHT LOWER LEG: ICD-10-CM

## 2021-12-28 DIAGNOSIS — G89.29 CHRONIC MIDLINE LOW BACK PAIN WITH RIGHT-SIDED SCIATICA: ICD-10-CM

## 2021-12-28 PROCEDURE — 97112 NEUROMUSCULAR REEDUCATION: CPT | Mod: GP | Performed by: PHYSICAL THERAPIST

## 2021-12-28 PROCEDURE — 97140 MANUAL THERAPY 1/> REGIONS: CPT | Mod: GP | Performed by: PHYSICAL THERAPIST

## 2021-12-28 PROCEDURE — 97110 THERAPEUTIC EXERCISES: CPT | Mod: GP | Performed by: PHYSICAL THERAPIST

## 2022-01-04 ENCOUNTER — TRANSFERRED RECORDS (OUTPATIENT)
Dept: HEALTH INFORMATION MANAGEMENT | Facility: CLINIC | Age: 77
End: 2022-01-04
Payer: MEDICARE

## 2022-01-10 ENCOUNTER — MYC MEDICAL ADVICE (OUTPATIENT)
Dept: NEUROSURGERY | Facility: CLINIC | Age: 77
End: 2022-01-10
Payer: MEDICARE

## 2022-01-10 ENCOUNTER — TELEPHONE (OUTPATIENT)
Dept: NEUROSURGERY | Facility: CLINIC | Age: 77
End: 2022-01-10
Payer: MEDICARE

## 2022-01-10 DIAGNOSIS — I48.92 ATRIAL FLUTTER, UNSPECIFIED TYPE (H): ICD-10-CM

## 2022-01-10 DIAGNOSIS — I48.0 PAROXYSMAL ATRIAL FIBRILLATION (H): ICD-10-CM

## 2022-01-10 RX ORDER — FLECAINIDE ACETATE 100 MG/1
100 TABLET ORAL 2 TIMES DAILY
Qty: 180 TABLET | Refills: 2 | Status: SHIPPED | OUTPATIENT
Start: 2022-01-10 | End: 2022-08-30

## 2022-01-10 NOTE — TELEPHONE ENCOUNTER
Patient has been doing PT with Mitzi per referral from Mike Andersen.  Patient would like to give follow up information via my-chart only Mike Andersen is not listed.

## 2022-01-25 ENCOUNTER — THERAPY VISIT (OUTPATIENT)
Dept: PHYSICAL THERAPY | Facility: CLINIC | Age: 77
End: 2022-01-25
Payer: MEDICARE

## 2022-01-25 DIAGNOSIS — G89.29 CHRONIC MIDLINE LOW BACK PAIN WITH RIGHT-SIDED SCIATICA: ICD-10-CM

## 2022-01-25 DIAGNOSIS — E03.9 HYPOTHYROIDISM, UNSPECIFIED TYPE: ICD-10-CM

## 2022-01-25 DIAGNOSIS — M54.41 CHRONIC MIDLINE LOW BACK PAIN WITH RIGHT-SIDED SCIATICA: ICD-10-CM

## 2022-01-25 DIAGNOSIS — M79.661 PAIN OF RIGHT LOWER LEG: ICD-10-CM

## 2022-01-25 PROCEDURE — 97110 THERAPEUTIC EXERCISES: CPT | Mod: GP | Performed by: PHYSICAL THERAPIST

## 2022-01-25 PROCEDURE — 97530 THERAPEUTIC ACTIVITIES: CPT | Mod: GP | Performed by: PHYSICAL THERAPIST

## 2022-01-25 RX ORDER — LEVOTHYROXINE SODIUM 100 UG/1
100 TABLET ORAL DAILY
Qty: 90 TABLET | Refills: 1 | Status: SHIPPED | OUTPATIENT
Start: 2022-01-25 | End: 2022-07-14

## 2022-01-25 NOTE — PROGRESS NOTES
Subjective:  HPI  Physical Exam                    Objective:  System    Physical Exam    General     ROS    Assessment/Plan:    DISCHARGE REPORT    Progress reporting period is from 11-29-21 to 1-25-22.       SUBJECTIVE  Subjective changes noted by patient:   Pt. has made very good progress in PT with now little to no c/o low back or R LE pain/sx's. Initially pt. could barely walk across the room. Now he is back to walking 2 miles plus without a problem. He has resumed all his normal daily activities without difficulty. Pt. plans to continue his HEP with no further PT visits planned unless increased problems arise.       Current pain level is 0/10.     Previous pain level was  6/10  .   Changes in function:  Yes (See Goal flowsheet attached for changes in current functional level)  Adverse reaction to treatment or activity: None    OBJECTIVE  Changes noted in objective findings:  ROM lumbar movts normal, no pain. Tests - slump (-); SLR (-); Fausto (-)     ASSESSMENT/PLAN  Updated problem list and treatment plan: Diagnosis 1:  R lumbar radiculopathy  Pain -  manual therapy, self management and education  Decreased ROM/flexibility - manual therapy and therapeutic exercise  Decreased strength - therapeutic exercise and therapeutic activities  Impaired muscle performance - neuro re-education  Decreased function - therapeutic activities  STG/LTGs have been met or progress has been made towards goals:  Yes (See Goal flow sheet completed today.)  Assessment of Progress: The patient has met all of their long term goals.  Self Management Plans:  Patient is independent in a home treatment program.  Patient is independent in self management of symptoms.  I have re-evaluated this patient and find that the nature, scope, duration and intensity of the therapy is appropriate for the medical condition of the patient.  Zachariah continues to require the following intervention to meet STG and LTG's:  PT intervention is no longer required  to meet STG/LTG.    Recommendations:  This patient is ready to be discharged from therapy and continue their home treatment program.    Please refer to the daily flowsheet for treatment today, total treatment time and time spent performing 1:1 timed codes.

## 2022-01-25 NOTE — TELEPHONE ENCOUNTER
Patient calls requesting levothyroxine refill.  TSH   Date Value Ref Range Status   06/15/2021 2.06 0.40 - 4.00 mU/L Final      Rx refilled per CPA with Dr. Reyes.    Melia Jaime, PharmD, [facility]  Medication Therapy Management Provider  Pager: [phone]

## 2022-02-12 VITALS
HEART RATE: 48 BPM | DIASTOLIC BLOOD PRESSURE: 64 MMHG | SYSTOLIC BLOOD PRESSURE: 132 MMHG | BODY MASS INDEX: 27.35 KG/M2 | WEIGHT: 191 LBS | HEIGHT: 70 IN

## 2022-02-15 PROBLEM — M79.661 PAIN OF RIGHT LOWER LEG: Status: RESOLVED | Noted: 2021-11-30 | Resolved: 2022-02-15

## 2022-02-15 PROBLEM — M54.41 MIDLINE LOW BACK PAIN WITH RIGHT-SIDED SCIATICA: Status: RESOLVED | Noted: 2021-11-30 | Resolved: 2022-02-15

## 2022-02-16 NOTE — TELEPHONE ENCOUNTER
---------------------  From: Felecia Villaseñor CMA (Phone Messages Pool ([id number]_[facility]))   Sent: 9/24/2020 3:16:56 PM CDT  Subject: No longer a pt here     Called at 1511 to let us know he moved to the [facility] and has established care elsewhere.

## 2022-02-20 ENCOUNTER — HEALTH MAINTENANCE LETTER (OUTPATIENT)
Age: 77
End: 2022-02-20

## 2022-02-22 ENCOUNTER — MYC MEDICAL ADVICE (OUTPATIENT)
Dept: FAMILY MEDICINE | Facility: CLINIC | Age: 77
End: 2022-02-22
Payer: MEDICARE

## 2022-02-23 ENCOUNTER — NURSE TRIAGE (OUTPATIENT)
Dept: FAMILY MEDICINE | Facility: CLINIC | Age: 77
End: 2022-02-23
Payer: MEDICARE

## 2022-02-23 DIAGNOSIS — M25.511 CHRONIC RIGHT SHOULDER PAIN: Primary | ICD-10-CM

## 2022-02-23 DIAGNOSIS — G89.29 CHRONIC RIGHT SHOULDER PAIN: Primary | ICD-10-CM

## 2022-02-23 NOTE — TELEPHONE ENCOUNTER
Please see mychart from patient.  Please reply to patient if appropriate, or route back to Triage with follow up needed.  Johny Evans RN

## 2022-03-24 ENCOUNTER — TRANSFERRED RECORDS (OUTPATIENT)
Dept: HEALTH INFORMATION MANAGEMENT | Facility: CLINIC | Age: 77
End: 2022-03-24
Payer: MEDICARE

## 2022-04-18 ENCOUNTER — MYC MEDICAL ADVICE (OUTPATIENT)
Dept: FAMILY MEDICINE | Facility: CLINIC | Age: 77
End: 2022-04-18
Payer: MEDICARE

## 2022-04-18 DIAGNOSIS — I10 BENIGN ESSENTIAL HYPERTENSION: ICD-10-CM

## 2022-04-20 RX ORDER — LOSARTAN POTASSIUM 50 MG/1
100 TABLET ORAL AT BEDTIME
Qty: 180 TABLET | Refills: 1 | Status: SHIPPED | OUTPATIENT
Start: 2022-04-20 | End: 2022-08-30

## 2022-04-20 NOTE — TELEPHONE ENCOUNTER
Prescription approved per [facility] Refill Protocol.  Leslye HERRMANN, Triage RN  [facility]

## 2022-04-25 ENCOUNTER — MYC MEDICAL ADVICE (OUTPATIENT)
Dept: UROLOGY | Facility: CLINIC | Age: 77
End: 2022-04-25
Payer: MEDICARE

## 2022-04-25 DIAGNOSIS — C61 PROSTATE CANCER (H): Primary | ICD-10-CM

## 2022-04-29 NOTE — TELEPHONE ENCOUNTER
Katherine Amador, RN  You 1 hour ago (1:03 PM)     JR    Ordered and responded to patient.    Message text       You  Katherine Amador RN 3 days ago     ED    May we order this? Patient saw Dr Sanchez in 2/2021 and had a PSA ordered under us 8/2021

## 2022-05-25 ASSESSMENT — ENCOUNTER SYMPTOMS
HEADACHES: 0
PARESTHESIAS: 0
DIARRHEA: 0
FREQUENCY: 0
CHILLS: 0
DYSURIA: 0
NERVOUS/ANXIOUS: 0
COUGH: 0
ABDOMINAL PAIN: 0
ARTHRALGIAS: 1
HEMATURIA: 0
HEARTBURN: 0
MYALGIAS: 0
NAUSEA: 0
SHORTNESS OF BREATH: 0
HEMATOCHEZIA: 0
EYE PAIN: 0
SORE THROAT: 0
WEAKNESS: 0
CONSTIPATION: 0
PALPITATIONS: 0
FEVER: 0
DIZZINESS: 0
JOINT SWELLING: 0

## 2022-05-25 ASSESSMENT — ACTIVITIES OF DAILY LIVING (ADL): CURRENT_FUNCTION: NO ASSISTANCE NEEDED

## 2022-05-27 ENCOUNTER — OFFICE VISIT (OUTPATIENT)
Dept: FAMILY MEDICINE | Facility: CLINIC | Age: 77
End: 2022-05-27
Payer: MEDICARE

## 2022-05-27 VITALS
BODY MASS INDEX: 26.1 KG/M2 | OXYGEN SATURATION: 97 % | WEIGHT: 182.3 LBS | HEIGHT: 70 IN | RESPIRATION RATE: 16 BRPM | HEART RATE: 62 BPM | DIASTOLIC BLOOD PRESSURE: 77 MMHG | SYSTOLIC BLOOD PRESSURE: 136 MMHG | TEMPERATURE: 97.9 F

## 2022-05-27 DIAGNOSIS — Z13.220 SCREENING FOR HYPERLIPIDEMIA: ICD-10-CM

## 2022-05-27 DIAGNOSIS — Z11.59 NEED FOR HEPATITIS C SCREENING TEST: ICD-10-CM

## 2022-05-27 DIAGNOSIS — Z00.00 ROUTINE HISTORY AND PHYSICAL EXAMINATION OF ADULT: Primary | ICD-10-CM

## 2022-05-27 DIAGNOSIS — E03.9 HYPOTHYROIDISM, UNSPECIFIED TYPE: ICD-10-CM

## 2022-05-27 DIAGNOSIS — Z98.890 HISTORY OF MELANOMA EXCISION: ICD-10-CM

## 2022-05-27 DIAGNOSIS — E78.5 DYSLIPIDEMIA: ICD-10-CM

## 2022-05-27 DIAGNOSIS — I48.0 PAROXYSMAL ATRIAL FIBRILLATION (H): ICD-10-CM

## 2022-05-27 DIAGNOSIS — Z85.820 HISTORY OF MELANOMA EXCISION: ICD-10-CM

## 2022-05-27 DIAGNOSIS — I10 PRIMARY HYPERTENSION: ICD-10-CM

## 2022-05-27 DIAGNOSIS — R13.10 DYSPHAGIA, UNSPECIFIED TYPE: ICD-10-CM

## 2022-05-27 DIAGNOSIS — G89.29 CHRONIC PAIN OF LEFT KNEE: ICD-10-CM

## 2022-05-27 DIAGNOSIS — M25.562 CHRONIC PAIN OF LEFT KNEE: ICD-10-CM

## 2022-05-27 PROBLEM — C43.9 LENTIGO MALIGNA MELANOMA (H): Status: ACTIVE | Noted: 2022-05-27

## 2022-05-27 PROBLEM — C43.9 LENTIGO MALIGNA MELANOMA (H): Status: RESOLVED | Noted: 2022-05-27 | Resolved: 2022-05-27

## 2022-05-27 LAB
ERYTHROCYTE [DISTWIDTH] IN BLOOD BY AUTOMATED COUNT: 13 % (ref 10–15)
HCT VFR BLD AUTO: 39.9 % (ref 40–53)
HGB BLD-MCNC: 13 G/DL (ref 13.3–17.7)
MCH RBC QN AUTO: 30.4 PG (ref 26.5–33)
MCHC RBC AUTO-ENTMCNC: 32.6 G/DL (ref 31.5–36.5)
MCV RBC AUTO: 93 FL (ref 78–100)
PLATELET # BLD AUTO: 275 10E3/UL (ref 150–450)
RBC # BLD AUTO: 4.27 10E6/UL (ref 4.4–5.9)
WBC # BLD AUTO: 6.2 10E3/UL (ref 4–11)

## 2022-05-27 PROCEDURE — G0439 PPPS, SUBSEQ VISIT: HCPCS | Performed by: INTERNAL MEDICINE

## 2022-05-27 PROCEDURE — 36415 COLL VENOUS BLD VENIPUNCTURE: CPT | Performed by: INTERNAL MEDICINE

## 2022-05-27 PROCEDURE — 84443 ASSAY THYROID STIM HORMONE: CPT | Performed by: INTERNAL MEDICINE

## 2022-05-27 PROCEDURE — 82728 ASSAY OF FERRITIN: CPT | Performed by: INTERNAL MEDICINE

## 2022-05-27 PROCEDURE — 85027 COMPLETE CBC AUTOMATED: CPT | Performed by: INTERNAL MEDICINE

## 2022-05-27 PROCEDURE — 82607 VITAMIN B-12: CPT | Performed by: INTERNAL MEDICINE

## 2022-05-27 PROCEDURE — 80053 COMPREHEN METABOLIC PANEL: CPT | Performed by: INTERNAL MEDICINE

## 2022-05-27 PROCEDURE — 99214 OFFICE O/P EST MOD 30 MIN: CPT | Mod: 25 | Performed by: INTERNAL MEDICINE

## 2022-05-27 PROCEDURE — 84153 ASSAY OF PSA TOTAL: CPT | Performed by: PATHOLOGY

## 2022-05-27 PROCEDURE — 80061 LIPID PANEL: CPT | Performed by: INTERNAL MEDICINE

## 2022-05-27 PROCEDURE — 82043 UR ALBUMIN QUANTITATIVE: CPT | Performed by: INTERNAL MEDICINE

## 2022-05-27 ASSESSMENT — ENCOUNTER SYMPTOMS
FEVER: 0
MYALGIAS: 0
ARTHRALGIAS: 1
HEMATOCHEZIA: 0
PARESTHESIAS: 0
CHILLS: 0
EYE PAIN: 0
COUGH: 0
SHORTNESS OF BREATH: 0
DIARRHEA: 0
HEADACHES: 0
HEMATURIA: 0
PALPITATIONS: 0
SORE THROAT: 0
JOINT SWELLING: 0
NERVOUS/ANXIOUS: 0
DIZZINESS: 0
WEAKNESS: 0
NAUSEA: 0
FREQUENCY: 0
DYSURIA: 0
ABDOMINAL PAIN: 0
HEARTBURN: 0
CONSTIPATION: 0

## 2022-05-27 ASSESSMENT — ACTIVITIES OF DAILY LIVING (ADL): CURRENT_FUNCTION: NO ASSISTANCE NEEDED

## 2022-05-27 NOTE — PROGRESS NOTES
"SUBJECTIVE:   Zachariah Ram is a 77 year old male who presents for Preventive Visit.    Eye exam, UTD; 8/2021, EARLY GLAUCOMA, TAKING EYEDROPS    DERM, had a lesion treated at forehead; had 4 places, skin cancer, had melano on side burn, 10 yrs ago, had SCC AND BCC   REMOVED BEHIND EAR LAST ONE      colonoscopy in 10/2020,m non cancerous polyps [facility]; NO MORE REPEATS    Half hr stretching, walking or treadmill or stationary bike    On CPAP wakes up refreshed,   Hearing generally good, and memory is good.      pbs in big grp setting.    UTD on vaccines      '3/70    Partial thyroidectomy in 2007.. was benign    No problems with swallowing except in once a while carrot or chicken and not drinking with it; not choking currently may be 2 x last yr    On famotidine every morning        The ASCVD Risk score (Lexie TREJO Jr., et al., 2013) failed to calculate for the following reasons:    Cannot find a previous HDL lab    Cannot find a previous total cholesterol lab      Patient has been advised of split billing requirements and indicates understanding: Yes  Are you in the first 12 months of your Medicare coverage?  No    Healthy Habits:     In general, how would you rate your overall health?  Good    Frequency of exercise:  4-5 days/week    Duration of exercise:  45-60 minutes    Do you usually eat at least 4 servings of fruit and vegetables a day, include whole grains    & fiber and avoid regularly eating high fat or \"junk\" foods?  Yes    Taking medications regularly:  Yes    Medication side effects:  None    Ability to successfully perform activities of daily living:  No assistance needed    Home Safety:  Lack of grab bars in the bathroom    Hearing Impairment:  Difficulty following a conversation in a noisy restaurant or crowded room    In the past 6 months, have you been bothered by leaking of urine? Yes    In general, how would you rate your overall mental or emotional health?  Excellent      PHQ-2 Total Score: " 0    Additional concerns today:  Yes    Do you feel safe in your environment? Yes    Have you ever done Advance Care Planning? (For example, a Health Directive, POLST, or a discussion with a medical provider or your loved ones about your wishes): Yes, advance care planning is on file.       Fall risk  Fallen 2 or more times in the past year?: No  Any fall with injury in the past year?: No    Cognitive Screening   1) Repeat 3 items (Leader, Season, Table)    2) Clock draw: NORMAL  3) 3 item recall:   Recalls 2 objects   Results: NORMAL clock, 1-2 items recalled: COGNITIVE IMPAIRMENT LESS LIKELY    Mini-CogTM Copyright S Amy. Licensed by the author for use in [facility]; reprinted with permission (soowen@[facility]). All rights reserved.      Do you have sleep apnea, excessive snoring or daytime drowsiness?: no    Reviewed and updated as needed this visit by clinical staff   Tobacco  Allergies  Meds                Reviewed and updated as needed this visit by Provider    Allergies                Social History     Tobacco Use     Smoking status: Never Smoker     Smokeless tobacco: Never Used   Substance Use Topics     Alcohol use: Yes     Comment: 3-4 ounces of venkat with water 2 per week.     If you drink alcohol do you typically have >3 drinks per day or >7 drinks per week? No      No flowsheet data found.    Current providers sharing in care for this patient include:     Patient Care Team:  Edu Reyes MD as PCP - General (Internal Medicine)  Juan Cornelius MD, MD as MD (Urology)  Tommy Sanchez MD as MD (Urology)  Malka Magana, KAIN as Specialty Care Coordinator (Urology)  Edu Reyes MD as Assigned PCP  Tommy Sanchez MD as Assigned Surgical Provider  Juan Toure MD as Assigned Heart and Vascular Provider  Mike Andersen PA-C as Assigned Neuroscience Provider    The following health maintenance items are reviewed in Epic and correct as of today:  Health Maintenance Due    Topic Date Due     HEPATITIS C SCREENING  Never done     LIPID  Never done     Lab work is in process  Labs reviewed in EPIC  BP Readings from Last 3 Encounters:   05/27/22 136/77   11/22/21 131/70   11/10/21 124/66    Wt Readings from Last 3 Encounters:   05/27/22 82.7 kg (182 lb 4.8 oz)   11/22/21 82.1 kg (181 lb)   11/10/21 82.1 kg (181 lb)                  Patient Active Problem List   Diagnosis     Smoking history     Paroxysmal atrial fibrillation (H)     Atrial flutter (H)     GERD (gastroesophageal reflux disease)     Hyperlipidemia     Obstructive sleep apnea     Hypertension     Left inguinal hernia     Lumbar radiculitis     Lumbar disc herniation     Past Surgical History:   Procedure Laterality Date     ABDOMEN SURGERY  Hernia right side 1994.  Prostatectomy [place] 5/20    Normal side effects.     BIOPSY  For various skin cancers, prostate.    Treatment completed at the time.     CARDIOVERSION  08/07/2017     COLONOSCOPY  2015     at [place] [facility] ([place]     EGD, GASTROESOPHAGEAL REFLUX TEST WITH MUCOSAL PH ELECTRODE       ENT SURGERY  Partial parotidectomy 2004    Benign tumor.     GENITOURINARY SURGERY  Prostatectomy [place] 5/20.    Usual side effects.     HC KNEE SCOPE,MED/LAT MENISCUS REPAIR Left 2016     HERNIA REPAIR  1994     HERNIORRHAPHY INGUINAL Left 1/27/2021    Procedure: OPEN LEFT INGUINAL HERNIA REPAIR;  Surgeon: Bernard Caballero MD;  Location: [facility] OR     LASIK       Left atrial appendage ligation  1996     MR PROSTATE  W/O & W CONTRAST  2016, 2018     PAROTIDECTOMY  2004    partial parotidectomy     REPAIR VALVE MITRAL  1996     SKIN CANCER SCREENING      Skin cancer surgeries-- basal on ear 2008, melanoma on left sideburn 2011, carcinoma right arm 2013     THYROIDECTOMY  2008    partial thyroidectomy       Social History     Tobacco Use     Smoking status: Never Smoker     Smokeless tobacco: Never Used   Substance Use Topics     Alcohol use: Yes      Comment: 3-4 ounces of venkat with water 2 per week.     Family History   Problem Relation Age of Onset     Coronary Artery Disease Early Onset Father      Coronary Artery Disease Father         Problems age 50,  age 66.     Diabetes Brother         Since est age 50, overweight then.     Coronary Artery Disease Brother         AFib,etc.  [place] pace maker/defibrulator     Diabetes Maternal Grandfather      Coronary Artery Disease Brother         Mitral valve repaire age 60     Other Cancer Sister          age [id number]     Other Cancer Paternal Grandfather         Cancer of the mouth,  age 66.     Cerebrovascular Disease Paternal Grandfather         Age 66, passed 10 days         Current Outpatient Medications   Medication Sig Dispense Refill     amoxicillin (AMOXIL) 500 MG capsule Prior to dentist 2000mg [30-60 minutes prior to procedure] 12 capsule 1     apixaban ANTICOAGULANT (ELIQUIS ANTICOAGULANT) 5 MG tablet Take 1 tablet (5 mg) by mouth 2 times daily 180 tablet 2     Ascorbic Acid (VITAMIN C) 500 MG CAPS Take 500 mg by mouth daily 500 mg in Am and 1,000 mg in PM       calcium citrate-vitamin D (CITRACAL) 315-200 MG-UNIT TABS per tablet 2 tablets daily        famotidine (PEPCID) 20 MG tablet Take 20 mg by mouth daily        flecainide (TAMBOCOR) 100 MG tablet Take 1 tablet (100 mg) by mouth 2 times daily 180 tablet 2     hydrocortisone (CORTAID) 0.5 % external cream Apply topically daily as needed       ketoconazole (NIZORAL) 2 % cream Apply topically daily as needed for itching        ketoconazole (NIZORAL) 2 % external shampoo Apply topically every other day 100 mL 0     latanoprost (XALATAN) 0.005 % ophthalmic solution Place 1 drop into both eyes daily       levothyroxine (LEVOXYL) 100 MCG tablet Take 1 tablet (100 mcg) by mouth daily 90 tablet 1     losartan (COZAAR) 50 MG tablet Take 2 tablets (100 mg) by mouth At Bedtime 180 tablet 1     magnesium 200 MG TABS Take 250 mg by mouth daily        Misc Natural Products (GLUCOSAMINE CHOND MSM FORMULA PO) Take 1 tablet by mouth daily        Multiple Vitamins-Minerals (VITRUM 50+ SENIOR MULTI) TABS Take by mouth daily       NEW MED Trimix # 4  Papaverine 27.6 mg/mL  Phentolamine 1 mg/mL  Alprostadil 40 mcg/mL  Sig: inject 0.2 mLs intracavernous daily as needed. Allow 24 hours between injections. Use no more than three time weekly  May increase in increments of 0.1 mL up 0.8 mL 5 mL 4     Potassium Gluconate 595 MG CAPS Take 595 mg by mouth daily       tadalafil (CIALIS) 5 MG tablet Take 1 tablet (5 mg) by mouth daily as needed (Erectile dysfunction) 30 tablet 11     tretinoin (RETIN-A) 0.05 % external cream Apply topically At Bedtime       Allergies   Allergen Reactions     Amlodipine Swelling     Noted at 5 mg     Extract Of Poison Ivy      Lisinopril Cough     Recent Labs   Lab Test 11/10/21  1335 06/15/21  1433 01/14/19  1403   ALT 23  --   --    CR 1.12  --  1.16   GFRESTIMATED 63  --  62   GFRESTBLACK  --   --  75   POTASSIUM 4.7  --  4.2   TSH  --  2.06  --       Pneumonia Vaccine:For adults 65 years or older who do not have an immunocompromising condition, cerebrospinal fluid leak, or cochlear implant and want to receive PPSV23 ONLY: Administer 1 dose of PPSV23. Anyone who received any doses of PPSV23 before age 65 should receive 1 final dose of the vaccine at age 65 or older. Administer this last dose at least 5 years after the prior PPSV23 dose.        Review of Systems   Constitutional: Negative for chills and fever.   HENT: Negative for congestion, ear pain, hearing loss and sore throat.    Eyes: Negative for pain and visual disturbance.   Respiratory: Negative for cough and shortness of breath.    Cardiovascular: Negative for chest pain, palpitations and peripheral edema.   Gastrointestinal: Negative for abdominal pain, constipation, diarrhea, heartburn, hematochezia and nausea.   Genitourinary: Positive for impotence. Negative for dysuria,  "frequency, genital sores, hematuria, penile discharge and urgency.   Musculoskeletal: Positive for arthralgias. Negative for joint swelling and myalgias.   Skin: Negative for rash.   Neurological: Negative for dizziness, weakness, headaches and paresthesias.   Psychiatric/Behavioral: Negative for mood changes. The patient is not nervous/anxious.      Constitutional, HEENT, cardiovascular, pulmonary, GI, , musculoskeletal, neuro, skin, endocrine and psych systems are negative, except as otherwise noted.    OBJECTIVE:   /77   Pulse 62   Temp 97.9  F (36.6  C) (Tympanic)   Resp 16   Ht 1.765 m (5' 9.5\")   Wt 82.7 kg (182 lb 4.8 oz)   SpO2 97%   BMI 26.54 kg/m   Estimated body mass index is 26.54 kg/m  as calculated from the following:    Height as of this encounter: 1.765 m (5' 9.5\").    Weight as of this encounter: 82.7 kg (182 lb 4.8 oz).  Physical Exam  GENERAL: healthy, alert and no distress  EYES: Eyes grossly normal to inspection, PERRL and conjunctivae and sclerae normal  HENT: ear canals and TM's normal, nose and mouth without ulcers or lesions  NECK: no adenopathy, no asymmetry, masses, or scars and thyroid normal to palpation  RESP: lungs clear to auscultation - no rales, rhonchi or wheezes  CV: regular rate and rhythm, normal S1 S2, no S3 or S4, no murmur, click or rub, no peripheral edema and peripheral pulses strong  ABDOMEN: soft, nontender, no hepatosplenomegaly, no masses and bowel sounds normal  MS: no gross musculoskeletal defects noted, no edema  SKIN: no suspicious lesions or rashes  NEURO: Normal strength and tone, mentation intact and speech normal  PSYCH: mentation appears normal, affect normal/bright    Diagnostic Test Results:  Labs reviewed in Epic    ASSESSMENT / PLAN:   Zachariah was seen today for physical.    Diagnoses and all orders for this visit:    Routine history and physical examination of adult    Need for hepatitis C screening test    Screening for hyperlipidemia  -     " Lipid panel reflex to direct LDL Fasting    Primary hypertension  -     Comprehensive metabolic panel (BMP + Alb, Alk Phos, ALT, AST, Total. Bili, TP)  -     CBC with platelets  -     Albumin Random Urine Quantitative with Creat Ratio    Hypothyroidism, unspecified type  -     TSH with free T4 reflex    Paroxysmal atrial fibrillation (H)    Dyslipidemia  -     Lipid panel reflex to direct LDL Fasting    Dysphagia, unspecified type  -     Speech Therapy Referral; Future    Chronic pain of left knee  -     Orthopedic  Referral; Future    History of melanoma excision    Other orders  -     REVIEW OF HEALTH MAINTENANCE PROTOCOL ORDERS      Continue follow-up with dermatology for skin cancer screening  Up-to-date on colonoscopy, was told by GI no need for repeat  EGD in the past had some dysphagia ,continues to have some choking episodes but are not frequent, advised to see speech swallow therapy.  Eye exam up-to-date  Maintained on chronic anticoagulation.  Check lipid panel,  Has history of prostate surgery ,he wants to recheck PSA  Left knee pain chronic for the last 4 months especially hurts when going up and down stairs; probable patellofemoral syndrome ,see sports medicine/ orthopedics  Gave him exercises to do.  Blood pressures in the 130s over 70s.  Maintained on chronic levothyroxine, will check TSH.  Follow-up in 3 months and as needed.  Up-to-date on his vaccines.  AAA screen was negative.  Done in 2010.  H/O Melanoma s/p excision, h/o BCC and SCC    Patient has been advised of split billing requirements and indicates understanding: Yes    COUNSELING:  Reviewed preventive health counseling, as reflected in patient instructions       Consider AAA screening for ages 65-75 and smoking history       Regular exercise       Healthy diet/nutrition       Vision screening       Hearing screening       Dental care       Bladder control       Fall risk prevention       Colon cancer screening       Prostate  "cancer screening    Estimated body mass index is 26.54 kg/m  as calculated from the following:    Height as of this encounter: 1.765 m (5' 9.5\").    Weight as of this encounter: 82.7 kg (182 lb 4.8 oz).    Weight management plan: Discussed healthy diet and exercise guidelines    He reports that he has never smoked. He has never used smokeless tobacco.      Appropriate preventive services were discussed with this patient, including applicable screening as appropriate for cardiovascular disease, diabetes, osteopenia/osteoporosis, and glaucoma.  As appropriate for age/gender, discussed screening for colorectal cancer, prostate cancer, breast cancer, and cervical cancer. Checklist reviewing preventive services available has been given to the patient.    Reviewed patients plan of care and provided an AVS. The Basic Care Plan (routine screening as documented in Health Maintenance) for Zachariah meets the Care Plan requirement. This Care Plan has been established and reviewed with the Patient.    Counseling Resources:  ATP IV Guidelines  Pooled Cohorts Equation Calculator  Breast Cancer Risk Calculator  Breast Cancer: Medication to Reduce Risk  FRAX Risk Assessment  ICSI Preventive Guidelines  Dietary Guidelines for Americans, 2010  USDA's MyPlate  ASA Prophylaxis  Lung CA Screening    Edu Reyes MD  [facility]    Identified Health Risks:  "

## 2022-05-28 DIAGNOSIS — D64.9 ANEMIA, UNSPECIFIED TYPE: Primary | ICD-10-CM

## 2022-05-28 LAB
ALBUMIN SERPL-MCNC: 3.5 G/DL (ref 3.4–5)
ALP SERPL-CCNC: 55 U/L (ref 40–150)
ALT SERPL W P-5'-P-CCNC: 26 U/L (ref 0–70)
ANION GAP SERPL CALCULATED.3IONS-SCNC: 3 MMOL/L (ref 3–14)
AST SERPL W P-5'-P-CCNC: 14 U/L (ref 0–45)
BILIRUB SERPL-MCNC: 0.5 MG/DL (ref 0.2–1.3)
BUN SERPL-MCNC: 19 MG/DL (ref 7–30)
CALCIUM SERPL-MCNC: 8.8 MG/DL (ref 8.5–10.1)
CHLORIDE BLD-SCNC: 106 MMOL/L (ref 94–109)
CHOLEST SERPL-MCNC: 190 MG/DL
CO2 SERPL-SCNC: 29 MMOL/L (ref 20–32)
CREAT SERPL-MCNC: 0.99 MG/DL (ref 0.66–1.25)
CREAT UR-MCNC: 69 MG/DL
FASTING STATUS PATIENT QL REPORTED: NO
FERRITIN SERPL-MCNC: 198 NG/ML (ref 26–388)
GFR SERPL CREATININE-BSD FRML MDRD: 78 ML/MIN/1.73M2
GLUCOSE BLD-MCNC: 80 MG/DL (ref 70–99)
HDLC SERPL-MCNC: 63 MG/DL
LDLC SERPL CALC-MCNC: 101 MG/DL
MICROALBUMIN UR-MCNC: <5 MG/L
MICROALBUMIN/CREAT UR: NORMAL MG/G{CREAT}
NONHDLC SERPL-MCNC: 127 MG/DL
POTASSIUM BLD-SCNC: 4.7 MMOL/L (ref 3.4–5.3)
PROT SERPL-MCNC: 6.9 G/DL (ref 6.8–8.8)
PSA SERPL-MCNC: <0.01 UG/L (ref 0–4)
SODIUM SERPL-SCNC: 138 MMOL/L (ref 133–144)
TRIGL SERPL-MCNC: 130 MG/DL
TSH SERPL DL<=0.005 MIU/L-ACNC: 2.87 MU/L (ref 0.4–4)
VIT B12 SERPL-MCNC: 1052 PG/ML (ref 193–986)

## 2022-05-31 ENCOUNTER — MYC MEDICAL ADVICE (OUTPATIENT)
Dept: FAMILY MEDICINE | Facility: CLINIC | Age: 77
End: 2022-05-31
Payer: MEDICARE

## 2022-05-31 DIAGNOSIS — E78.5 HYPERLIPIDEMIA, UNSPECIFIED HYPERLIPIDEMIA TYPE: Primary | ICD-10-CM

## 2022-05-31 RX ORDER — ROSUVASTATIN CALCIUM 20 MG/1
20 TABLET, COATED ORAL DAILY
Qty: 90 TABLET | Refills: 0 | Status: SHIPPED | OUTPATIENT
Start: 2022-05-31 | End: 2022-07-14

## 2022-05-31 NOTE — TELEPHONE ENCOUNTER
Dr. Reyes,     Please see mychart from pt.    1. Would like to start crestor, pended pharmacy. Pt is agreeable to 20mg in chart, so pended that as well as lab if appropriate.    .2. Please order Coronary calcium if appropriate    3-7 are FYI

## 2022-06-06 NOTE — TELEPHONE ENCOUNTER
DIAGNOSIS: chronic pain of L knee/Dr. Reyes/Medicare/no image   APPOINTMENT DATE: 6.17.22   NOTES STATUS DETAILS   OFFICE NOTE from referring provider Internal 5.27.22 Dr Edu Reyes, [facility]   MEDICATION LIST Internal

## 2022-06-13 DIAGNOSIS — M25.562 LEFT KNEE PAIN: Primary | ICD-10-CM

## 2022-06-14 ENCOUNTER — HOSPITAL ENCOUNTER (OUTPATIENT)
Dept: SPEECH THERAPY | Facility: CLINIC | Age: 77
Discharge: HOME | End: 2022-06-14
Attending: INTERNAL MEDICINE
Payer: MEDICARE

## 2022-06-14 DIAGNOSIS — R13.10 DYSPHAGIA, UNSPECIFIED TYPE: Primary | ICD-10-CM

## 2022-06-14 PROCEDURE — 92610 EVALUATE SWALLOWING FUNCTION: CPT | Mod: GN | Performed by: STUDENT IN AN ORGANIZED HEALTH CARE EDUCATION/TRAINING PROGRAM

## 2022-06-14 NOTE — PROGRESS NOTES
Speech-Language Pathology Department   CLINICAL SWALLOW EVALUATION  [facility]    06/14/22 1100   General Information   Type Of Visit Initial   Start Of Care Date 06/14/22   Referring Physician Edu Reyes MD  (PCP)   Orders Evaluate And Treat   Orders Comment 2-3x a year gets choking episode   Medical Diagnosis Dysphagia   Onset Of Illness/injury Or Date Of Surgery 05/27/22  (order date)   Precautions/limitations No Known Precautions/limitations   Hearing WFL for 1:1 conversation in session   Pertinent History of Current Problem/OT: Additional Occupational Profile Info 76yo male presenting with 30+ years of swallowing difficulty.  Pt reports that he was diagnosed with GERD and schatzki ring via EGD with esophageal dilation 30 years ago.  GERD has been well managed with medication, and swallow symptoms will worsen if he goes off of the medication.  Pt reports foods will occasionally get stuck when swallowing, indicating his lower throat/upper chest as the location where it feels like the food will stick.  Foods like carrots and dry meats are most likely to get stuck.  He is able to avoid significant episodes the vast majority of the time by eating slowly, chewing well, and drinking lots of liquid during meals.  He will still experience episodes of things getting stuck ~3x/year even with these strategies.  He has always been able to breathe through episodes.  Sometimes he is able to clear the food by swallowing and sipping liquids, but occasionally he will need to gag himself and make himself throw up to clear the food.  Water will also rarely feel like it gets stuck if he is eating quickly.  He will occasionally cough when drinking liquids, but only if he is tilting his head back to drink water around his CPAP mask.  He denies other coughing/choking when swallowing.  PMH also significant for snoking history, paroxysmal atrial fibrillation, HLD, HEATHER, HTN, left inguinal  hernia.  Please see chart for additional PMH.   Respiratory Status Room air   Prior Level Of Function Swallowing   Prior Level Of Function Comment Pt consumes a regular diet with thin liquids.  He takes small bites, chews well, and takes sips of liquid every few bites.  He will take sips of liquid to clear foods that get stuck.  Standing and walking around can also sometimes help to clear foods that are stuck.  On rare occasions, he needs to gag himself to bring up the food that is stuck.   Patient/family Goals To have a safe swallow   Clinical Swallow Evaluation   Oral Musculature generally intact   Structural Abnormalities none present   Dentition present and adequate   Mucosal Quality good   Mandibular Strength and Mobility intact   Oral Labial Strength and Mobility WFL   Lingual Strength and Mobility WFL   Velar Elevation intact   Buccal Strength and Mobility intact  (Minimal air escapes from lips with added pressure to the cheeks)   Laryngeal Function Throat clear;Swallow;Voicing initiated;Dry swallow palpated  (WNL voice and hyolaryngeal excursion)   Oral Musculature Comments WFL oral musculature   Additional Documentation Yes   Clinical Swallow Eval: Thin Liquid Texture Trial   Mode of Presentation, Thin Liquids cup;self-fed   Volume of Liquid or Food Presented Sips of water x2   Oral Phase of Swallow WFL   Pharyngeal Phase of Swallow intact   Diagnostic Statement No overt s/sx of aspiration/penetration observed.   Clinical Swallow Eval: Soft & Bite-sized   Mode of Presentation spoon;self-fed   Volume Presented 1 tsp fruit cocktail in juice x2   Oral Phase WFL   Pharyngeal Phase intact   Diagnostic Statement No overt s/sx of aspiration/penetration observed.   Clinical Swallow Eval: Regular (Solid)   Mode of Presentation self-fed   Volume Presented 1/2 jessi cracker across 3 trials   Oral Phase WFL   Pharyngeal Phase intact   Diagnostic Statement No overt s/sx of aspiration/penetration observed.  Single  throat clear observed following all trials, but not immediately following swallow.   Educational Assessment   Barriers to Learning No barriers   Preferred Learning Style Listening;Reading;Demonstration;Pictures/video   Esophageal Phase of Swallow   Patient reports or presents with symptoms of esophageal dysphagia Yes   Esophageal comments Pt has a long history of GERD managed with medication and schatzki ring.  Pt also reporting that he needs to gag and get himself to vomit up foods that get stuck, possibly indicating that foods are getting stuck in the esophagus rather than the pharynx.   General Therapy Interventions   Intervention Comments No skilled SLP services warranted or planned at this time, no goals.  Recommend GI referral for potential esophageal dysphagia.   Swallow Eval: Clinical Impressions   Skilled Criteria for Therapy Intervention No problems identified which require skilled intervention   Functional Assessment Scale (FAS) 6   Dysphagia Outcome Severity Scale (GERALD) Level 6 - GERALD   Treatment Diagnosis Likely esophageal dysphagia vs pharyngeal dysphagia   Diet texture recommendations Regular diet;Thin liquids (level 0)   Recommended Feeding/Eating Techniques alternate between small bites and sips of food/liquid;maintain upright posture during/after eating for 30 mins;small sips/bites;other (see comments)  (Chew well, reduce distractions, try relaxed breathing techniques in addition to sips of water when foods get stuck)   Demonstrates Need for Referral to Another Service other (see comments)  (GI)   Risks and Benefits of Treatment have been explained. Yes   Patient, family and/or staff in agreement with Plan of Care Yes   Clinical Impression Comments Mr. Ram presents with WFL oropharyngeal swallow on clinical swallow evaluation today, although he reports only 3x/year swallowing episodes.  Based on today's evaluation, pt's history of esophageal problems including GERD and schatzki ring, and pt's  description of his symptoms (foods sticking in lower throat/upper chest, needing to gag/vomit to clear food rather than cough), suspect his swallowing difficulties are esophageal in nature, but pharyngeal dysphagia cannot be r/o without instrumental swallow assessment.  RECOMMEND: 1. GI referral for esophageal dysphagia--MD, please order if appropriate.  2. Regular diet with thin liquids and use of the safe swallow strategies listed above.  No additional skilled SLP services warranted at this time.  SLP provided education regarding evaluation findings and recommendations to pt, and he verbalized understanding.   Total Session Time   SLP Eval: oral/pharyngeal swallow function, clinical minutes (25840) 40   Total Evaluation Time 40     Thank you for the referral of this patient.    Allison Alpers Ackmann, B.A. (vanesa MRAVEN, CCC-SLP  Speech-Language Pathologist  [facility] Certificate of Vocology  [facility]  [phone]

## 2022-06-17 ENCOUNTER — ANCILLARY PROCEDURE (OUTPATIENT)
Dept: GENERAL RADIOLOGY | Facility: CLINIC | Age: 77
End: 2022-06-17
Attending: FAMILY MEDICINE
Payer: MEDICARE

## 2022-06-17 ENCOUNTER — OFFICE VISIT (OUTPATIENT)
Dept: ORTHOPEDICS | Facility: CLINIC | Age: 77
End: 2022-06-17
Payer: MEDICARE

## 2022-06-17 ENCOUNTER — PRE VISIT (OUTPATIENT)
Dept: ORTHOPEDICS | Facility: CLINIC | Age: 77
End: 2022-06-17
Payer: MEDICARE

## 2022-06-17 DIAGNOSIS — M25.562 CHRONIC PAIN OF LEFT KNEE: ICD-10-CM

## 2022-06-17 DIAGNOSIS — G89.29 CHRONIC PAIN OF LEFT KNEE: ICD-10-CM

## 2022-06-17 PROCEDURE — 73562 X-RAY EXAM OF KNEE 3: CPT | Mod: LT | Performed by: RADIOLOGY

## 2022-06-17 PROCEDURE — 99203 OFFICE O/P NEW LOW 30 MIN: CPT | Performed by: FAMILY MEDICINE

## 2022-06-17 RX ORDER — ROSUVASTATIN CALCIUM 20 MG/1
20 TABLET, COATED ORAL DAILY
COMMUNITY
Start: 2022-06-10 | End: 2022-09-15

## 2022-06-17 NOTE — PROGRESS NOTES
CHIEF COMPLAINT:  Pain of the Left Knee       HISTORY OF PRESENT ILLNESS  Mr. Ram is a pleasant 77 year old year old male who presents to clinic today with left knee pain.  Zachariah explains that the knee is stiff and it feels tight with knee flexion.     He notes the greatest region of pain that brings him in today is actually anterior about the knee, pointing below the kneecap.  He is able to continue golfing and hiking without significant difficulty, but he is here to make sure this does not progress.    Known history of meniscectomy of left knee 6 years ago.  Unsure if he was diagnosed with arthritis at that time.    Onset: gradual  Location: left knee  Quality:  aching, dull and stiffness  Duration: 4 months   Severity: 2/10 at worst  Timing:intermittent episodes   Modifying factors:  resting and non-use makes it better, movement and use makes it worse  Associated signs & symptoms: pain  Previous similar pain: Yes, menscius surgery 6 years ago, CSI 1 year ago   Treatments to date:NA     Additional history: as documented    Review of Systems:    Have you recently had a a fever, chills, weight loss? No    Do you have any vision problems? No    Do you have any chest pain or edema? No    Do you have any shortness of breath or wheezing?  No    Do you have stomach problems? No    Do you have any numbness or focal weakness? No     Do you have diabetes? No    Do you have problems with bleeding or clotting? No    Do you have an rashes or other skin lesions? No    MEDICAL HISTORY  Patient Active Problem List   Diagnosis     Smoking history     Paroxysmal atrial fibrillation (H)     Atrial flutter (H)     GERD (gastroesophageal reflux disease)     Hyperlipidemia     Obstructive sleep apnea     Hypertension     Left inguinal hernia     Lumbar radiculitis     Lumbar disc herniation       Current Outpatient Medications   Medication Sig Dispense Refill     rosuvastatin (CRESTOR) 20 MG tablet Take 20 mg by mouth once        amoxicillin (AMOXIL) 500 MG capsule Prior to dentist 2000mg [30-60 minutes prior to procedure] 12 capsule 1     apixaban ANTICOAGULANT (ELIQUIS ANTICOAGULANT) 5 MG tablet Take 1 tablet (5 mg) by mouth 2 times daily 180 tablet 2     Ascorbic Acid (VITAMIN C) 500 MG CAPS Take 500 mg by mouth daily 500 mg in Am and 1,000 mg in PM       calcium citrate-vitamin D (CITRACAL) 315-200 MG-UNIT TABS per tablet 2 tablets daily        famotidine (PEPCID) 20 MG tablet Take 20 mg by mouth daily        flecainide (TAMBOCOR) 100 MG tablet Take 1 tablet (100 mg) by mouth 2 times daily 180 tablet 2     hydrocortisone (CORTAID) 0.5 % external cream Apply topically daily as needed       ketoconazole (NIZORAL) 2 % cream Apply topically daily as needed for itching        ketoconazole (NIZORAL) 2 % external shampoo Apply topically every other day 100 mL 0     latanoprost (XALATAN) 0.005 % ophthalmic solution Place 1 drop into both eyes daily       levothyroxine (LEVOXYL) 100 MCG tablet Take 1 tablet (100 mcg) by mouth daily 90 tablet 1     losartan (COZAAR) 50 MG tablet Take 2 tablets (100 mg) by mouth At Bedtime 180 tablet 1     magnesium 200 MG TABS Take 250 mg by mouth daily       Misc Natural Products (GLUCOSAMINE CHOND MSM FORMULA PO) Take 1 tablet by mouth daily        Multiple Vitamins-Minerals (VITRUM 50+ SENIOR MULTI) TABS Take by mouth daily       NEW MED Trimix # 4  Papaverine 27.6 mg/mL  Phentolamine 1 mg/mL  Alprostadil 40 mcg/mL  Sig: inject 0.2 mLs intracavernous daily as needed. Allow 24 hours between injections. Use no more than three time weekly  May increase in increments of 0.1 mL up 0.8 mL 5 mL 4     Potassium Gluconate 595 MG CAPS Take 595 mg by mouth daily       rosuvastatin (CRESTOR) 20 MG tablet Take 1 tablet (20 mg) by mouth daily 90 tablet 0     tadalafil (CIALIS) 5 MG tablet Take 1 tablet (5 mg) by mouth daily as needed (Erectile dysfunction) 30 tablet 11     tretinoin (RETIN-A) 0.05 % external cream Apply  topically At Bedtime         Allergies   Allergen Reactions     Amlodipine Swelling     Noted at 5 mg     Extract Of Poison Ivy      Lisinopril Cough       Family History   Problem Relation Age of Onset     Coronary Artery Disease Early Onset Father      Coronary Artery Disease Father         Problems age 50,  age 66.     Diabetes Brother         Since est age 50, overweight then.     Coronary Artery Disease Brother         AFib,etc.  [place] pace maker/defibrulator     Diabetes Maternal Grandfather      Coronary Artery Disease Brother         Mitral valve repaire age 60     Other Cancer Sister          age [id number]     Other Cancer Paternal Grandfather         Cancer of the mouth,  age 66.     Cerebrovascular Disease Paternal Grandfather         Age 66, passed 10 days       Additional medical/Social/Surgical histories reviewed in [facility] and updated as appropriate.       PHYSICAL EXAM  There were no vitals taken for this visit.    General  - normal appearance, in no obvious distress  HEENT  - conjunctivae not injected, moist mucous membranes  CV  - normal popliteal pulse  Pulm  - normal respiratory pattern, non-labored  Musculoskeletal - Left knee  - stance: normal gait without limp  - inspection: Trace effusion, normal muscle tone, normal bone and joint alignment, no obvious deformity  - palpation: Mild medial joint line tenderness, normal popliteal pulse, tender over proximal aspect of the patellar tendon  - ROM: 135 degrees flexion, -5 degrees extension, painful passive flexion terminally  - strength: 5/5 in flexion, 5/5 in extension  - special tests:  (-) Lachman  (-) anterior drawer  (-) Ran  (-) varus at 0 and 30 degrees flexion  (-) valgus at 0 and 30 degrees flexion  Neuro  - no sensory or motor deficit, grossly normal coordination, normal muscle tone  Skin  - no ecchymosis, erythema, warmth, or induration, no obvious rash  Psych  - interactive, appropriate, normal mood and affect    IMAGING :  Left knee xray. Final results and radiologist's interpretation, available in the [facility] health record. Images were reviewed with the patient/family members in the office today. My personal interpretation of the performed imaging is moderate medial compartment and mild patellofemoral compartment arthritic changes     ASSESSMENT & PLAN  Mr. Ram is a 77 year old year old male who presents to clinic today with left anterior knee pain over the past 4 months.    Region of tenderness overlying patellar tendon today.  He does have a mild effusion however no significant joint line tenderness.    Diagnosis:   Primary osteoarthritis of left knee  Patellar tendinitis of left knee    Treatment options discussed including HEP, PT and he wishes to start with a home exercise program for patellar tendinitis.  He is on Eliquis therefore I would not recommend any anti-inflammatory medications.  We could consider addressing his knee osteoarthritis next, if he continues to experience mild effusion and has ongoing anterior knee pain.  It is not affecting his activities and he can continue all activities as tolerated, consider icing regularly after rounds of golf or hitting balls.    It was a pleasure seeing Zachariah today.    Nhan Santiago DO, CAQSM  Primary Care Sports Medicine

## 2022-06-17 NOTE — PATIENT INSTRUCTIONS
Patellar Tendonitis     WHAT IS JUMPER S KNEE?    Jumper s knee is a problem with the tendon that connects your kneecap to your shinbone. Tendons are strong bands of tissue that connect muscle to bone. They can be injured suddenly or they may be slowly damaged over time. You can have tiny or partial tears in your tendon. If you have a complete tear of your tendon, it is called a rupture. Other tendon injuries may be called a strain, tendinosis, or tendonitis. Jumper's knee is also called a patellar tendon injury, or patellar tendinopathy.    WHAT IS THE CAUSE?    Jumper's knee can be caused by:    Overuse of the tendon from a sport or work activity that involves your knees, such as too much jumping, running, walking, or bicycling  A sudden activity that twists or tears your tendon, such as a fall or an accident  Jumper s knee can also happen if your hips, legs, knees, or feet are not aligned properly. People whose hips are wide, who are knock-kneed, or who have feet with arches that collapse when they walk or run can have this problem.    WHAT ARE THE SYMPTOMS?    Symptoms may include:    Pain and tenderness around your knee and behind your kneecap  Pain when you bend or straighten your leg  Pain when you jump, run, or walk, especially downhill or down stairs  Swelling in your knee joint or where your tendon attaches to your shinbone  If your tendon is torn, usually you will have sudden severe pain and you will not be able to straighten your leg or walk.    HOW IS IT DIAGNOSED?    Your healthcare provider will examine you and ask about your symptoms, activities, and medical history. You may have X-rays or other scans.    HOW IS IT TREATED?    You will need to change or stop doing the activities that cause pain until the tendon has healed. For example, swim instead of run.    You may need to wear a special strap that goes over your patellar tendon or a knee brace that helps support and protect your knee while your  tendon heals. Special shoes or shoe inserts may also help.    Your healthcare provider may recommend stretching and strengthening exercises to help you heal.    If your tendon is torn, you may need surgery to repair the tendon.    The pain often gets better within a few weeks with self-care, but some injuries may take several months or longer to heal. It s important to follow all of your healthcare provider s instructions.    HOW CAN I TAKE CARE OF MYSELF?    To help the swelling and pain:    Put an ice pack, gel pack, or package of frozen vegetables wrapped in a cloth, on the area every 3 to 4 hours for up to 20 minutes at a time.  Keep your knee up on a pillow when you sit or lie down.  Take pain medicine, such as acetaminophen, ibuprofen, or other medicine as directed by your provider. Nonsteroidal anti-inflammatory medicines (NSAIDs), such as ibuprofen, may cause stomach bleeding and other problems. These risks increase with age. Read the label and take as directed. Unless recommended by your healthcare provider, do not take for more than 10 days.  Follow your healthcare provider's instructions, including any exercises recommended by your provider. Ask your provider:    How and when you will hear your test results  How long it will take to recover  What activities you should avoid, including how much you can lift, and when you can return to your normal activities  How to take care of yourself at home  What symptoms or problems you should watch for and what to do if you have them  Make sure you know when you should come back for a checkup.    HOW CAN I HELP PREVENT JUMPER S KNEE?    Warm-up exercises and stretching before activities can help prevent injuries. For example, do stretches and exercises that strengthen your thigh muscles. If your knee hurts after exercise, putting ice on it may help keep it from getting injured.    Follow the safety rules for your work or sport and use protective equipment, like  wearing the right type of shoes for your activities.    Developed by [employer].  Published by [employer].  Copyright  2014 [employer] and/or one of its subsidiaries. All rights reserved.    References    Patellar Tendonitis Exercises    You can do the first 4 exercises right away. When you have less pain in your knee, you can do the remaining exercises.    Standing hamstring stretch: Put the heel of the leg on your injured side on a stool about 15 inches high. Keep your leg straight. Lean forward, bending at the hips, until you feel a mild stretch in the back of your thigh. Make sure you don't roll your shoulders or bend at the waist when doing this or you will stretch your lower back instead of your leg. Hold the stretch for 15 to 30 seconds. Repeat 3 times.    Quadriceps stretch: Stand at an arm's length away from the wall with your injured side farthest from the wall. Facing straight ahead, brace yourself by keeping one hand against the wall. With your other hand, grasp the ankle on your injured side and pull your heel toward your buttocks. Don't arch or twist your back. Keep your knees together. Hold this stretch for 15 to 30 seconds.    Side-lying leg lift: Lie on your uninjured side. Tighten the front thigh muscles on your injured leg and lift that leg 8 to 10 inches (20 to 25 centimeters) away from the other leg. Keep the leg straight and lower it slowly. Do 2 sets of 15.  Rectus femoris stretch: Kneel on your injured knee on a padded surface. Place your other leg in front of you with your foot flat on the floor. Keep your head and chest facing forward and upright and grab the ankle behind you. Gently bring your ankle back toward your buttocks until you feel a stretch in the front of your thigh. Hold 15 to 30 seconds. Repeat 2 to 3 times.    Straight leg raise: Lie on your back with your legs straight out in front of you. Bend the knee on your uninjured side and place the foot flat on the floor.  Tighten the thigh muscle on your injured side and lift your leg about 8 inches off the floor. Keep your leg straight and your thigh muscle tight. Slowly lower your leg back down to the floor. Do 2 sets of 15.    Prone hip extension: Lie on your stomach with your legs straight out behind you. Fold your arms under your head and rest your head on your arms. Draw your belly button in towards your spine and tighten your abdominal muscles. Tighten the buttocks and thigh muscles of the leg on your injured side and lift the leg off the floor about 8 inches. Keep your leg straight. Hold for 5 seconds. Then lower your leg and relax. Do 2 sets of 15.    Clam exercise: Lie on your uninjured side with your hips and knees bent and feet together. Slowly raise your top leg toward the ceiling while keeping your heels touching each other. Hold for 2 seconds and lower slowly. Do 2 sets of 15 repetitions.    Step-up: Stand with the foot of your injured leg on a support 3 to 5 inches (8 to 13 centimeters) high --like a small step or block of wood. Keep your other foot flat on the floor. Shift your weight onto the injured leg on the support. Straighten your injured leg as the other leg comes off the floor. Return to the starting position by bending your injured leg and slowly lowering your uninjured leg back to the floor. Do 2 sets of 15.    Wall squat with a ball: Stand with your back, shoulders, and head against a wall. Look straight ahead. Keep your shoulders relaxed and your feet 3 feet (90 centimeters) from the wall and shoulder's width apart. Place a soccer or basketball-sized ball behind your back. Keeping your back against the wall, slowly squat down to a 45-degree angle. Your thighs will not yet be parallel to the floor. Hold this position for 10 seconds and then slowly slide back up the wall. Repeat 10 times. Build up to 2 sets of 15.    Knee stabilization: Wrap a piece of elastic tubing around the ankle of your uninjured leg.  Tie a knot in the other end of the tubing and close it in a door at about ankle height.    Stand facing the door on the leg without tubing (your injured leg) and bend your knee slightly, keeping your thigh muscles tight. Stay in this position while you move the leg with the tubing (the uninjured leg) straight back behind you. Do 2 sets of 15.  Turn 90 degrees so the leg without tubing is closest to the door. Move the leg with tubing away from your body. Do 2 sets of 15.  Turn 90 degrees again so your back is to the door. Move the leg with tubing straight out in front of you. Do 2 sets of 15.  Turn your body 90 degrees again so the leg with tubing is closest to the door. Move the leg with tubing across your body. Do 2 sets of 15.  Hold onto a chair if you need help balancing. This exercise can be made more challenging by standing on a firm pillow or foam mat while you move the leg with tubing.    Resisted terminal knee extension: Make a loop with a piece of elastic tubing by tying a knot in both ends. Close the knot in a door at knee height. Step into the loop with your injured leg so the tubing is around the back of your knee. Lift the other foot off the ground and hold onto a chair for balance, if needed. Bend the knee with tubing about 45 degrees. Slowly straighten your leg, keeping your thigh muscle tight as you do this. Repeat 15 times. Do 2 sets of 15. If you need an easier way to do this, stand on both legs for better support while you do the exercise.  Decline single-leg squat: Stand with both feet on an angled platform or with your heels on a board about 3 inches (8 centimeters) high. Put all of your weight on your injured leg and squat down to a 45-degree angle. Use your other leg to help you return to a standing position from the squat. You should lower your body to a squat using only your injured leg but you can use both legs to return to standing. When this exercise gets easy, hold weights in your hands  to make the exercise more difficult. Do 2 sets of 15.    Developed by [employer].  Published by [employer].  Copyright  2014 [employer] and/or one of its subsidiaries. All rights reserved.    References

## 2022-06-17 NOTE — LETTER
6/17/2022      RE: Zachariah Ram  [address]  [address]  [place] [address]     Dear Colleague,    Thank you for referring your patient, Zachariah Ram, to the [facility] [place]. Please see a copy of my visit note below.    CHIEF COMPLAINT:  Pain of the Left Knee       HISTORY OF PRESENT ILLNESS  Mr. Ram is a pleasant 77 year old year old male who presents to clinic today with left knee pain.  Zachariah explains that the knee is stiff and it feels tight with knee flexion.     He notes the greatest region of pain that brings him in today is actually anterior about the knee, pointing below the kneecap.  He is able to continue golfing and hiking without significant difficulty, but he is here to make sure this does not progress.    Known history of meniscectomy of left knee 6 years ago.  Unsure if he was diagnosed with arthritis at that time.    Onset: gradual  Location: left knee  Quality:  aching, dull and stiffness  Duration: 4 months   Severity: 2/10 at worst  Timing:intermittent episodes   Modifying factors:  resting and non-use makes it better, movement and use makes it worse  Associated signs & symptoms: pain  Previous similar pain: Yes, menscius surgery 6 years ago, CSI 1 year ago   Treatments to date:NA     Additional history: as documented    Review of Systems:    Have you recently had a a fever, chills, weight loss? No    Do you have any vision problems? No    Do you have any chest pain or edema? No    Do you have any shortness of breath or wheezing?  No    Do you have stomach problems? No    Do you have any numbness or focal weakness? No     Do you have diabetes? No    Do you have problems with bleeding or clotting? No    Do you have an rashes or other skin lesions? No    MEDICAL HISTORY  Patient Active Problem List   Diagnosis     Smoking history     Paroxysmal atrial fibrillation (H)     Atrial flutter (H)     GERD (gastroesophageal reflux disease)     Hyperlipidemia      Obstructive sleep apnea     Hypertension     Left inguinal hernia     Lumbar radiculitis     Lumbar disc herniation       Current Outpatient Medications   Medication Sig Dispense Refill     rosuvastatin (CRESTOR) 20 MG tablet Take 20 mg by mouth once       amoxicillin (AMOXIL) 500 MG capsule Prior to dentist 2000mg [30-60 minutes prior to procedure] 12 capsule 1     apixaban ANTICOAGULANT (ELIQUIS ANTICOAGULANT) 5 MG tablet Take 1 tablet (5 mg) by mouth 2 times daily 180 tablet 2     Ascorbic Acid (VITAMIN C) 500 MG CAPS Take 500 mg by mouth daily 500 mg in Am and 1,000 mg in PM       calcium citrate-vitamin D (CITRACAL) 315-200 MG-UNIT TABS per tablet 2 tablets daily        famotidine (PEPCID) 20 MG tablet Take 20 mg by mouth daily        flecainide (TAMBOCOR) 100 MG tablet Take 1 tablet (100 mg) by mouth 2 times daily 180 tablet 2     hydrocortisone (CORTAID) 0.5 % external cream Apply topically daily as needed       ketoconazole (NIZORAL) 2 % cream Apply topically daily as needed for itching        ketoconazole (NIZORAL) 2 % external shampoo Apply topically every other day 100 mL 0     latanoprost (XALATAN) 0.005 % ophthalmic solution Place 1 drop into both eyes daily       levothyroxine (LEVOXYL) 100 MCG tablet Take 1 tablet (100 mcg) by mouth daily 90 tablet 1     losartan (COZAAR) 50 MG tablet Take 2 tablets (100 mg) by mouth At Bedtime 180 tablet 1     magnesium 200 MG TABS Take 250 mg by mouth daily       Misc Natural Products (GLUCOSAMINE CHOND MSM FORMULA PO) Take 1 tablet by mouth daily        Multiple Vitamins-Minerals (VITRUM 50+ SENIOR MULTI) TABS Take by mouth daily       NEW MED Trimix # 4  Papaverine 27.6 mg/mL  Phentolamine 1 mg/mL  Alprostadil 40 mcg/mL  Sig: inject 0.2 mLs intracavernous daily as needed. Allow 24 hours between injections. Use no more than three time weekly  May increase in increments of 0.1 mL up 0.8 mL 5 mL 4     Potassium Gluconate 595 MG CAPS Take 595 mg by mouth daily        rosuvastatin (CRESTOR) 20 MG tablet Take 1 tablet (20 mg) by mouth daily 90 tablet 0     tadalafil (CIALIS) 5 MG tablet Take 1 tablet (5 mg) by mouth daily as needed (Erectile dysfunction) 30 tablet 11     tretinoin (RETIN-A) 0.05 % external cream Apply topically At Bedtime         Allergies   Allergen Reactions     Amlodipine Swelling     Noted at 5 mg     Extract Of Poison Ivy      Lisinopril Cough       Family History   Problem Relation Age of Onset     Coronary Artery Disease Early Onset Father      Coronary Artery Disease Father         Problems age 50,  age 66.     Diabetes Brother         Since est age 50, overweight then.     Coronary Artery Disease Brother         AFib,etc.  [place] pace maker/defibrulator     Diabetes Maternal Grandfather      Coronary Artery Disease Brother         Mitral valve repaire age 60     Other Cancer Sister          age [id number]     Other Cancer Paternal Grandfather         Cancer of the mouth,  age 66.     Cerebrovascular Disease Paternal Grandfather         Age 66, passed 10 days       Additional medical/Social/Surgical histories reviewed in [facility] and updated as appropriate.       PHYSICAL EXAM  There were no vitals taken for this visit.    General  - normal appearance, in no obvious distress  HEENT  - conjunctivae not injected, moist mucous membranes  CV  - normal popliteal pulse  Pulm  - normal respiratory pattern, non-labored  Musculoskeletal - Left knee  - stance: normal gait without limp  - inspection: Trace effusion, normal muscle tone, normal bone and joint alignment, no obvious deformity  - palpation: Mild medial joint line tenderness, normal popliteal pulse, tender over proximal aspect of the patellar tendon  - ROM: 135 degrees flexion, -5 degrees extension, painful passive flexion terminally  - strength: 5/5 in flexion, 5/5 in extension  - special tests:  (-) Lachman  (-) anterior drawer  (-) Ran  (-) varus at 0 and 30 degrees flexion  (-) valgus at 0  and 30 degrees flexion  Neuro  - no sensory or motor deficit, grossly normal coordination, normal muscle tone  Skin  - no ecchymosis, erythema, warmth, or induration, no obvious rash  Psych  - interactive, appropriate, normal mood and affect    IMAGING : Left knee xray. Final results and radiologist's interpretation, available in the [facility] health record. Images were reviewed with the patient/family members in the office today. My personal interpretation of the performed imaging is moderate medial compartment and mild patellofemoral compartment arthritic changes     ASSESSMENT & PLAN  Mr. Ram is a 77 year old year old male who presents to clinic today with left anterior knee pain over the past 4 months.    Region of tenderness overlying patellar tendon today.  He does have a mild effusion however no significant joint line tenderness.    Diagnosis:   Primary osteoarthritis of left knee  Patellar tendinitis of left knee    Treatment options discussed including HEP, PT and he wishes to start with a home exercise program for patellar tendinitis.  He is on Eliquis therefore I would not recommend any anti-inflammatory medications.  We could consider addressing his knee osteoarthritis next, if he continues to experience mild effusion and has ongoing anterior knee pain.  It is not affecting his activities and he can continue all activities as tolerated, consider icing regularly after rounds of golf or hitting balls.    It was a pleasure seeing Zachariah today.    Nhan Santiago, DO, CAQSM  Primary Care Sports Medicine

## 2022-06-21 ENCOUNTER — HOSPITAL ENCOUNTER (OUTPATIENT)
Dept: CARDIOLOGY | Facility: CLINIC | Age: 77
Discharge: HOME | End: 2022-06-21
Attending: INTERNAL MEDICINE | Admitting: INTERNAL MEDICINE
Payer: COMMERCIAL

## 2022-06-21 DIAGNOSIS — E78.5 HYPERLIPIDEMIA, UNSPECIFIED HYPERLIPIDEMIA TYPE: ICD-10-CM

## 2022-06-21 PROCEDURE — 75571 CT HRT W/O DYE W/CA TEST: CPT

## 2022-06-21 PROCEDURE — 75571 CT HRT W/O DYE W/CA TEST: CPT | Mod: 26 | Performed by: INTERNAL MEDICINE

## 2022-06-21 RX ORDER — ONDANSETRON 2 MG/ML
4 INJECTION INTRAMUSCULAR; INTRAVENOUS
Status: DISCONTINUED | OUTPATIENT
Start: 2022-06-21 | End: 2022-06-22 | Stop reason: HOSPADM

## 2022-06-21 RX ORDER — DIPHENHYDRAMINE HYDROCHLORIDE 50 MG/ML
25-50 INJECTION INTRAMUSCULAR; INTRAVENOUS
Status: DISCONTINUED | OUTPATIENT
Start: 2022-06-21 | End: 2022-06-22 | Stop reason: HOSPADM

## 2022-06-21 RX ORDER — ACYCLOVIR 200 MG/1
0-1 CAPSULE ORAL
Status: DISCONTINUED | OUTPATIENT
Start: 2022-06-21 | End: 2022-06-22 | Stop reason: HOSPADM

## 2022-06-21 RX ORDER — DIPHENHYDRAMINE HCL 25 MG
25 CAPSULE ORAL
Status: DISCONTINUED | OUTPATIENT
Start: 2022-06-21 | End: 2022-06-22 | Stop reason: HOSPADM

## 2022-06-21 RX ORDER — METHYLPREDNISOLONE SODIUM SUCCINATE 125 MG/2ML
125 INJECTION, POWDER, LYOPHILIZED, FOR SOLUTION INTRAMUSCULAR; INTRAVENOUS
Status: DISCONTINUED | OUTPATIENT
Start: 2022-06-21 | End: 2022-06-22 | Stop reason: HOSPADM

## 2022-06-24 ENCOUNTER — VIRTUAL VISIT (OUTPATIENT)
Dept: FAMILY MEDICINE | Facility: CLINIC | Age: 77
End: 2022-06-24
Payer: MEDICARE

## 2022-06-24 DIAGNOSIS — I10 PRIMARY HYPERTENSION: ICD-10-CM

## 2022-06-24 DIAGNOSIS — M17.12 PRIMARY OSTEOARTHRITIS OF LEFT KNEE: ICD-10-CM

## 2022-06-24 DIAGNOSIS — E78.5 HYPERLIPIDEMIA, UNSPECIFIED HYPERLIPIDEMIA TYPE: Primary | ICD-10-CM

## 2022-06-24 DIAGNOSIS — R13.19 ESOPHAGEAL DYSPHAGIA: ICD-10-CM

## 2022-06-24 PROCEDURE — 99214 OFFICE O/P EST MOD 30 MIN: CPT | Mod: 95 | Performed by: INTERNAL MEDICINE

## 2022-07-06 ENCOUNTER — TRANSFERRED RECORDS (OUTPATIENT)
Dept: HEALTH INFORMATION MANAGEMENT | Facility: CLINIC | Age: 77
End: 2022-07-06

## 2022-07-11 DIAGNOSIS — E78.5 HYPERLIPIDEMIA, UNSPECIFIED HYPERLIPIDEMIA TYPE: ICD-10-CM

## 2022-07-11 DIAGNOSIS — E03.9 HYPOTHYROIDISM, UNSPECIFIED TYPE: ICD-10-CM

## 2022-07-14 RX ORDER — LEVOTHYROXINE SODIUM 100 UG/1
100 TABLET ORAL DAILY
Qty: 90 TABLET | Refills: 0 | Status: SHIPPED | OUTPATIENT
Start: 2022-07-14 | End: 2022-09-19

## 2022-07-14 RX ORDER — ROSUVASTATIN CALCIUM 20 MG/1
20 TABLET, COATED ORAL DAILY
Qty: 90 TABLET | Refills: 0 | Status: SHIPPED | OUTPATIENT
Start: 2022-07-14 | End: 2022-08-30

## 2022-07-14 NOTE — TELEPHONE ENCOUNTER
Prescription approved per [facility] Refill Protocol.  Alida Mar, RN  [facility] RN Triage Team

## 2022-07-25 ENCOUNTER — TELEPHONE (OUTPATIENT)
Dept: CARDIOLOGY | Facility: CLINIC | Age: 77
End: 2022-07-25

## 2022-07-25 ENCOUNTER — TRANSFERRED RECORDS (OUTPATIENT)
Dept: HEALTH INFORMATION MANAGEMENT | Facility: CLINIC | Age: 77
End: 2022-07-25

## 2022-07-25 NOTE — TELEPHONE ENCOUNTER
"7/25/22 Returned call to CAROL and MANUELA on secure VM. Per '[facility] procedural Management of Anticoagulation in Afib Patients \" flow sheet, pts CHADS-VASc is 3 ( age and HTN) so recommendation is to hold Eliquis x 3 days, no bridge and resume the day after procedure. Left Afib RN phone # to call back w questions.  Nadir 456 pm  "

## 2022-07-25 NOTE — TELEPHONE ENCOUNTER
[facility] Call Center    Phone Message    May a detailed message be left on voicemail: yes     Reason for Call: Other: Pilar RN calling from [facility], requesting a 3 day hold and bridge for pts Eliquis. Pt is scheduled for upper endoscopy 8/16/22. Pilar states if Dr. Toure prefers less than a 3 day hold to have a creatine level taken. Please review and call Pilar back. Thank you!     Action Taken: Other: Cardiology    Travel Screening: Not Applicable

## 2022-08-10 ENCOUNTER — TRANSFERRED RECORDS (OUTPATIENT)
Dept: ORTHOPEDICS | Facility: CLINIC | Age: 77
End: 2022-08-10

## 2022-08-10 ENCOUNTER — OFFICE VISIT (OUTPATIENT)
Dept: ORTHOPEDICS | Facility: CLINIC | Age: 77
End: 2022-08-10
Payer: MEDICARE

## 2022-08-10 VITALS
SYSTOLIC BLOOD PRESSURE: 134 MMHG | DIASTOLIC BLOOD PRESSURE: 68 MMHG | HEIGHT: 69 IN | BODY MASS INDEX: 26.96 KG/M2 | WEIGHT: 182 LBS

## 2022-08-10 DIAGNOSIS — M17.12 PRIMARY OSTEOARTHRITIS OF LEFT KNEE: Primary | ICD-10-CM

## 2022-08-10 PROCEDURE — 20610 DRAIN/INJ JOINT/BURSA W/O US: CPT | Mod: LT | Performed by: FAMILY MEDICINE

## 2022-08-10 RX ORDER — LIDOCAINE HYDROCHLORIDE 10 MG/ML
4 INJECTION, SOLUTION INFILTRATION; PERINEURAL
Status: DISCONTINUED | OUTPATIENT
Start: 2022-08-10 | End: 2024-07-16

## 2022-08-10 RX ORDER — TRIAMCINOLONE ACETONIDE 40 MG/ML
40 INJECTION, SUSPENSION INTRA-ARTICULAR; INTRAMUSCULAR
Status: DISCONTINUED | OUTPATIENT
Start: 2022-08-10 | End: 2024-07-16

## 2022-08-10 RX ADMIN — TRIAMCINOLONE ACETONIDE 40 MG: 40 INJECTION, SUSPENSION INTRA-ARTICULAR; INTRAMUSCULAR at 13:51

## 2022-08-10 RX ADMIN — LIDOCAINE HYDROCHLORIDE 4 ML: 10 INJECTION, SOLUTION INFILTRATION; PERINEURAL at 13:51

## 2022-08-10 NOTE — PROGRESS NOTES
ESTABLISHED PATIENT FOLLOW-UP:  Pain of the Left Knee       HISTORY OF PRESENT ILLNESS  Mr. Ram is a pleasant 77 year old year old male who presents to clinic today for follow-up of left knee pain. Patient is interested in discussing cortisone injection today.    Date of injury: gradual onset  Date last seen: 6/17/22  Following Therapeutic Plan: Yes- PT, home exercise program   Pain: improving   Function: improving   Interval History: Patient reports constant stiffness. He has pain with knee flexion. Overall he has seen improvement since last visit.     Additional medical/Social/Surgical histories reviewed in [facility] and updated as appropriate.    PROCEDURE    Left Knee Injection - Intraarticular  The patient was informed of the risks and the benefits of the procedure and a written consent was signed.  The patient s left knee was prepped with chlorhexidine in sterile fashion.   40 mg of triamcinolone suspension was drawn up into a 5 mL syringe with 4 mL of 1% lidocaine.  Injection was performed using substerile technique.  A 1.5-inch 22-gauge needle was used to enter the lateral aspect of the left knee.  Injection performed successfully without difficulty.  There were no complications. The patient tolerated the procedure well. There was negligible bleeding.   The patient was instructed to ice the knee upon leaving clinic and refrain from overuse over the next 3 days.   The patient was instructed to call or go to the emergency room with any unusual pain, swelling, redness, or if otherwise concerned.  A follow up appointment will be scheduled to evaluate response to the injection, and to assess range of motion and pain.  Follow up in 3 months - December will be back from [place]. Consider gel injections - Durolane or Gel One if persisting.     Continue with HEP.      Large Joint Injection/Arthocentesis: L knee joint    Date/Time: 8/10/2022 1:51 PM  Performed by: Nhan Santiago DO  Authorized by: Nhan Santiago DO      Indications:  Pain  Needle Size:  22 G  Guidance: landmark guided    Approach:  Anterolateral  Location:  Knee      Medications:  4 mL lidocaine 1 %; 40 mg triamcinolone 40 MG/ML  Outcome:  Tolerated well, no immediate complications  Procedure discussed: discussed risks, benefits, and alternatives    Consent Given by:  Patient  Timeout: timeout called immediately prior to procedure    Prep: patient was prepped and draped in usual sterile fashion          It was a pleasure seeing Dinana Santiago DO, CAQSM  Primary Care Sports Medicine

## 2022-08-10 NOTE — PATIENT INSTRUCTIONS
Thank you for choosing [facility]    DR EPSTEIN'S CLINIC LOCATIONS  [place] [address] [address], [address] [place]   [place], [place], [address] [place] [address]   [phone]       APPOINTMENTS: [phone]    CARE QUESTIONS: [phone],    BILLING QUESTIONS: [phone]    FAX NUMBER: [phone]        Follow up: as needed    Steroid Injection Information:    A corticosteroid injection was administered at your visit today.  The area of injection may be sore, slightly swollen for 1-2 days afterward.  Immediately after injection, you may have an area of numbness, which is caused by the local anesthetic, lidocaine (similar to novacaine) in the shot.  This medicine will wear off in about 4 hours.  In addition to the lidocaine, a steroid medication was injected, called triamcinolone acetate.  This medication is intended to provide long-acting antiinflammatory / pain relief.  It may take 2-5 days for this medication to provide noticeable relief.      After an injection, Dr. Santiago recommends:    Protecting the area wearing a bandage or gauze pad for at least 24 hours.    Using ice; ice bag or frozen vegetables over the injection site every one to two hours when able (for 15 minutes at a time).      Avoid any strenuous activity even if the knee is already feeling better immediately afterward. Light stretching is encouraged but refrain from home exercise program and increasing activity level for about 48 hours.    Avoid soaking in a hot tub, bath, or pool for 48 hours after injection. Showering is fine!    Watch for signs of infection, including increasing pain, redness and swelling that last more than 48 hours after injection.

## 2022-08-10 NOTE — LETTER
8/10/2022         RE: Zachariah Ram  [address]  [address]  [place] [address]        Dear Colleague,    Thank you for referring your patient, Zachariah Ram, to the [facility] [place]. Please see a copy of my visit note below.    ESTABLISHED PATIENT FOLLOW-UP:  Pain of the Left Knee       HISTORY OF PRESENT ILLNESS  Mr. Ram is a pleasant 77 year old year old male who presents to clinic today for follow-up of left knee pain. Patient is interested in discussing cortisone injection today.    Date of injury: gradual onset  Date last seen: 6/17/22  Following Therapeutic Plan: Yes- PT, home exercise program   Pain: improving   Function: improving   Interval History: Patient reports constant stiffness. He has pain with knee flexion. Overall he has seen improvement since last visit.     Additional medical/Social/Surgical histories reviewed in [facility] and updated as appropriate.    PROCEDURE    Left Knee Injection - Intraarticular  The patient was informed of the risks and the benefits of the procedure and a written consent was signed.  The patient s left knee was prepped with chlorhexidine in sterile fashion.   40 mg of triamcinolone suspension was drawn up into a 5 mL syringe with 4 mL of 1% lidocaine.  Injection was performed using substerile technique.  A 1.5-inch 22-gauge needle was used to enter the lateral aspect of the left knee.  Injection performed successfully without difficulty.  There were no complications. The patient tolerated the procedure well. There was negligible bleeding.   The patient was instructed to ice the knee upon leaving clinic and refrain from overuse over the next 3 days.   The patient was instructed to call or go to the emergency room with any unusual pain, swelling, redness, or if otherwise concerned.  A follow up appointment will be scheduled to evaluate response to the injection, and to assess range of motion and pain.  Follow up in 3 months - December  will be back from [place]. Consider gel injections - Durolane or Gel One if persisting.     Continue with HEP.      Large Joint Injection/Arthocentesis: L knee joint    Date/Time: 8/10/2022 1:51 PM  Performed by: Nhan Santiago DO  Authorized by: Nhan Santiago DO     Indications:  Pain  Needle Size:  22 G  Guidance: landmark guided    Approach:  Anterolateral  Location:  Knee      Medications:  4 mL lidocaine 1 %; 40 mg triamcinolone 40 MG/ML  Outcome:  Tolerated well, no immediate complications  Procedure discussed: discussed risks, benefits, and alternatives    Consent Given by:  Patient  Timeout: timeout called immediately prior to procedure    Prep: patient was prepped and draped in usual sterile fashion          It was a pleasure seeing Dianna Santiago DO, [facility]  Primary Care Sports Medicine          Again, thank you for allowing me to participate in the care of your patient.        Sincerely,        Nhan Santiago DO

## 2022-08-16 ENCOUNTER — TRANSFERRED RECORDS (OUTPATIENT)
Dept: HEALTH INFORMATION MANAGEMENT | Facility: CLINIC | Age: 77
End: 2022-08-16

## 2022-08-22 ENCOUNTER — HOSPITAL ENCOUNTER (OUTPATIENT)
Dept: GENERAL RADIOLOGY | Facility: CLINIC | Age: 77
Discharge: HOME | End: 2022-08-22
Attending: STUDENT IN AN ORGANIZED HEALTH CARE EDUCATION/TRAINING PROGRAM | Admitting: STUDENT IN AN ORGANIZED HEALTH CARE EDUCATION/TRAINING PROGRAM
Payer: MEDICARE

## 2022-08-22 DIAGNOSIS — K44.9 PARAESOPHAGEAL HERNIA: ICD-10-CM

## 2022-08-22 DIAGNOSIS — K22.2 SCHATZKI'S RING OF DISTAL ESOPHAGUS: ICD-10-CM

## 2022-08-22 DIAGNOSIS — R13.19 ESOPHAGEAL DYSPHAGIA: ICD-10-CM

## 2022-08-22 PROCEDURE — 74240 X-RAY XM UPR GI TRC 1CNTRST: CPT

## 2022-08-24 NOTE — PROGRESS NOTES
[facility] HEART [facility]IN[facility]    I had the pleasure of seeing Zachariah when he came for follow up of HTN, pAFib.  This 77 year old sees Dr. Toure for his history of:    1. HTN   2. Paroxysmal AFib s/p ligation of the BENTON at the time of his mitral valve annuloplasty. Unfortunately, RUBÉN done 2017 did show some communication between the left atrium and left atrial appendage, and long term AC rec'd  3. AT after he reduced his flecainide from 100-50 mg twice daily and switched his atenolol to a calcium channel blocker.  He underwent RUBÉN guided cardioversion 8/2018 and was started on anticoagulation given his CHADSVASc of now 3 (hypertension, age) once RUBÉN showed communication between the left atrium and left atrial appendage.  Subsequently, has been back on flecainide since 8/2017.  No recurrence of atrial arrhythmias noted  4. MR d/t severe MVP s/p Alayna mitral valve annuloplasty 6/1996 at [facility].  5. Prostate CA - had Lupron shorts 4/2002. S/p prostatectomy 5/2020   6. CAC -CTA calcium score 5/2022 with total score 26.6, placing him in the 15th percentile c/w age/gender matched controls.  7.  Aortic dilatation -ascending aorta 3.9x3.9 cm on CT calcium score 5/2022    I last saw Zachariah 10/2020 at which time he thought flecainide was working well to control his atrial arrhythmias (PAT, pAFib).  He belonged to a [employer] club and [place] and was very activity he had been getting.    He saw Dr. Toure 8/30/2021 at which time they reviewed echo that showed good LV function. Annual follow-up rec'd.     Recently, he saw his PCP and reviewed CT calcium score that was minimally elevated 26.6.  DOLL score 10.1% (had been 17.9% without calcium score report).  Rosuvastatin started.     Interval History:  Zachariah is really feeling well, without recurrent atrial arrhythmia. Really feels good on the flecainide 100 mg twice a day and thinks it is working well.    Continues on anticoagulation without bleeding trouble.  Last CBC 5/2022 with  "slightly low hemoglobin 13.0 g/dL..    Denies any chest pain, pressure, tightness.  He remains quite active, but did not get to [place] this past year to check on how he would do while hiking.  He continues to workout on the treadmill, at least 3 mph.    No edema, orthopnea, PND.    BPs at home 120-130s on Omron cuff.    VITALS:  Vitals: /68 (BP Location: Left arm, Cuff Size: Adult Large)   Pulse 55   Ht 1.753 m (5' 9\")   Wt 83.7 kg (184 lb 8 oz)   BMI 27.25 kg/m      Diagnostic Testing:  EKG today, which I overread, showed SR 60 bpm. QRS duration 114 ms on flecainide 100 mg BID  CTA Heart CA score 26.6 (17.4LAD, 9.18 RCA).  0 LM, 0 Cx.  Non- cardiac portion with small nodularity along L major fissure, possibly minimal thickening as opposed to discrete nodule.  Similar finding noted on the R.  Echo 8/2021 with LVEF 55-60%. Nl RV. Sev dilated LA. S/p MV repair. Mean gradient 3mmHg @ 49 bpm. Ao sclerosis.  Echocardiogam 7/2019 showed EF 55-60% with no RWMA. RV mildly dilated with mildly decreased RVSF. Severely dilated CYNDI. Posterior MV leaflet fixed c/w prior MV repair. Trace-mild MR @ 53bpm, mean gradient 3.3 mmHg. Trace TR. Nl RA pressure. Mild Ao Sclerosis and trace-mild AI.  Component      Latest Ref Rng & Units 5/27/2022   Cholesterol      <200 mg/dL 190   Triglycerides      <150 mg/dL 130   HDL Cholesterol      >=40 mg/dL 63   LDL Cholesterol Calculated      <=100 mg/dL 101 (H)   Non HDL Cholesterol      <130 mg/dL 127   Patient Fasting > 8hrs?       No         Plan:  Annual follow-up with Dr. Toure as previously requested. EKG for flecainide therapy  Echocardiogram 2 years given h/o MV repair    Assessment/Plan:    1. Atrial arrhythmias    Has h/o AT and pAFib.  Remains on flecainide 100 mg BID which she thinks is working quite well. EKG as above looks good, with stable QRS duration    Remains on AC with Eliquis 5 mg BID (HTN, age).  No bleeding issues he is aware of    PLAN:    Flecainide refilled " today    Eliquis refilled today    Will see Dr. Toure 1 year based on Dr. Toure's last note.  We will get EKG at that time to assess QRS on flecainide    2. H/o MV repair    Echo 8/2021 as above with LVEF 55 to 60%. Mean MV gradient 3 mmHg@49 bpm with just trace MR    On exam, remains euvolemic with no significant murmur    PLAN:    Plan echocardiogram 2024    3. HTN    Remains on losartan 50 mg BID with good results    PLAN:    Continue medication.  Losartan refilled today    4. Coronary calcification    Dr. Reyes ordered a CT calcium score to help delineate risk, which showed minimal CAC (26), mostly in RCA and LAD    Lipid panel 5/2022 as above.  Dr. Reyes started him on Crestor 20 mg daily       PLAN:  - I do not see that a stress test has been performed. Given he is on flecainide and has coronary calcification, we will plan exercise nuclear stress test on flecainide        Nakita Gayle PA-C, MSPAS      Orders Placed This Encounter   Procedures     Follow-Up with Cardiology EP     Follow-Up with Cardiology TED     EKG 12-lead complete w/read - Clinics (performed today)     Echocardiogram Complete     Orders Placed This Encounter   Medications     cholecalciferol (VITAMIN D3) 25 mcg (1000 units) capsule     Sig: Take 2 capsules by mouth daily     omeprazole 20 MG tablet     Sig: Take 20 mg by mouth daily     DISCONTD: losartan (COZAAR) 50 MG tablet     Sig: Take 1 tablet (50 mg) by mouth 2 times daily     apixaban ANTICOAGULANT (ELIQUIS ANTICOAGULANT) 5 MG tablet     Sig: Take 1 tablet (5 mg) by mouth 2 times daily     Dispense:  180 tablet     Refill:  3     flecainide (TAMBOCOR) 100 MG tablet     Sig: Take 1 tablet (100 mg) by mouth 2 times daily     Dispense:  180 tablet     Refill:  3     losartan (COZAAR) 50 MG tablet     Sig: Take 1 tablet (50 mg) by mouth 2 times daily     Dispense:  180 tablet     Refill:  3     Keep on file until pt is due for refill     Medications Discontinued During This Encounter    Medication Reason     rosuvastatin (CRESTOR) 20 MG tablet Medication Reconciliation Clean Up     losartan (COZAAR) 50 MG tablet Reorder     famotidine (PEPCID) 20 MG tablet Stopped by Patient     apixaban ANTICOAGULANT (ELIQUIS ANTICOAGULANT) 5 MG tablet Reorder     flecainide (TAMBOCOR) 100 MG tablet Reorder     losartan (COZAAR) 50 MG tablet Reorder         Encounter Diagnoses   Name Primary?     Paroxysmal atrial fibrillation (H)      Benign essential hypertension      Atrial flutter, unspecified type (H)      Encounter for monitoring flecainide therapy Yes     H/O mitral valve disease      H/O mitral valve repair        CURRENT MEDICATIONS:  Current Outpatient Medications   Medication Sig Dispense Refill     amoxicillin (AMOXIL) 500 MG capsule Prior to dentist 2000mg [30-60 minutes prior to procedure] 12 capsule 1     apixaban ANTICOAGULANT (ELIQUIS ANTICOAGULANT) 5 MG tablet Take 1 tablet (5 mg) by mouth 2 times daily 180 tablet 3     Ascorbic Acid (VITAMIN C) 500 MG CAPS Take 500 mg by mouth daily 500 mg in Am and 1,000 mg in PM       calcium citrate-vitamin D (CITRACAL) 315-200 MG-UNIT TABS per tablet 2 tablets daily        cholecalciferol (VITAMIN D3) 25 mcg (1000 units) capsule Take 2 capsules by mouth daily       flecainide (TAMBOCOR) 100 MG tablet Take 1 tablet (100 mg) by mouth 2 times daily 180 tablet 3     hydrocortisone (CORTAID) 0.5 % external cream Apply topically daily as needed       ketoconazole (NIZORAL) 2 % cream Apply topically daily as needed for itching        ketoconazole (NIZORAL) 2 % external shampoo Apply topically every other day 100 mL 0     latanoprost (XALATAN) 0.005 % ophthalmic solution Place 1 drop into both eyes daily       levothyroxine (LEVOXYL) 100 MCG tablet Take 1 tablet (100 mcg) by mouth daily 90 tablet 0     losartan (COZAAR) 50 MG tablet Take 1 tablet (50 mg) by mouth 2 times daily 180 tablet 3     magnesium 200 MG TABS Take 250 mg by mouth daily       Misc Natural  "Products (GLUCOSAMINE CHOND MSM FORMULA PO) Take 1 tablet by mouth daily        Multiple Vitamins-Minerals (VITRUM 50+ SENIOR MULTI) TABS Take by mouth daily       NEW MED Trimix # 4  Papaverine 27.6 mg/mL  Phentolamine 1 mg/mL  Alprostadil 40 mcg/mL  Sig: inject 0.2 mLs intracavernous daily as needed. Allow 24 hours between injections. Use no more than three time weekly  May increase in increments of 0.1 mL up 0.8 mL 5 mL 4     omeprazole 20 MG tablet Take 20 mg by mouth daily       Potassium Gluconate 595 MG CAPS Take 595 mg by mouth daily       rosuvastatin (CRESTOR) 20 MG tablet Take 20 mg by mouth daily       tadalafil (CIALIS) 5 MG tablet Take 1 tablet (5 mg) by mouth daily as needed (Erectile dysfunction) 30 tablet 11     tretinoin (RETIN-A) 0.05 % external cream Apply topically At Bedtime         ALLERGIES     Allergies   Allergen Reactions     Amlodipine Swelling     Noted at 5 mg     Extract Of Poison Ivy      Lisinopril Cough         Review of Systems:  Skin:  Negative     Eyes:  Positive for glasses  ENT:  Negative    Respiratory:  Negative for shortness of breath;dyspnea on exertion  Cardiovascular:  Negative for;palpitations;chest pain;fatigue;dizziness;edema    Gastroenterology: Negative melena;hematochezia  Genitourinary:  Positive for prostate problem  Musculoskeletal:  Negative foot pain  Neurologic:  Negative    Psychiatric:  Negative    Heme/Lymph/Imm:  Positive for allergies  Endocrine:  Positive for thyroid disorder    Physical Exam:  Vitals: /68 (BP Location: Left arm, Cuff Size: Adult Large)   Pulse 55   Ht 1.753 m (5' 9\")   Wt 83.7 kg (184 lb 8 oz)   BMI 27.25 kg/m      Constitutional:  cooperative, alert and oriented, well developed, well nourished, in no acute distress        Skin:  warm and dry to the touch, no apparent skin lesions or masses noted        Head:  normocephalic, no masses or lesions        Eyes:  conjunctivae and lids unremarkable;sclera white;no xanthalasma    "     ENT:  not assessed this visit        Neck:  JVP normal        Chest:  normal breath sounds, clear to auscultation, normal A-P diameter, normal symmetry, normal respiratory excursion, no use of accessory muscles        Cardiac: regular rhythm, normal S1/S2, no S3 or S4, apical impulse not displaced, no murmurs, gallops or rubs bradycardic                Abdomen:  abdomen soft        Vascular: pulses full and equal, no bruits auscultated                                      Extremities and Back:  no deformities, clubbing, cyanosis, erythema observed        Neurological:  no gross motor deficits            PAST MEDICAL HISTORY:  Past Medical History:   Diagnosis Date     Antiplatelet or antithrombotic long-term use      Arrhythmia     A FIB     Atrial flutter (H)      BPH (benign prostatic hyperplasia)      ED (erectile dysfunction)      Elevated prostate specific antigen (PSA)      GERD (gastroesophageal reflux disease)      Heart murmur     MVP     History of blood transfusion 1996 [place] open heart surgery.    Repaired mitral valve.     History of skin cancer      Hyperlipidemia      Hypertension      Insomnia      Left inguinal hernia      Lentigo maligna melanoma (H)      MVP (mitral valve prolapse)      Nasal congestion      Obstructive sleep apnea     SEVERE    -  USES CPAP     Paroxysmal atrial fibrillation (H)      Schatzki's ring      Seborrheic dermatitis      Smoking history     quit 1973     Thyroid disease Partial thyroidectomy 2008, benign tumor.    Thyroid controlled by levothyroxine.     Urinary frequency        PAST SURGICAL HISTORY:  Past Surgical History:   Procedure Laterality Date     ABDOMEN SURGERY  Hernia right side 1994.  Prostatectomy [place] 5/20    Normal side effects.     BIOPSY  For various skin cancers, prostate.    Treatment completed at the time.     CARDIOVERSION  08/07/2017     COLONOSCOPY  2015     at [facility] ([employer], [place], [place])     EGD, GASTROESOPHAGEAL  REFLUX TEST WITH MUCOSAL PH ELECTRODE       ENT SURGERY  Partial parotidectomy     Benign tumor.     GENITOURINARY SURGERY  Prostatectomy [place] .    Usual side effects.     HC KNEE SCOPE,MED/LAT MENISCUS REPAIR Left 2016     HERNIA REPAIR  1994     HERNIORRHAPHY INGUINAL Left 2021    Procedure: OPEN LEFT INGUINAL HERNIA REPAIR;  Surgeon: Bernard Caballero MD;  Location:  OR     LASIK       Left atrial appendage ligation       MR PROSTATE  W/O & W CONTRAST  ,      PAROTIDECTOMY      partial parotidectomy     REPAIR VALVE MITRAL       SKIN CANCER SCREENING      Skin cancer surgeries-- basal on ear , melanoma on left sideburn , carcinoma right arm 2013     THYROIDECTOMY  2008    partial thyroidectomy       FAMILY HISTORY:  Family History   Problem Relation Age of Onset     Coronary Artery Disease Early Onset Father      Coronary Artery Disease Father         Problems age 50,  age 66.     Diabetes Brother         Since est age 50, overweight then.     Coronary Artery Disease Brother         AFib,etc.  [place] pace maker/defibrulator     Diabetes Maternal Grandfather      Coronary Artery Disease Brother         Mitral valve repaire age 60     Other Cancer Sister          age 40,      Other Cancer Paternal Grandfather         Cancer of the mouth,  age 66.     Cerebrovascular Disease Paternal Grandfather         Age 66, passed 10 days       SOCIAL HISTORY:  Social History     Socioeconomic History     Marital status:      Spouse name: None     Number of children: None     Years of education: None     Highest education level: None   Tobacco Use     Smoking status: Never Smoker     Smokeless tobacco: Never Used   Substance and Sexual Activity     Alcohol use: Yes     Comment: 3-4 ounces of venkat with water 2 per week.     Drug use: Never     Sexual activity: Yes     Partners: Female     Birth control/protection: Post-menopausal     Comment: ED from  prostatectomy, work in progress.   Other Topics Concern     Parent/sibling w/ CABG, MI or angioplasty before 65F 55M? No       CC  Juan Isaac Toure MD  [address]  [place] [id number]

## 2022-08-30 ENCOUNTER — OFFICE VISIT (OUTPATIENT)
Dept: CARDIOLOGY | Facility: CLINIC | Age: 77
End: 2022-08-30
Payer: MEDICARE

## 2022-08-30 VITALS
SYSTOLIC BLOOD PRESSURE: 131 MMHG | HEIGHT: 69 IN | DIASTOLIC BLOOD PRESSURE: 68 MMHG | BODY MASS INDEX: 27.33 KG/M2 | WEIGHT: 184.5 LBS | HEART RATE: 55 BPM

## 2022-08-30 DIAGNOSIS — I10 BENIGN ESSENTIAL HYPERTENSION: ICD-10-CM

## 2022-08-30 DIAGNOSIS — Z98.890 H/O MITRAL VALVE REPAIR: ICD-10-CM

## 2022-08-30 DIAGNOSIS — I48.0 PAROXYSMAL ATRIAL FIBRILLATION (H): ICD-10-CM

## 2022-08-30 DIAGNOSIS — Z79.899 ENCOUNTER FOR MONITORING FLECAINIDE THERAPY: Primary | ICD-10-CM

## 2022-08-30 DIAGNOSIS — Z86.79 H/O MITRAL VALVE DISEASE: ICD-10-CM

## 2022-08-30 DIAGNOSIS — I48.92 ATRIAL FLUTTER, UNSPECIFIED TYPE (H): ICD-10-CM

## 2022-08-30 DIAGNOSIS — Z51.81 ENCOUNTER FOR MONITORING FLECAINIDE THERAPY: Primary | ICD-10-CM

## 2022-08-30 PROCEDURE — 99214 OFFICE O/P EST MOD 30 MIN: CPT | Performed by: PHYSICIAN ASSISTANT

## 2022-08-30 PROCEDURE — 93000 ELECTROCARDIOGRAM COMPLETE: CPT | Performed by: PHYSICIAN ASSISTANT

## 2022-08-30 RX ORDER — FLECAINIDE ACETATE 100 MG/1
100 TABLET ORAL 2 TIMES DAILY
Qty: 180 TABLET | Refills: 3 | Status: SHIPPED | OUTPATIENT
Start: 2022-08-30 | End: 2023-09-25

## 2022-08-30 RX ORDER — NICOTINE POLACRILEX 4 MG/1
20 GUM, CHEWING ORAL DAILY
COMMUNITY
End: 2023-06-19

## 2022-08-30 RX ORDER — LOSARTAN POTASSIUM 50 MG/1
50 TABLET ORAL 2 TIMES DAILY
COMMUNITY
Start: 2022-08-30 | End: 2022-08-30

## 2022-08-30 RX ORDER — LOSARTAN POTASSIUM 50 MG/1
50 TABLET ORAL 2 TIMES DAILY
Qty: 180 TABLET | Refills: 3 | Status: SHIPPED | OUTPATIENT
Start: 2022-08-30 | End: 2023-09-25

## 2022-08-30 NOTE — PATIENT INSTRUCTIONS
Zachariah - it was good to see you today!    Reviewed that flecainide has worked well to control AFib, which is great news!  Reviewed that you've had to cut down on exercise a bit (no hiking in [place]) - glad stretching is helping the tightness!  Discussed that you're now on cholesterol medication    PLAN:  Continue flecainide 100 mg twice daily and Eliquis 5 mg twice daily. I sent Rxs in  See Dr. Toure in ~1 year with EKG  CALL if issues [phone]

## 2022-08-30 NOTE — LETTER
8/30/2022    Edu Reyes MD  [address]  [facility] [address]    RE: Zachariah RACHEL Yo       Dear Colleague,     I had the pleasure of seeing Zachariah Ram in the [facility].  [facility]    I had the pleasure of seeing Zachariah when he came for follow up of HTN, pAFib.  This 77 year old sees Dr. Toure for his history of:    1. HTN   2. Paroxysmal AFib s/p ligation of the BENTON at the time of his mitral valve annuloplasty. Unfortunately, RUBÉN done 2017 did show some communication between the left atrium and left atrial appendage, and long term AC rec'd  3. AT after he reduced his flecainide from 100-50 mg twice daily and switched his atenolol to a calcium channel blocker.  He underwent RUBÉN guided cardioversion 8/2018 and was started on anticoagulation given his CHADSVASc of now 3 (hypertension, age) once RUBÉN showed communication between the left atrium and left atrial appendage.  Subsequently, has been back on flecainide since 8/2017.  No recurrence of atrial arrhythmias noted  4. MR d/t severe MVP s/p Alayna mitral valve annuloplasty 6/1996 at [facility].  5. Prostate CA - had Lupron shorts 4/2002. S/p prostatectomy 5/2020   6. CAC -CTA calcium score 5/2022 with total score 26.6, placing him in the 15th percentile c/w age/gender matched controls.  7.  Aortic dilatation -ascending aorta 3.9x3.9 cm on CT calcium score 5/2022    I last saw Zachariah 10/2020 at which time he thought flecainide was working well to control his atrial arrhythmias (PAT, pAFib).  He belonged to a [employer] club and [place] and was very activity he had been getting.    He saw Dr. Toure 8/30/2021 at which time they reviewed echo that showed good LV function. Annual follow-up rec'd.     Recently, he saw his PCP and reviewed CT calcium score that was minimally elevated 26.6.  DOLL score 10.1% (had been 17.9% without calcium score report).  Rosuvastatin started.     Interval History:  Zachariah is really feeling  "well, without recurrent atrial arrhythmia. Really feels good on the flecainide 100 mg twice a day and thinks it is working well.    Continues on anticoagulation without bleeding trouble.  Last CBC 5/2022 with slightly low hemoglobin 13.0 g/dL..    Denies any chest pain, pressure, tightness.  He remains quite active, but did not get to [place] this past year to check on how he would do while hiking.  He continues to workout on the treadmill, at least 3 mph.    No edema, orthopnea, PND.    BPs at home 120-130s on Omron cuff.    VITALS:  Vitals: /68 (BP Location: Left arm, Cuff Size: Adult Large)   Pulse 55   Ht 1.753 m (5' 9\")   Wt 83.7 kg (184 lb 8 oz)   BMI 27.25 kg/m      Diagnostic Testing:  EKG today, which I overread, showed SR 60 bpm. QRS duration 114 ms on flecainide 100 mg BID  CTA Heart CA score 26.6 (17.4LAD, 9.18 RCA).  0 LM, 0 Cx.  Non- cardiac portion with small nodularity along L major fissure, possibly minimal thickening as opposed to discrete nodule.  Similar finding noted on the R.  Echo 8/2021 with LVEF 55-60%. Nl RV. Sev dilated LA. S/p MV repair. Mean gradient 3mmHg @ 49 bpm. Ao sclerosis.  Echocardiogam 7/2019 showed EF 55-60% with no RWMA. RV mildly dilated with mildly decreased RVSF. Severely dilated CYNDI. Posterior MV leaflet fixed c/w prior MV repair. Trace-mild MR @ 53bpm, mean gradient 3.3 mmHg. Trace TR. Nl RA pressure. Mild Ao Sclerosis and trace-mild AI.  Component      Latest Ref Rng & Units 5/27/2022   Cholesterol      <200 mg/dL 190   Triglycerides      <150 mg/dL 130   HDL Cholesterol      >=40 mg/dL 63   LDL Cholesterol Calculated      <=100 mg/dL 101 (H)   Non HDL Cholesterol      <130 mg/dL 127   Patient Fasting > 8hrs?       No         Plan:  Annual follow-up with Dr. Toure as previously requested. EKG for flecainide therapy  Echocardiogram 2 years given h/o MV repair    Assessment/Plan:    1. Atrial arrhythmias    Has h/o AT and pAFib.  Remains on flecainide 100 mg BID " which she thinks is working quite well. EKG as above looks good, with stable QRS duration    Remains on AC with Eliquis 5 mg BID (HTN, age).  No bleeding issues he is aware of    PLAN:    Flecainide refilled today    Eliquis refilled today    Will see Dr. Toure 1 year based on Dr. Toure's last note.  We will get EKG at that time to assess QRS on flecainide    2. H/o MV repair    Echo 8/2021 as above with LVEF 55 to 60%. Mean MV gradient 3 mmHg@49 bpm with just trace MR    On exam, remains euvolemic with no significant murmur    PLAN:    Plan echocardiogram 2024    3. HTN    Remains on losartan 50 mg BID with good results    PLAN:    Continue medication.  Losartan refilled today    4. Coronary calcification    Dr. Reyes ordered a CT calcium score to help delineate risk, which showed minimal CAC (26), mostly in RCA and LAD    Lipid panel 5/2022 as above.  Dr. Reyes started him on Crestor 20 mg daily       PLAN:  - I do not see that a stress test has been performed. Given he is on flecainide and has coronary calcification, we will plan exercise nuclear stress test on flecainide        Nakita Gayle PA-C, MSPAS      Orders Placed This Encounter   Procedures     Follow-Up with Cardiology EP     Follow-Up with Cardiology TED     EKG 12-lead complete w/read - Clinics (performed today)     Echocardiogram Complete     Orders Placed This Encounter   Medications     cholecalciferol (VITAMIN D3) 25 mcg (1000 units) capsule     Sig: Take 2 capsules by mouth daily     omeprazole 20 MG tablet     Sig: Take 20 mg by mouth daily     DISCONTD: losartan (COZAAR) 50 MG tablet     Sig: Take 1 tablet (50 mg) by mouth 2 times daily     apixaban ANTICOAGULANT (ELIQUIS ANTICOAGULANT) 5 MG tablet     Sig: Take 1 tablet (5 mg) by mouth 2 times daily     Dispense:  180 tablet     Refill:  3     flecainide (TAMBOCOR) 100 MG tablet     Sig: Take 1 tablet (100 mg) by mouth 2 times daily     Dispense:  180 tablet     Refill:  3     losartan (COZAAR)  50 MG tablet     Sig: Take 1 tablet (50 mg) by mouth 2 times daily     Dispense:  180 tablet     Refill:  3     Keep on file until pt is due for refill     Medications Discontinued During This Encounter   Medication Reason     rosuvastatin (CRESTOR) 20 MG tablet Medication Reconciliation Clean Up     losartan (COZAAR) 50 MG tablet Reorder     famotidine (PEPCID) 20 MG tablet Stopped by Patient     apixaban ANTICOAGULANT (ELIQUIS ANTICOAGULANT) 5 MG tablet Reorder     flecainide (TAMBOCOR) 100 MG tablet Reorder     losartan (COZAAR) 50 MG tablet Reorder         Encounter Diagnoses   Name Primary?     Paroxysmal atrial fibrillation (H)      Benign essential hypertension      Atrial flutter, unspecified type (H)      Encounter for monitoring flecainide therapy Yes     H/O mitral valve disease      H/O mitral valve repair        CURRENT MEDICATIONS:  Current Outpatient Medications   Medication Sig Dispense Refill     amoxicillin (AMOXIL) 500 MG capsule Prior to dentist 2000mg [30-60 minutes prior to procedure] 12 capsule 1     apixaban ANTICOAGULANT (ELIQUIS ANTICOAGULANT) 5 MG tablet Take 1 tablet (5 mg) by mouth 2 times daily 180 tablet 3     Ascorbic Acid (VITAMIN C) 500 MG CAPS Take 500 mg by mouth daily 500 mg in Am and 1,000 mg in PM       calcium citrate-vitamin D (CITRACAL) 315-200 MG-UNIT TABS per tablet 2 tablets daily        cholecalciferol (VITAMIN D3) 25 mcg (1000 units) capsule Take 2 capsules by mouth daily       flecainide (TAMBOCOR) 100 MG tablet Take 1 tablet (100 mg) by mouth 2 times daily 180 tablet 3     hydrocortisone (CORTAID) 0.5 % external cream Apply topically daily as needed       ketoconazole (NIZORAL) 2 % cream Apply topically daily as needed for itching        ketoconazole (NIZORAL) 2 % external shampoo Apply topically every other day 100 mL 0     latanoprost (XALATAN) 0.005 % ophthalmic solution Place 1 drop into both eyes daily       levothyroxine (LEVOXYL) 100 MCG tablet Take 1 tablet  "(100 mcg) by mouth daily 90 tablet 0     losartan (COZAAR) 50 MG tablet Take 1 tablet (50 mg) by mouth 2 times daily 180 tablet 3     magnesium 200 MG TABS Take 250 mg by mouth daily       Misc Natural Products (GLUCOSAMINE CHOND MSM FORMULA PO) Take 1 tablet by mouth daily        Multiple Vitamins-Minerals (VITRUM 50+ SENIOR MULTI) TABS Take by mouth daily       NEW MED Trimix # 4  Papaverine 27.6 mg/mL  Phentolamine 1 mg/mL  Alprostadil 40 mcg/mL  Sig: inject 0.2 mLs intracavernous daily as needed. Allow 24 hours between injections. Use no more than three time weekly  May increase in increments of 0.1 mL up 0.8 mL 5 mL 4     omeprazole 20 MG tablet Take 20 mg by mouth daily       Potassium Gluconate 595 MG CAPS Take 595 mg by mouth daily       rosuvastatin (CRESTOR) 20 MG tablet Take 20 mg by mouth daily       tadalafil (CIALIS) 5 MG tablet Take 1 tablet (5 mg) by mouth daily as needed (Erectile dysfunction) 30 tablet 11     tretinoin (RETIN-A) 0.05 % external cream Apply topically At Bedtime         ALLERGIES     Allergies   Allergen Reactions     Amlodipine Swelling     Noted at 5 mg     Extract Of Poison Ivy      Lisinopril Cough         Review of Systems:  Skin:  Negative     Eyes:  Positive for glasses  ENT:  Negative    Respiratory:  Negative for shortness of breath;dyspnea on exertion  Cardiovascular:  Negative for;palpitations;chest pain;fatigue;dizziness;edema    Gastroenterology: Negative melena;hematochezia  Genitourinary:  Positive for prostate problem  Musculoskeletal:  Negative foot pain  Neurologic:  Negative    Psychiatric:  Negative    Heme/Lymph/Imm:  Positive for allergies  Endocrine:  Positive for thyroid disorder    Physical Exam:  Vitals: /68 (BP Location: Left arm, Cuff Size: Adult Large)   Pulse 55   Ht 1.753 m (5' 9\")   Wt 83.7 kg (184 lb 8 oz)   BMI 27.25 kg/m      Constitutional:  cooperative, alert and oriented, well developed, well nourished, in no acute distress        Skin: "  warm and dry to the touch, no apparent skin lesions or masses noted        Head:  normocephalic, no masses or lesions        Eyes:  conjunctivae and lids unremarkable;sclera white;no xanthalasma        ENT:  not assessed this visit        Neck:  JVP normal        Chest:  normal breath sounds, clear to auscultation, normal A-P diameter, normal symmetry, normal respiratory excursion, no use of accessory muscles        Cardiac: regular rhythm, normal S1/S2, no S3 or S4, apical impulse not displaced, no murmurs, gallops or rubs bradycardic                Abdomen:  abdomen soft        Vascular: pulses full and equal, no bruits auscultated                                      Extremities and Back:  no deformities, clubbing, cyanosis, erythema observed        Neurological:  no gross motor deficits            PAST MEDICAL HISTORY:  Past Medical History:   Diagnosis Date     Antiplatelet or antithrombotic long-term use      Arrhythmia     A FIB     Atrial flutter (H)      BPH (benign prostatic hyperplasia)      ED (erectile dysfunction)      Elevated prostate specific antigen (PSA)      GERD (gastroesophageal reflux disease)      Heart murmur     MVP     History of blood transfusion 1996 [facility] open heart surgery.    Repaired mitral valve.     History of skin cancer      Hyperlipidemia      Hypertension      Insomnia      Left inguinal hernia      Lentigo maligna melanoma (H)      MVP (mitral valve prolapse)      Nasal congestion      Obstructive sleep apnea     SEVERE    -  USES CPAP     Paroxysmal atrial fibrillation (H)      Schatzki's ring      Seborrheic dermatitis      Smoking history     quit 1973     Thyroid disease Partial thyroidectomy 2008, benign tumor.    Thyroid controlled by levothyroxine.     Urinary frequency        PAST SURGICAL HISTORY:  Past Surgical History:   Procedure Laterality Date     ABDOMEN SURGERY  Hernia right side 1994.  Prostatectomy [facility] 5/20    Normal side effects.     BIOPSY  For various  skin cancers, prostate.    Treatment completed at the time.     CARDIOVERSION  2017     COLONOSCOPY       at [facility] ([facility], [place], [place])     EGD, GASTROESOPHAGEAL REFLUX TEST WITH MUCOSAL PH ELECTRODE       ENT SURGERY  Partial parotidectomy     Benign tumor.     GENITOURINARY SURGERY  Prostatectomy [place] .    Usual side effects.     HC KNEE SCOPE,MED/LAT MENISCUS REPAIR Left 2016     HERNIA REPAIR  1994     HERNIORRHAPHY INGUINAL Left 2021    Procedure: OPEN LEFT INGUINAL HERNIA REPAIR;  Surgeon: Bernard Caballero MD;  Location: [facility] OR     LASIK       Left atrial appendage ligation       MR PROSTATE  W/O & W CONTRAST  ,      PAROTIDECTOMY      partial parotidectomy     REPAIR VALVE MITRAL       SKIN CANCER SCREENING      Skin cancer surgeries-- basal on ear , melanoma on left sideburn , carcinoma right arm      THYROIDECTOMY      partial thyroidectomy       FAMILY HISTORY:  Family History   Problem Relation Age of Onset     Coronary Artery Disease Early Onset Father      Coronary Artery Disease Father         Problems age 50,  age 66.     Diabetes Brother         Since est age 50, overweight then.     Coronary Artery Disease Brother         AFib,etc.  [place] pace maker/defibrulator     Diabetes Maternal Grandfather      Coronary Artery Disease Brother         Mitral valve repaire age 60     Other Cancer Sister          age 40,      Other Cancer Paternal Grandfather         Cancer of the mouth,  age 66.     Cerebrovascular Disease Paternal Grandfather         Age 66, passed 10 days       SOCIAL HISTORY:  Social History     Socioeconomic History     Marital status:      Spouse name: None     Number of children: None     Years of education: None     Highest education level: None   Tobacco Use     Smoking status: Never Smoker     Smokeless tobacco: Never Used   Substance and Sexual Activity     Alcohol use: Yes      Comment: 3-4 ounces of venkat with water 2 per week.     Drug use: Never     Sexual activity: Yes     Partners: Female     Birth control/protection: Post-menopausal     Comment: ED from prostatectomy, work in progress.   Other Topics Concern     Parent/sibling w/ CABG, MI or angioplasty before 65F 55M? No       CC  Juan Isaac Toure MD  [address]  [place] [address]        Thank you for allowing me to participate in the care of your patient.      Sincerely,     TOMASZ Tobar [facility]

## 2022-08-31 ENCOUNTER — TRANSFERRED RECORDS (OUTPATIENT)
Dept: HEALTH INFORMATION MANAGEMENT | Facility: CLINIC | Age: 77
End: 2022-08-31

## 2022-09-09 ENCOUNTER — LAB (OUTPATIENT)
Dept: LAB | Facility: CLINIC | Age: 77
End: 2022-09-09
Payer: MEDICARE

## 2022-09-09 DIAGNOSIS — Z11.59 NEED FOR HEPATITIS C SCREENING TEST: ICD-10-CM

## 2022-09-09 DIAGNOSIS — E78.5 HYPERLIPIDEMIA, UNSPECIFIED HYPERLIPIDEMIA TYPE: ICD-10-CM

## 2022-09-09 PROCEDURE — 36415 COLL VENOUS BLD VENIPUNCTURE: CPT

## 2022-09-09 PROCEDURE — 86803 HEPATITIS C AB TEST: CPT

## 2022-09-09 PROCEDURE — 80061 LIPID PANEL: CPT

## 2022-09-09 PROCEDURE — 84450 TRANSFERASE (AST) (SGOT): CPT

## 2022-09-10 LAB
AST SERPL W P-5'-P-CCNC: 23 U/L (ref 0–45)
CHOLEST SERPL-MCNC: 120 MG/DL
FASTING STATUS PATIENT QL REPORTED: NO
HCV AB SERPL QL IA: NONREACTIVE
HDLC SERPL-MCNC: 62 MG/DL
LDLC SERPL CALC-MCNC: 37 MG/DL
NONHDLC SERPL-MCNC: 58 MG/DL
TRIGL SERPL-MCNC: 107 MG/DL

## 2022-09-14 DIAGNOSIS — E03.9 HYPOTHYROIDISM, UNSPECIFIED TYPE: ICD-10-CM

## 2022-09-19 RX ORDER — LEVOTHYROXINE SODIUM 100 UG/1
TABLET ORAL
Qty: 90 TABLET | Refills: 2 | Status: SHIPPED | OUTPATIENT
Start: 2022-09-19 | End: 2022-12-05

## 2022-10-10 ENCOUNTER — TRANSFERRED RECORDS (OUTPATIENT)
Dept: HEALTH INFORMATION MANAGEMENT | Facility: CLINIC | Age: 77
End: 2022-10-10

## 2022-10-15 ENCOUNTER — NURSE TRIAGE (OUTPATIENT)
Dept: NURSING | Facility: CLINIC | Age: 77
End: 2022-10-15

## 2022-10-15 NOTE — TELEPHONE ENCOUNTER
"Pt tested positive for COVID today 10/15/22 and has questions re:  Symptom management and antiviral.    Coronavirus (COVID-19) Notification    Caller Name (Patient, parent, daughter/son, grandparent, etc)  Patient    Reason for call  Notify of Positive Coronavirus (COVID-19) lab results, assess symptoms,  review  [employer] [place] recommendations    Lab Result    Lab test:  2019-nCoV rRt-PCR or SARS-CoV-2 PCR    Oropharyngeal AND/OR nasopharyngeal swabs is POSITIVE for 2019-nCoV RNA/SARS-COV-2 PCR (COVID-19 virus)    Brief introduction script  Introduce self then review script:  \"I am calling on behalf of [facility]..  We were notified that your Coronavirus test (COVID-19) for was POSITIVE for the virus.\"    Gather patient reported symptoms   Assessment   Current Symptoms at time of phone call, reported by patient: (if no symptoms, document No symptoms] Cough   Date of Symptom(s) onset (if applicable) 10/15/22     If at time of call, Patients symptoms hare worsened, the Patient should contact 911 or have someone drive them to Emergency Dept promptly:      If Patient calling 911, inform 911 personal that you have tested positive for the Coronavirus (COVID-19).  Place mask on and await 911 to arrive.    If Emergency Dept, If possible, please have another adult drive you to the Emergency Dept but you need to wear mask when in contact with other people.      Monoclonal Antibody Administration    You may be eligible to receive a new treatment with a monoclonal antibody for preventing hospitalization in patients at high risk for complications from COVID-19. This medication is still experimental and available on a limited basis; it is given through an IV and must be given at an infusion center. Please note that not all people who are eligible will receive the medication since it is in limited supply.  Is the patient symptomatic and onset of symptoms within the last 7 days?  Yes  Is the patient interested in a visit " with a provider to discuss treatment options?: Yes  Is the patient seen at [facility]?  No: Warm transfer caller to [phone] to be scheduled with a virtual urgent provider.  During transfer, instruct  on appropriate time frame for visit     Review information with Patient    Your result was positive. This means you have COVID-19 (coronavirus).      How can I protect others?    These guidelines are for isolating before returning to work, school or .       If you DO have symptoms:  o Stay home and away from others  - For at least 5 days after your symptoms started, AND   - You are fever free for 24 hours (with no medicine that reduces fever), AND  - Your other symptoms are better.  o Wear a mask for 10 full days any time you are around others.    If you DON'T have symptoms:  o Stay at home and away from others for at least 5 days after your positive test.  o Wear a mask for 10 full days any time you are around others.    There may be different guidelines for healthcare facilities. Please check with the specific sites before arriving.     If you've been told by a doctor that you were severely ill with COVID-19 or are immunocompromised, you should isolate for at least 10 days.    You should not go back to work until you meet the guidelines above for ending your home isolation. You don't need to be retested for COVID-19 before going back to work--studies show that you won't spread the virus if it's been at least 10 days since your symptoms started (or 20 days, if you have a weak immune system).    Employers, schools, and daycares: This is an official notice for this person's medical guidelines for returning in-person. They must meet the above guidelines before going back to work, school, or  in person.    You will receive a positive COVID-19 letter via [employer] or the mail soon with additional self-care information.      Would you like me to review some of that information with you  now?  Yes    How can I take care of myself?      Get lots of rest. Drink extra fluids (unless a doctor has told you not to).      Take Tylenol (acetaminophen) for fever or pain. If you have liver or kidney problems, ask your family doctor if it's okay to take Tylenol.     Take either:     650 mg (two 325 mg pills) every 4 to 6 hours, or     1,000 mg (two 500 mg pills) every 8 hours as needed.     Note: Do not take more than 3,000 mg in one day. Acetaminophen is found in many medicines (both prescribed and over-the-counter medicines). Read all labels to be sure you don't take too much.    For children, check the Tylenol bottle for the right dose (based on their age or weight).      If you have other health problems (like cancer, heart failure, an organ transplant or severe kidney disease): Call your specialty clinic if you don't feel better in the next 2 days.      Know when to call 911: Emergency warning signs include:    Trouble breathing or shortness of breath    Pain or pressure in the chest that doesn't go away    Feeling confused like you haven't felt before, or not being able to wake up    Bluish-colored lips or face        If you were tested for an upcoming procedure, please contact your provider for next steps.     Shante Freire    Reason for Disposition    [1] COVID-19 diagnosed by positive lab test (e.g., PCR, rapid self-test kit) AND [2] mild symptoms (e.g., cough, fever, others) AND [3] no complications or SOB    Additional Information    Negative: SEVERE difficulty breathing (e.g., struggling for each breath, speaks in single words)    Negative: Difficult to awaken or acting confused (e.g., disoriented, slurred speech)    Negative: Bluish (or gray) lips or face now    Negative: Shock suspected (e.g., cold/pale/clammy skin, too weak to stand, low BP, rapid pulse)    Negative: Sounds like a life-threatening emergency to the triager    Negative: [1] Diagnosed or suspected COVID-19 AND [2] symptoms lasting  3 or more weeks    Negative: [1] COVID-19 exposure AND [2] no symptoms    Negative: COVID-19 vaccine reaction suspected (e.g., fever, headache, muscle aches) occurring 1 to 3 days after getting vaccine    Negative: COVID-19 vaccine, questions about    Negative: [1] Lives with someone known to have influenza (flu test positive) AND [2] flu-like symptoms (e.g., cough, runny nose, sore throat, SOB; with or without fever)    Negative: [1] Adult with possible COVID-19 symptoms AND [2] triager concerned about severity of symptoms or other causes    Negative: COVID-19 and breastfeeding, questions about    Negative: SEVERE or constant chest pain or pressure  (Exception: Mild central chest pain, present only when coughing.)    Negative: MODERATE difficulty breathing (e.g., speaks in phrases, SOB even at rest, pulse 100-120)    Negative: [1] Headache AND [2] stiff neck (can't touch chin to chest)    Negative: Oxygen level (e.g., pulse oximetry) 90 percent or lower    Negative: Chest pain or pressure    Negative: Patient sounds very sick or weak to the triager    Negative: MILD difficulty breathing (e.g., minimal/no SOB at rest, SOB with walking, pulse <100)    Negative: Fever > 103 F (39.4 C)    Negative: [1] Fever > 101 F (38.3 C) AND [2] age > 60 years    Negative: [1] Fever > 100.0 F (37.8 C) AND [2] bedridden (e.g., nursing home patient, CVA, chronic illness, recovering from surgery)    Negative: HIGH RISK for severe COVID complications (e.g., weak immune system, age > 64 years, obesity with BMI > 25, pregnant, chronic lung disease or other chronic medical condition)  (Exception: Already seen by PCP and no new or worsening symptoms.)    Negative: [1] HIGH RISK patient AND [2] influenza is widespread in the community AND [3] ONE OR MORE respiratory symptoms: cough, sore throat, runny or stuffy nose    Negative: [1] HIGH RISK patient AND [2] influenza exposure within the last 7 days AND [3] ONE OR MORE respiratory symptoms:  cough, sore throat, runny or stuffy nose    Negative: Oxygen level (e.g., pulse oximetry) 91 to 94 percent    Negative: Fever present > 3 days (72 hours)    Negative: [1] Fever returns after gone for over 24 hours AND [2] symptoms worse or not improved    Negative: [1] Continuous (nonstop) coughing interferes with work or school AND [2] no improvement using cough treatment per Care Advice    Negative: [1] COVID-19 infection suspected by caller or triager AND [2] mild symptoms (cough, fever, or others) AND [3] negative COVID-19 rapid test    Negative: Cough present > 3 weeks    Negative: [1] COVID-19 diagnosed by positive lab test (e.g., PCR, rapid self-test kit) AND [2] NO symptoms (e.g., cough, fever, others)    Protocols used: CORONAVIRUS (COVID-19) DIAGNOSED OR [id number] 1.18.2022

## 2022-10-15 NOTE — TELEPHONE ENCOUNTER
Pt reports positive home covid test today. Denies SOB or chest pain. T 98.0 TA. O2 Sat 97%. Only symptom is an occasional cough. He thinks the med he takes for AF may interact w/ Paxlovid. He is open to other tx if available. He wants to discuss w/ a provider.   Gather patient reported symptoms   Assessment   Current Symptoms at time of phone call, reported by patient: (if no symptoms, document No symptoms] Occasional cough   Date of Symptom(s) onset (if applicable) 10-     You may be eligible to receive a treatment for preventing hospitalization in patients at high risk for complications from COVID-19.  This requires a phone visit with a provider in the next 5 days.  Our scheduling dept can schedule this phone visit for you if you are interested.  Is the patient symptomatic and onset of symptoms within the last 7 days?  Yes  Is the patient interested in a visit with a provider to discuss treatment options?: Yes  Is the patient seen at [facility]?  No: Warm transfer caller to [phone] to be scheduled with a virtual urgent provider.  During transfer, instruct  on appropriate time frame for visit     Review information with Patient    Your result was positive. This means you have COVID-19 (coronavirus).      How can I protect others?    These guidelines are for isolating before returning to work, school or .       If you DO have symptoms:  o Stay home and away from others  - For at least 5 days after your symptoms started, AND   - You are fever free for 24 hours (with no medicine that reduces fever), AND  - Your other symptoms are better.  o Wear a mask for 10 full days any time you are around others.  There may be different guidelines for healthcare facilities. Please check with the specific sites before arriving.     If you've been told by a doctor that you were severely ill with COVID-19 or are immunocompromised, you should isolate for at least 10 days.      Would you like me  to review some of that information with you now?  Yes    How can I take care of myself?      Get lots of rest. Drink extra fluids (unless a doctor has told you not to).      Take Tylenol (acetaminophen) for fever or pain. If you have liver or kidney problems, ask your family doctor if it's okay to take Tylenol.     Take either:     650 mg (two 325 mg pills) every 4 to 6 hours, or     1,000 mg (two 500 mg pills) every 8 hours as needed.     Note: Do not take more than 3,000 mg in one day. Acetaminophen is found in many medicines (both prescribed and over-the-counter medicines). Read all labels to be sure you don't take too much.    For children, check the Tylenol bottle for the right dose (based on their age or weight).      If you have other health problems (like cancer, heart failure, an organ transplant or severe kidney disease): Call your specialty clinic if you don't feel better in the next 2 days.      Know when to call 911: Emergency warning signs include:    Trouble breathing or shortness of breath    Pain or pressure in the chest that doesn't go away    Feeling confused like you haven't felt before, or not being able to wake up                  Bluish-colored lips or face        Karla Randolph RN  Reason for Disposition    HIGH RISK for severe COVID complications (e.g., weak immune system, age > 64 years, obesity with BMI > 25, pregnant, chronic lung disease or other chronic medical condition)  (Exception: Already seen by PCP and no new or worsening symptoms.)    Additional Information    Negative: SEVERE difficulty breathing (e.g., struggling for each breath, speaks in single words)    Negative: Difficult to awaken or acting confused (e.g., disoriented, slurred speech)    Negative: Bluish (or gray) lips or face now    Negative: Shock suspected (e.g., cold/pale/clammy skin, too weak to stand, low BP, rapid pulse)    Negative: Sounds like a life-threatening emergency to the triager    Negative: [1] Diagnosed  or suspected COVID-19 AND [2] symptoms lasting 3 or more weeks    Negative: [1] COVID-19 exposure AND [2] no symptoms    Negative: COVID-19 vaccine reaction suspected (e.g., fever, headache, muscle aches) occurring 1 to 3 days after getting vaccine    Negative: COVID-19 vaccine, questions about    Negative: [1] Lives with someone known to have influenza (flu test positive) AND [2] flu-like symptoms (e.g., cough, runny nose, sore throat, SOB; with or without fever)    Negative: [1] Adult with possible COVID-19 symptoms AND [2] triager concerned about severity of symptoms or other causes    Negative: COVID-19 and breastfeeding, questions about    Negative: SEVERE or constant chest pain or pressure  (Exception: Mild central chest pain, present only when coughing.)    Negative: MODERATE difficulty breathing (e.g., speaks in phrases, SOB even at rest, pulse 100-120)    Negative: [1] Headache AND [2] stiff neck (can't touch chin to chest)    Negative: Chest pain or pressure    Negative: Patient sounds very sick or weak to the triager    Negative: MILD difficulty breathing (e.g., minimal/no SOB at rest, SOB with walking, pulse <100)    Negative: Oxygen level (e.g., pulse oximetry) 90 percent or lower     O2 Sat 97%    Negative: Fever > 103 F (39.4 C)    Negative: [1] Fever > 101 F (38.3 C) AND [2] age > 60 years     Temp 98.0 TA    Negative: [1] Fever > 100.0 F (37.8 C) AND [2] bedridden (e.g., nursing home patient, CVA, chronic illness, recovering from surgery)    Protocols used: CORONAVIRUS (COVID-19) DIAGNOSED OR [id number] 1.18.2022

## 2022-10-17 ENCOUNTER — VIRTUAL VISIT (OUTPATIENT)
Dept: FAMILY MEDICINE | Facility: CLINIC | Age: 77
End: 2022-10-17
Payer: MEDICARE

## 2022-10-17 DIAGNOSIS — E78.5 HYPERLIPIDEMIA, UNSPECIFIED HYPERLIPIDEMIA TYPE: ICD-10-CM

## 2022-10-17 DIAGNOSIS — U07.1 INFECTION DUE TO 2019 NOVEL CORONAVIRUS: Primary | ICD-10-CM

## 2022-10-17 DIAGNOSIS — I48.0 PAROXYSMAL ATRIAL FIBRILLATION (H): ICD-10-CM

## 2022-10-17 DIAGNOSIS — E03.9 HYPOTHYROIDISM, UNSPECIFIED TYPE: ICD-10-CM

## 2022-10-17 PROCEDURE — 99442 PR PHYSICIAN TELEPHONE EVALUATION 11-20 MIN: CPT | Mod: CS | Performed by: FAMILY MEDICINE

## 2022-10-17 NOTE — PROGRESS NOTES
Zachariah is a 77 year old who is being evaluated via a billable telephone visit.      What phone number would you like to be contacted at? [phone]  How would you like to obtain your AVS? MyChart    Assessment & Plan     Infection due to 2019 novel coronavirus  We discussed treatment options and he is not a candidate for Paxlovid since he is on flecainide.  We decided to start molnupiravir.  He may continue with symptomatic cares and get plenty of rest and stay well-hydrated.  If he develops any signs or symptoms of respiratory distress he will seek medical attention right away.  - molnupiravir (LAGEVRIO) 200 MG capsule; Take 4 capsules (800 mg) by mouth every 12 hours for 5 days    Hyperlipidemia, unspecified hyperlipidemia type  Stable on rosuvastatin.    Hypothyroidism, unspecified type  Stable on levothyroxine.    Paroxysmal atrial fibrillation (H)  Stable on flecainide and Eliquis.                   No follow-ups on file.    Nhan Ayala, [facility]   Zachariah is a 77 year old, presenting for the following health issues:  Suspected Covid      HPI       COVID-19 Symptom Review  How many days ago did these symptoms start? Saturday and tested positive Saturday, exposed last Tuesday     Are any of the following symptoms significant for you?    New or worsening difficulty breathing? No    Worsening cough? Yes, it's a dry cough.     Fever or chills? No    Headache: No    Sore throat: No    Chest pain: No    Diarrhea: No    Body aches? YES    What treatments has patient tried? flecainade , azithromycin and tylenol  Does patient live in a nursing home, group home, or shelter? No  Does patient have a way to get food/medications during quarantined? Yes, I have a friend or family member who can help me.              Review of Systems   Constitutional, HEENT, cardiovascular, pulmonary, GI, , musculoskeletal, neuro, skin, endocrine and psych systems are negative, except as  "otherwise noted.      Objective    Vitals - Patient Reported  Weight (Patient Reported): 83.9 kg (185 lb)  Height (Patient Reported): 175.3 cm (5' 9\")  BMI (Based on Pt Reported Ht/Wt): 27.32  SpO2 (Patient Reported): 97  Temperature (Patient Reported): 98.1  F (36.7  C)        Physical Exam   healthy, alert and no distress  PSYCH: Alert and oriented times 3; coherent speech, normal   rate and volume, able to articulate logical thoughts, able   to abstract reason, no tangential thoughts, no hallucinations   or delusions  His affect is normal  RESP: No cough, no audible wheezing, able to talk in full sentences  Remainder of exam unable to be completed due to telephone visits                Phone call duration: 13 minutes    "

## 2022-10-23 ENCOUNTER — HEALTH MAINTENANCE LETTER (OUTPATIENT)
Age: 77
End: 2022-10-23

## 2022-10-25 ENCOUNTER — MYC MEDICAL ADVICE (OUTPATIENT)
Dept: FAMILY MEDICINE | Facility: CLINIC | Age: 77
End: 2022-10-25

## 2022-10-25 DIAGNOSIS — M54.9 ACUTE BACK PAIN, UNSPECIFIED BACK LOCATION, UNSPECIFIED BACK PAIN LATERALITY: Primary | ICD-10-CM

## 2022-10-28 NOTE — TELEPHONE ENCOUNTER
PCP- please see mychart:    Please respond back to patient or send to triage to follow up. If patient needs to be scheduled, please route to team coordinators.    Sudheer Millan RN  [facility]

## 2022-11-04 ENCOUNTER — THERAPY VISIT (OUTPATIENT)
Dept: PHYSICAL THERAPY | Facility: CLINIC | Age: 77
End: 2022-11-04
Attending: INTERNAL MEDICINE
Payer: MEDICARE

## 2022-11-04 DIAGNOSIS — M54.9 ACUTE BACK PAIN, UNSPECIFIED BACK LOCATION, UNSPECIFIED BACK PAIN LATERALITY: ICD-10-CM

## 2022-11-04 DIAGNOSIS — M54.41 ACUTE RIGHT-SIDED LOW BACK PAIN WITH RIGHT-SIDED SCIATICA: ICD-10-CM

## 2022-11-04 PROCEDURE — 97530 THERAPEUTIC ACTIVITIES: CPT | Mod: GP | Performed by: PHYSICAL THERAPIST

## 2022-11-04 PROCEDURE — 97110 THERAPEUTIC EXERCISES: CPT | Mod: GP | Performed by: PHYSICAL THERAPIST

## 2022-11-04 PROCEDURE — 97161 PT EVAL LOW COMPLEX 20 MIN: CPT | Mod: GP | Performed by: PHYSICAL THERAPIST

## 2022-11-04 NOTE — PROGRESS NOTES
[facility]    OUTPATIENT Physical Therapy ORTHOPEDIC EVALUATION  PLAN OF TREATMENT FOR OUTPATIENT REHABILITATION  (COMPLETE FOR INITIAL CLAIMS ONLY)  Patient's Last Name, First Name, M.I.  YOB: 1945  Zachariah Ram    Provider s Name:  [facility]   Medical Record No.  [id number]   Start of Care Date:  11/04/22   Onset Date:   10/23/22   Treatment Diagnosis:  R lumbar radiculopathy Medical Diagnosis:     Acute back pain, unspecified back location, unspecified back pain laterality  Acute right-sided low back pain with right-sided sciatica       Goals:     11/04/22 0500   Body Part   Goals listed below are for low back   Goal #1   Goal #1 lifting/carrying   Previous Functional Level No restrictions   Current Functional Level Can lift;an item to counter height weighing   Performance level 20# with pain 4/10   STG Target Performance Lift an item to counter height weighing   Performance level 20# with pain 2/10   Rationale for housework such as laundry, emptying garbage, use of    Due date 12/02/22   LTG Target Performance Lift an item to counter height weighing   Performance Level 20# with pain 0--1/10   Rationale for housework such as laundry, emptying garbage, use of    Due date 12/30/22       Therapy Frequency:  2 x month  Predicted Duration of Therapy Intervention:  1 month    Mitzi Barone, PT                 I CERTIFY THE NEED FOR THESE SERVICES FURNISHED UNDER        THIS PLAN OF TREATMENT AND WHILE UNDER MY CARE     (Physician attestation of this document indicates review and certification of the therapy plan).                     Certification Date From:  11/04/22   Certification Date To:  02/01/23    Referring Provider:  Edu Reyes    Initial Assessment        See Epic Evaluation SOC Date: 11/04/22

## 2022-11-04 NOTE — PROGRESS NOTES
Obtain Primary Care Provider     Physical Therapy Initial Evaluation    Physical Therapy Initial Examination/Evaluation  November 4, 2022    Zachariah Ram  is a 77 year old  male referred to physical therapy by Dr. Edu Reyes for treatment of R lumbar radiculopathy.      DOI/onset 10-23-22  Mechanism of injury Patient had an episode of LBP upon bending down to pick a light object up from the floor. The pain was resolving when he had a 2nd episode 10-31 that now involves R LE radicular pain.  DOS n/a  Prior treatment none currently. Has had PT for LBP in the past Effect of prior treatment very helpful. Patient continues to do the home exercises daily.    Chief Complaint:   Pain down the R leg.   Pain location: R LS area/buttock, pain in the lateral lower leg.   Quality: aching  Constant/Intermittent: intermittent  Time of day: worse first thing in the morning  Symptoms have improved some since onset.    Current pain 2/10.  Pain at best 1/10.  Pain at worst 4/10.    Symptoms aggravated by first getting up form prolonged sitting, prolonged walking, bending/lifting.    Symptoms improved with exercises, walking.     Social history:  . Lives in an apartment with wife. [place] in [place] January through April    Occupation: retired.     Patient having difficulty with ADLs: none.    Patient's goals are to reduce pain.    Patient reports general health as good.  Related medical history has a hx of LBP episodes x 10 years with 1 episode every 1-2 years. They normally resolve on their own after a few weeks..    Surgical History:  None related to low back.    Imaging: none.    Medications:  none.       Return to MD:  As needed.      Clinical Impression: Patient should respond well to continued PT intervention working to decrease pain and improve strength and mobility through therapeutic exercise.    Subjective:    Patient Health History  Zachariah Ram being seen for Back pain similar to November, 2021.     Problem began: 10/23/2022.   Problem  occurred: Bent over to  an item on the floor.   Pain is reported as 3/10 on pain scale.  General health as reported by patient is good.  Pertinent medical history includes: cancer, heart problems, high blood pressure, thyroid problems and other. Other medical history details: Sleep apnea, glaucoma, pain in back, knee, shoulder, and down the right leg..        Surgeries include:  Orthopedic surgery, heart surgery, cancer surgery and other. Other surgery history details: partial parotidechtomy, two hernia repairs, meniscus knee repair, partial throidechtomy, Lasik eye surgery..    Current medications:  Cardiac medication, high blood pressure medication, sleep medication, thyroid medication and other. Other medications details: Eliquis, Crestor, and supplements listed on Meds list..    Current occupation is Retired.   Primary job tasks include:  Other.   Other job/home tasks details: Do stretching 1/2 hr/day, walk or treadmill 4/week, golf 1/week summer, hike 2/week winter.                                  Objective:  Standing Alignment:        Lumbar:  Lordosis decr (stands with slight fwd trunk flexion )            Gait:    Gait Type:  Normal         Flexibility/Screens:   Positive screens:  Lumbar    Lower Extremity:  Decreased left lower extremity flexibility:Hip Flexors and Hamstrings    Decreased right lower extremity flexibility:  Hip Flexors and Hamstrings               Lumbar/SI Evaluation  ROM:    AROM Lumbar:   Flexion:            75%  Ext:                    75%   Side Bend:        Left:  Normal    Right:  Normal  Rotation:           Left:  90%    Right:  Normal  Side Glide:        Left:     Right:         Strength: lower abdominals 4/5  Lumbar Myotomes:  normal                Lumbar Dermtomes:  normal                Neural Tension/Mobility:        Right side:   Slump; Fausto-Lumbar or SLR  negative.   Lumbar Palpation:      Tenderness present at Right: Quadratus Lumborum and Erector Spinae      Spinal  Segmental Conclusions: Slight pain with P/A glide L5    Level: Hypo noted at L4, L5 and S1                                                   General     ROS    Assessment/Plan:    Patient is a 77 year old male with lumbar complaints.    Patient has the following significant findings with corresponding treatment plan.                Diagnosis 1:  R lumbar radiculopathy  Pain -  self management and education  Decreased ROM/flexibility - manual therapy and therapeutic exercise  Decreased strength - therapeutic exercise and therapeutic activities  Impaired muscle performance - neuro re-education  Decreased function - therapeutic activities    Therapy Evaluation Codes:   1) History comprised of:   Personal factors that impact the plan of care:      None.    Comorbidity factors that impact the plan of care are:      None.     Medications impacting care: None.  2) Examination of Body Systems comprised of:   Body structures and functions that impact the plan of care:      Lumbar spine.   Activity limitations that impact the plan of care are:      Bending and Lifting.  3) Clinical presentation characteristics are:   Stable/Uncomplicated.  4) Decision-Making    Low complexity using standardized patient assessment instrument and/or measureable assessment of functional outcome.  Cumulative Therapy Evaluation is: Low complexity.    Previous and current functional limitations:  (See Goal Flow Sheet for this information)    Short term and Long term goals: (See Goal Flow Sheet for this information)     Communication ability:  Patient appears to be able to clearly communicate and understand verbal and written communication and follow directions correctly.  Treatment Explanation - The following has been discussed with the patient:   RX ordered/plan of care  Anticipated outcomes  Possible risks and side effects  This patient would benefit from PT intervention to resume normal activities.   Rehab potential is good.    Frequency:  2 X a  month, once daily  Duration:  for 1 month  Discharge Plan:  Achieve all LTG.  Independent in home treatment program.  Reach maximal therapeutic benefit.    Please refer to the daily flowsheet for treatment today, total treatment time and time spent performing 1:1 timed codes.

## 2022-11-08 ENCOUNTER — MYC MEDICAL ADVICE (OUTPATIENT)
Dept: ORTHOPEDICS | Facility: CLINIC | Age: 77
End: 2022-11-08

## 2022-11-15 ENCOUNTER — THERAPY VISIT (OUTPATIENT)
Dept: PHYSICAL THERAPY | Facility: CLINIC | Age: 77
End: 2022-11-15
Payer: MEDICARE

## 2022-11-15 DIAGNOSIS — M54.41 ACUTE RIGHT-SIDED LOW BACK PAIN WITH RIGHT-SIDED SCIATICA: Primary | ICD-10-CM

## 2022-11-15 PROCEDURE — 97110 THERAPEUTIC EXERCISES: CPT | Mod: GP | Performed by: PHYSICAL THERAPIST

## 2022-11-15 PROCEDURE — 97530 THERAPEUTIC ACTIVITIES: CPT | Mod: GP | Performed by: PHYSICAL THERAPIST

## 2022-11-15 NOTE — PROGRESS NOTES
"Subjective:  HPI  Physical Exam                    Objective:  System    Physical Exam    General     ROS    Assessment/Plan:    SUBJECTIVE  Subjective changes as noted by pt:   Pt. reports feeling 75% recovered at this point. He still has some L LE pains upon first getting up in the morning but then after doing his ex's and \"loosening up\" he has little to no pain throughout the day. he is still careful how he moves. Pt. has an appt at the spine clinic 11/21 and is going to ask for an injection.   Current pain level:  2/10   Changes in function:  Yes (See Goal flowsheet attached for changes in current functional level)     Adverse reaction to treatment or activity:  None    OBJECTIVE  Changes in objective findings:  ROM lumbar flex 90%; ext 80%     ASSESSMENT  Zachariah continues to require intervention to meet STG and LTG's: PT  Patient's symptoms are resolving.  Response to therapy has shown an improvement in  pain level, radicular symptoms and ROM   Progress made towards STG/LTG?  Yes (See Goal flowsheet attached for updates on achievement of STG and LTG)    PLAN  Current treatment program is being advanced to more complex exercises.    PTA/ATC plan:  N/A    Please refer to the daily flowsheet for treatment today, total treatment time and time spent performing 1:1 timed codes.        "

## 2022-11-18 ENCOUNTER — TRANSFERRED RECORDS (OUTPATIENT)
Dept: HEALTH INFORMATION MANAGEMENT | Facility: CLINIC | Age: 77
End: 2022-11-18

## 2022-11-21 ENCOUNTER — OFFICE VISIT (OUTPATIENT)
Dept: NEUROSURGERY | Facility: CLINIC | Age: 77
End: 2022-11-21
Payer: MEDICARE

## 2022-11-21 VITALS — HEART RATE: 62 BPM | DIASTOLIC BLOOD PRESSURE: 75 MMHG | SYSTOLIC BLOOD PRESSURE: 133 MMHG | OXYGEN SATURATION: 97 %

## 2022-11-21 DIAGNOSIS — M79.661 PAIN OF RIGHT LOWER LEG: Primary | ICD-10-CM

## 2022-11-21 PROCEDURE — 99213 OFFICE O/P EST LOW 20 MIN: CPT | Performed by: PHYSICIAN ASSISTANT

## 2022-11-21 ASSESSMENT — PAIN SCALES - GENERAL: PAINLEVEL: MILD PAIN (2)

## 2022-11-21 NOTE — NURSING NOTE
"Zachariah Ram is a 77 year old male who presents for:  Chief Complaint   Patient presents with     Follow Up     Lower back pain radiates down right leg. Had some PT recently. No current imaging or injections.        Initial Vitals:  /75   Pulse 62   SpO2 97%  Estimated body mass index is 27.25 kg/m  as calculated from the following:    Height as of 8/30/22: 5' 9\" (1.753 m).    Weight as of 8/30/22: 184 lb 8 oz (83.7 kg).. There is no height or weight on file to calculate BSA. BP completed using cuff size: regular  Mild Pain (2)    Nursing Comments:     Marlene Leal MA  "

## 2022-11-21 NOTE — PROGRESS NOTES
Neurosurgery follow-up    Mr. Ram is a 77-year-old male who returns to clinic for follow-up.  He states about a month ago he had a flareup of pain in his right leg, it has since resolved.  He denies any other symptoms at this time.  He wanted to discuss any options he has for preventative measures.  He did not end up getting an injection after his last flareup over a year ago because his symptoms resolved before the scheduled injection.  He does physical therapy consistently.    Exam    B/P: 133/75, T: Data Unavailable, P: 62, R: Data Unavailable     Alert and oriented no acute distress  Gait is normal  Bilateral lower extremities with appropriate strength    Imaging    Lumbar MRI demonstrates lateral recess stenosis at L4-5.    Assessment    Right leg pain that happens intermittently use about once a year and resolve spontaneously.      Plan    Patient can follow-up as needed continue with conservative therapies and activity as tolerated if his symptoms become refractory to physical therapy and spontaneous resolution he should contact our clinic he might want to consider a Medrol Dosepak and if he wants to have a faster recovery.  He can certainly reach out to his primary care provider at that time for Medrol Dosepak to see if they recommend him taking it given his anticoagulation.

## 2022-11-21 NOTE — LETTER
11/21/2022         RE: Zachariah Ram  [address]  [address]  [place] [address]        Dear Colleague,    Thank you for referring your patient, Zachariah Ram, to the [facility]. Please see a copy of my visit note below.    Neurosurgery follow-up    Mr. Ram is a 77-year-old male who returns to clinic for follow-up.  He states about a month ago he had a flareup of pain in his right leg, it has since resolved.  He denies any other symptoms at this time.  He wanted to discuss any options he has for preventative measures.  He did not end up getting an injection after his last flareup over a year ago because his symptoms resolved before the scheduled injection.  He does physical therapy consistently.    Exam    B/P: 133/75, T: Data Unavailable, P: 62, R: Data Unavailable     Alert and oriented no acute distress  Gait is normal  Bilateral lower extremities with appropriate strength    Imaging    Lumbar MRI demonstrates lateral recess stenosis at L4-5.    Assessment    Right leg pain that happens intermittently use about once a year and resolve spontaneously.      Plan    Patient can follow-up as needed continue with conservative therapies and activity as tolerated if his symptoms become refractory to physical therapy and spontaneous resolution he should contact our clinic he might want to consider a Medrol Dosepak and if he wants to have a faster recovery.  He can certainly reach out to his primary care provider at that time for Medrol Dosepak to see if they recommend him taking it given his anticoagulation.              Again, thank you for allowing me to participate in the care of your patient.        Sincerely,        Mike Andersen PA-C

## 2022-11-30 DIAGNOSIS — K21.9 GASTROESOPHAGEAL REFLUX DISEASE, UNSPECIFIED WHETHER ESOPHAGITIS PRESENT: ICD-10-CM

## 2022-12-01 NOTE — TELEPHONE ENCOUNTER
Prescription approved per [facility] Refill Protocol.  Leslye HERRMANN, Triage RN  [facility]

## 2022-12-02 ENCOUNTER — MYC MEDICAL ADVICE (OUTPATIENT)
Dept: FAMILY MEDICINE | Facility: CLINIC | Age: 77
End: 2022-12-02

## 2022-12-02 DIAGNOSIS — E03.9 HYPOTHYROIDISM, UNSPECIFIED TYPE: ICD-10-CM

## 2022-12-05 RX ORDER — LEVOTHYROXINE SODIUM 100 UG/1
100 TABLET ORAL DAILY
Qty: 90 TABLET | Refills: 1 | Status: SHIPPED | OUTPATIENT
Start: 2022-12-05 | End: 2023-04-12

## 2022-12-05 NOTE — TELEPHONE ENCOUNTER
Pended levothyroxine 100mcg and routing to refill pool.    Zuri Artis RN  -[facility]

## 2022-12-06 DIAGNOSIS — N52.9 ERECTILE DYSFUNCTION, UNSPECIFIED ERECTILE DYSFUNCTION TYPE: ICD-10-CM

## 2022-12-07 RX ORDER — TADALAFIL 5 MG/1
5 TABLET ORAL DAILY PRN
Qty: 30 TABLET | OUTPATIENT
Start: 2022-12-07

## 2022-12-07 NOTE — TELEPHONE ENCOUNTER
tadalafil (CIALIS) 5 MG tablet      Last Written Prescription Date:  10-7-21  Last Fill Quantity: 30,   # refills: 11  Last Office Visit : 2-10-21  Future Office visit:  none    Routing refill request to provider for review/approval because:  Last appt >18 m    Scheduling has been notified to contact the pt for appointment.

## 2022-12-11 ENCOUNTER — MYC MEDICAL ADVICE (OUTPATIENT)
Dept: UROLOGY | Facility: CLINIC | Age: 77
End: 2022-12-11

## 2022-12-11 DIAGNOSIS — N52.9 ERECTILE DYSFUNCTION, UNSPECIFIED ERECTILE DYSFUNCTION TYPE: ICD-10-CM

## 2022-12-12 ENCOUNTER — TRANSFERRED RECORDS (OUTPATIENT)
Dept: HEALTH INFORMATION MANAGEMENT | Facility: CLINIC | Age: 77
End: 2022-12-12

## 2022-12-12 RX ORDER — TADALAFIL 5 MG/1
5 TABLET ORAL DAILY PRN
Qty: 30 TABLET | Refills: 0 | Status: SHIPPED | OUTPATIENT
Start: 2022-12-12 | End: 2023-05-12

## 2022-12-12 NOTE — TELEPHONE ENCOUNTER
tadalafil (CIALIS) 5 MG tablet  Last Written Prescription Date:   10/7/2021  Last Fill Quantity: 30,   # refills: 11  Last Office Visit :  4/14/2021  Future Office visit:  None    Routing refill request to provider for review/approval because:  Second request  Needing update office visit  Please call Pt to schedule      Elisa Woo RN  Central Triage Red Flags/Med Refills

## 2022-12-12 NOTE — PROGRESS NOTES
Signed Prescriptions:                        Disp   Refills    tadalafil (CIALIS) 5 MG tablet             30 tab*0        Sig: Take 1 tablet (5 mg) by mouth daily as needed           (Erectile dysfunction) Schedule appointment for           additional refills.  Authorizing Provider: BÁRBARA THORPE  Ordering User: DARRELL MANNING

## 2023-01-05 ENCOUNTER — TRANSFERRED RECORDS (OUTPATIENT)
Dept: HEALTH INFORMATION MANAGEMENT | Facility: CLINIC | Age: 78
End: 2023-01-05

## 2023-03-19 ENCOUNTER — TRANSFERRED RECORDS (OUTPATIENT)
Dept: HEALTH INFORMATION MANAGEMENT | Facility: CLINIC | Age: 78
End: 2023-03-19
Payer: MEDICARE

## 2023-03-19 LAB
ALT SERPL-CCNC: 28 U/L (ref 13–61)
AST SERPL-CCNC: 20 U/L (ref 15–37)
INR (EXTERNAL): 1

## 2023-03-20 ENCOUNTER — TRANSFERRED RECORDS (OUTPATIENT)
Dept: HEALTH INFORMATION MANAGEMENT | Facility: CLINIC | Age: 78
End: 2023-03-20
Payer: MEDICARE

## 2023-03-20 LAB
CHOLESTEROL (EXTERNAL): 97 MG/DL (ref 50–200)
CREATININE (EXTERNAL): 1.05 MG/DL (ref 0.6–1.3)
GFR ESTIMATED (EXTERNAL): 68.1 ML/MIN/1.73M2
GFR ESTIMATED (IF AFRICAN AMERICAN) (EXTERNAL): 79 ML/MIN/1.73M2
GLUCOSE (EXTERNAL): 88 MG/DL (ref 70–106)
HDLC SERPL-MCNC: 53 MG/DL (ref 40–80)
LDL CHOLESTEROL CALCULATED (EXTERNAL): 22 MG/DL
POTASSIUM (EXTERNAL): 3.7 MEQ/L (ref 3.7–5.9)
TRIGLYCERIDES (EXTERNAL): 111 MG/DL (ref 2–150)
TSH SERPL-ACNC: 3.93 MIU/ML (ref 0.36–3.74)

## 2023-03-21 ENCOUNTER — PRE VISIT (OUTPATIENT)
Dept: UROLOGY | Facility: CLINIC | Age: 78
End: 2023-03-21
Payer: MEDICARE

## 2023-03-21 NOTE — TELEPHONE ENCOUNTER
Reason for visit: med refill      Dx/Hx/Sx: ED    Records/imaging/labs/orders: in epic    At Rooming: video visit   
Andi Singh (Attending)

## 2023-03-28 ENCOUNTER — MYC MEDICAL ADVICE (OUTPATIENT)
Dept: FAMILY MEDICINE | Facility: CLINIC | Age: 78
End: 2023-03-28
Payer: MEDICARE

## 2023-03-28 DIAGNOSIS — Z86.73 HISTORY OF TIA (TRANSIENT ISCHEMIC ATTACK) AND STROKE: Primary | ICD-10-CM

## 2023-03-29 NOTE — TELEPHONE ENCOUNTER
Please advise patient to schedule a follow-up once he is in [place].   I can also place a referral to neurology as well.  I would not be able to do a virtual visit if he is out of state as this is the policy of the clinic.  Dr Reyes

## 2023-03-29 NOTE — TELEPHONE ENCOUNTER
PCP- please see mychart:    Patient is out of state until 4/17/23 - was seen locally for symptoms     Please respond back to patient or send to triage to follow up. If patient needs to be scheduled, please route to team coordinators.    Sudheer Millan RN  [facility]

## 2023-03-31 NOTE — TELEPHONE ENCOUNTER
See below message.     Referral for Neurology pended for your REveiw.     Sabine Felix RN on 3/31/2023 at 8:28 AM

## 2023-04-11 DIAGNOSIS — E03.9 HYPOTHYROIDISM, UNSPECIFIED TYPE: ICD-10-CM

## 2023-04-11 NOTE — TELEPHONE ENCOUNTER
Action 4/11/23 MV 2.59pm   Action Taken Records and imaging request faxed to [place], [place]    Fax # [phone]    Tracking # [id number]     Action 4/18/23 MV 8.26am   Action Taken 2nd request faxed     Action 4/21/23 MV 12.45pm   Action Taken Imaging disk received and sent to  for processing ; records rec'd and scanned to chart     Action 4/24/23 MV 9.43am   Action Taken Received IB from [facility] that imaging disk does not contain imaging. Called pt and asked if he has disk on him. Pt stated no.     [facility] HIM not opened yet due to different time zones. Will call again later. In the mean time, 2nd request faxed for new imaging disk.  Tracking # [id number]     Action 4/27/23 MV 7.18am   Action Taken 2nd request faxed     Action 5/3/23 MV 7.34am   Action Taken 3rd request faxed.  [phone]             RECORDS RECEIVED FROM: internal   REASON FOR VISIT: history of TIA   Date of Appt: 4/25/23   NOTES (FOR ALL VISITS) STATUS DETAILS   OFFICE NOTE from referring provider Internal Dr Edu Reyes @ [facility] Internal Med:  3/28/23 mychart encounter   DISCHARGE REPORT from the ER Received [place]  3/19/23-3/20/23   MEDICATION LIST Internal    IMAGING  (FOR ALL VISITS)     MRI (HEAD, NECK, SPINE) In process [place]:  MRI Brain 3/20/23   CT (HEAD, NECK, SPINE) In process [place]:  CT Head 3/19/23  CTA Head Neck 3/19/23

## 2023-04-12 RX ORDER — LEVOTHYROXINE SODIUM 100 UG/1
TABLET ORAL
Qty: 90 TABLET | Refills: 0 | Status: SHIPPED | OUTPATIENT
Start: 2023-04-12 | End: 2023-05-24

## 2023-04-21 DIAGNOSIS — L30.9 DERMATITIS: ICD-10-CM

## 2023-04-21 DIAGNOSIS — Z29.89 NEED FOR SBE (SUBACUTE BACTERIAL ENDOCARDITIS) PROPHYLAXIS: ICD-10-CM

## 2023-04-21 RX ORDER — KETOCONAZOLE 20 MG/ML
SHAMPOO TOPICAL
Qty: 360 ML | Refills: 0 | Status: SHIPPED | OUTPATIENT
Start: 2023-04-21 | End: 2024-09-03

## 2023-04-21 RX ORDER — AMOXICILLIN 500 MG/1
CAPSULE ORAL
Qty: 12 CAPSULE | Refills: 1 | Status: SHIPPED | OUTPATIENT
Start: 2023-04-21 | End: 2024-06-22

## 2023-04-25 ENCOUNTER — PRE VISIT (OUTPATIENT)
Dept: NEUROLOGY | Facility: CLINIC | Age: 78
End: 2023-04-25

## 2023-04-25 ENCOUNTER — VIRTUAL VISIT (OUTPATIENT)
Dept: NEUROLOGY | Facility: CLINIC | Age: 78
End: 2023-04-25
Attending: INTERNAL MEDICINE
Payer: MEDICARE

## 2023-04-25 DIAGNOSIS — G45.9 TIA (TRANSIENT ISCHEMIC ATTACK): Primary | ICD-10-CM

## 2023-04-25 DIAGNOSIS — Z87.898 HISTORY OF UNSTEADY GAIT: ICD-10-CM

## 2023-04-25 PROCEDURE — 99205 OFFICE O/P NEW HI 60 MIN: CPT | Mod: VID | Performed by: PSYCHIATRY & NEUROLOGY

## 2023-04-25 NOTE — PATIENT INSTRUCTIONS
No follow up appointment with me for now  Michaelopafidelia ordered - results will be sent via [employer]    Stroke [facility] Endovascular RN Care Coordinators:    Millie Horan, RN, BSN  Marley Feldman, RN, CNRN, SCRN    If you have any questions please contact the RN Care Coordinators at [phone], option 1.     Thank you for choosing [facility] for your health care needs.

## 2023-04-25 NOTE — LETTER
4/25/2023       RE: Zachariah Ram  [address]  [address]  [place] [address]       Dear Colleague,    Thank you for referring your patient, Zachariah Ram, to the [facility]UR[facility] [place] at [facility]. Please see a copy of my visit note below.    Zachariah is a 78 year old who is being evaluated via a billable video visit.      How would you like to obtain your AVS? Mychart  If the video visit is dropped, the invitation should be resent by: [phone]              [facility] [place]  [address]  [address]RD [address]LO[address]  [place] [address] [phone]          April 25, 2023    Zachariah Ram                                                                                                                     [address]  [address]  [place] [address]      TOTAL 83 minutes  Video visit: 9:00 to 9:59 AM  Documentation: 24 minutes    Mr. Ram is a 78 year old gentleman with a h/o of MV repair, AFIB and on Apixaban who had a spell while in [place] and was evaluated and observed at [facility] ([facility]  is also noted in some reports). The spell described by the patient who is a good historian is that he had a 3-4 minute period when he could not walk straight and was veering to the R. He was outside doing yard work and was not particularly dehydrated. The spell also included visual blurring and a sense of water over glasses. The spell resolved on its own. No N/V, no HA no other neurological symptoms. The patient had a brain MRI dated 3/20/23 - report available in our system. There was no acute infarct. 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 EKG report states junctional rhythm but patient was normal in the ER. 10 days previously, patient had fallen - hit is head but did not seek medical attention at that time. He has had repeat spells x2 when he got out of  bed and these were similar. Of note, he did not perceive the room moving or a sens of movement in himself (vertigo) but just had difficulty walking and veering to the R.     We have requested imaging from [place] - these are not in the system yet.    Exam in [place] was normal and was normal today. No h/o smoking.       ASSESSMENT / PLAN  Encounter Diagnoses   Name Primary?    TIA (transient ischemic attack) Yes    History of unsteady gait      This gentleman had a spell of gait difficulty and visual disturbance lasting 3-4 minutes. The differential includes TIA, BPPV (did not have vertigo), migraine related phenomena (late onset migraine aura without HA) or seizures (unlikely). The most likely explanation appears to be a TIA. He does not have large vessel occlusion or narrowing. One possibility is that he had a spell of AFIB which caused hypoperfusion. I have ordered a ziopatch and will send the results via [employer]. He has some chronic lacunar infarcts in the basal ganglia by report. We discussed silent infarcts. He is not on aspirin - (discharge summary from [place] recommends this). No further follow up with me. He will discuss driving with the DMV. He was also educated about going to the ER if he hits his head due to being on anticoagulation. He also discussed cutting back on solo and strenuous hikes in case he has other spells.    Orders Placed This Encounter   Procedures    Adult Leadless EKG Monitor 8 to 14 Days       EXAM    Orientation: Normal; Language normal; Attention: Serial 7: 5/5; DLROW;5/5 normal  Cranial nerves: EOMI face symmetric tongue ML shoulder shrug normal no new face numbness had R partial parotidectomy > 20 years ago and some residual numbness from that    Motor: FFM normal bilaterally; No drift; Strength antigravity or better in both UE and LE; Proximal upper extremity strength was tested by his wife and was normal and symmetric both sides.  Sensory: no deficits to LT  Co-ordination normal in both UE    Gait: walks with shuffle narrow base steady        Again, thank you for allowing me to participate in the care of your patient.      Sincerely,    Katja Godoy MD

## 2023-04-25 NOTE — PROGRESS NOTES
[facility] NEUROLOG[facility] [address]  3RD [address]LO[address]  [place] [address] [phone]          April 25, 2023    Zachariah Ram                                                                                                                     [address]  [address]  [place] [address]      TOTAL 83 minutes  Video visit: 9:00 to 9:59 AM  Documentation: 24 minutes    Mr. Ram is a 78 year old gentleman with a h/o of MV repair, AFIB and on Apixaban who had a spell while in [place] and was evaluated and observed at [facility] ([facility]  is also noted in some reports). The spell described by the patient who is a good historian is that he had a 3-4 minute period when he could not walk straight and was veering to the R. He was outside doing yard work and was not particularly dehydrated. The spell also included visual blurring and a sense of water over glasses. The spell resolved on its own. No N/V, no HA no other neurological symptoms. The patient had a brain MRI dated 3/20/23 - report available in our system. There was no acute infarct. 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 EKG report states junctional rhythm but patient was normal in the ER. 10 days previously, patient had fallen - hit is head but did not seek medical attention at that time. He has had repeat spells x2 when he got out of bed and these were similar. Of note, he did not perceive the room moving or a sens of movement in himself (vertigo) but just had difficulty walking and veering to the R.     We have requested imaging from [place] - these are not in the system yet.    Exam in [place] was normal and was normal today. No h/o smoking.       ASSESSMENT / PLAN  Encounter Diagnoses   Name Primary?     TIA (transient ischemic attack) Yes     History of unsteady gait      This gentleman had a spell of gait difficulty and visual disturbance lasting 3-4 minutes. The  differential includes TIA, BPPV (did not have vertigo), migraine related phenomena (late onset migraine aura without HA) or seizures (unlikely). The most likely explanation appears to be a TIA. He does not have large vessel occlusion or narrowing. One possibility is that he had a spell of AFIB which caused hypoperfusion. I have ordered a ziopatch and will send the results via mychart. He has some chronic lacunar infarcts in the basal ganglia by report. We discussed silent infarcts. He is not on aspirin - (discharge summary from [place] recommends this). No further follow up with me. He will discuss driving with the DMV. He was also educated about going to the ER if he hits his head due to being on anticoagulation. He also discussed cutting back on solo and strenuous hikes in case he has other spells.    Orders Placed This Encounter   Procedures     Adult Leadless EKG Monitor 8 to 14 Days       EXAM    Orientation: Normal; Language normal; Attention: Serial 7: 5/5; DLROW;5/5 normal  Cranial nerves: EOMI face symmetric tongue ML shoulder shrug normal no new face numbness had R partial parotidectomy > 20 years ago and some residual numbness from that    Motor: FFM normal bilaterally; No drift; Strength antigravity or better in both UE and LE; Proximal upper extremity strength was tested by his wife and was normal and symmetric both sides.  Sensory: no deficits to LT  Co-ordination normal in both UE   Gait: walks with shuffle narrow base steady      Katja Godoy MD

## 2023-04-25 NOTE — PROGRESS NOTES
Zachariah is a 78 year old who is being evaluated via a billable video visit.      How would you like to obtain your AVS? Mychart  If the video visit is dropped, the invitation should be resent by: [phone]      Video-Visit Details    Type of service:  Video Visit   Video Start Time: 9 AM  Video End Time:9:59 M    Originating Location (pt. Location): Home  Distant Location (provider location):  Off-site  Platform used for Video Visit:David

## 2023-05-12 ENCOUNTER — VIRTUAL VISIT (OUTPATIENT)
Dept: UROLOGY | Facility: CLINIC | Age: 78
End: 2023-05-12
Payer: MEDICARE

## 2023-05-12 DIAGNOSIS — N52.9 ERECTILE DYSFUNCTION, UNSPECIFIED ERECTILE DYSFUNCTION TYPE: ICD-10-CM

## 2023-05-12 PROCEDURE — 99213 OFFICE O/P EST LOW 20 MIN: CPT | Mod: VID | Performed by: NURSE PRACTITIONER

## 2023-05-12 RX ORDER — TADALAFIL 5 MG/1
10 TABLET ORAL DAILY PRN
Qty: 30 TABLET | Refills: 6 | Status: SHIPPED | OUTPATIENT
Start: 2023-05-12 | End: 2024-05-14

## 2023-05-12 NOTE — PATIENT INSTRUCTIONS
UROLOGY CLINIC VISIT PATIENT INSTRUCTIONS    -Refills of the Cialis sent to your pharmacy. Can take up to 4 of these total at one time. Try out the constriction ring to see if this helps to prolong the erection.   -Follow up with me in one year.     If you have any issues, questions or concerns in the meantime, do not hesitate to contact us at [phone] or via [employer].     Arlen Abel, CNP  Department of Urology

## 2023-05-12 NOTE — PROGRESS NOTES
Virtual Visit Details    Type of service:  Video Visit   Video Start Time: 3:37 PM  Video End Time: 4:01 PM    Originating Location (pt. Location): Home  Distant Location (provider location):  Off-site  Platform used for Video Visit: David Ram complains of   Chief Complaint   Patient presents with     Video Visit     Medication refills        Assessment/Plan:  78 year old male with a PMH of prostate cancer s/p RALP in 05/2020 with resulting ED. Has tried ICI, which has caused pain during injection and ejaculation. For the past year, has used Cialis. Unable to penetrate during intercourse attempts, however, as he feels his erection weakens a bit. Suspect he may have some vascular leaking and may benefit from using a penile constriction ring to help maintain the erection. He will look into this and get one to try. He otherwise is not interested in using the injections anymore due to pain so will continue with the Cialis. In need of refills.   -Cialis refills sent to pharmacy. 5 mg tabs and can take up to 4 of these as needed.   -Follow up with me in one year, or sooner if needed.     Arlen Abel, CNP  Department of Urology      Subjective:   Very pleasant 78 year old gentleman with PMH of prostate cancer s/p RALP in 05/2020 at [place]. Path showed [place] 4+3=7. All margins/nodes negative. PSA undetectable as of one year ago. He presents for virtual follow up regarding erectile dysfunction. He previously saw Dr. Sanchez in 02/2021 for ED which had not responded well to sildenafil (also had side effects), and was therefore started on Trimix. Was initially started on #8 but was increased to #4.    Today, he states that he did use the injection a few times when it was initially prescribed but had trouble with pain after the injection and with ejaculation. He has since been using Cialis. Has 5 mg tablets prescribed and has taken 2-3 of these at once. He and his wife of 54 years have then engaged in oral  sex rather than intercourse as he has had trouble penetrating. It seems like he has a decent erection when laying on his back but when he tries to turn over to begin intercourse, his erection seems to weaken and he cannot penetrate. They do use good lubrication during their intercourse attempts. He is not interested in pursuing further intervention for his ED, including IPP surgery.        Objective:     Exam:   Patient is a 78 year old male   No vital signs obtained due to virtual visit.  General Appearance: Well groomed, hygenic  Respiratory: No cough, no respiratory distress or labored breathing  Musculoskeletal: Grossly normal with no gross deficits  Skin: No discoloration or apparent rashes  Neurologic: No tremors  Psychiatric: Alert and oriented  Further examination is deferred due to the nature of our visit.

## 2023-05-12 NOTE — LETTER
5/12/2023       RE: Zachariah Ram  [address]  [address]  [place] [address]     Dear Colleague,    Thank you for referring your patient, Zachariah Ram, to the [facility] [place] at [facility]. Please see a copy of my visit note below.    Virtual Visit Details    Type of service:  Video Visit   Video Start Time: 3:37 PM  Video End Time: 4:01 PM    Originating Location (pt. Location): Home  Distant Location (provider location):  Off-site  Platform used for Video Visit: David     Zachariah Ram complains of   Chief Complaint   Patient presents with    Video Visit     Medication refills        Assessment/Plan:  78 year old male with a PMH of prostate cancer s/p RALP in 05/2020 with resulting ED. Has tried ICI, which has caused pain during injection and ejaculation. For the past year, has used Cialis. Unable to penetrate during intercourse attempts, however, as he feels his erection weakens a bit. Suspect he may have some vascular leaking and may benefit from using a penile constriction ring to help maintain the erection. He will look into this and get one to try. He otherwise is not interested in using the injections anymore due to pain so will continue with the Cialis. In need of refills.   -Cialis refills sent to pharmacy. 5 mg tabs and can take up to 4 of these as needed.   -Follow up with me in one year, or sooner if needed.     Arlen Abel, CNP  Department of Urology      Subjective:   Very pleasant 78 year old gentleman with PMH of prostate cancer s/p RALP in 05/2020 at [place]. Path showed [place] 4+3=7. All margins/nodes negative. PSA undetectable as of one year ago. He presents for virtual follow up regarding erectile dysfunction. He previously saw Dr. Sanchez in 02/2021 for ED which had not responded well to sildenafil (also had side effects), and was therefore started on Trimix. Was initially started on #8 but was increased to  #4.    Today, he states that he did use the injection a few times when it was initially prescribed but had trouble with pain after the injection and with ejaculation. He has since been using Cialis. Has 5 mg tablets prescribed and has taken 2-3 of these at once. He and his wife of 54 years have then engaged in oral sex rather than intercourse as he has had trouble penetrating. It seems like he has a decent erection when laying on his back but when he tries to turn over to begin intercourse, his erection seems to weaken and he cannot penetrate. They do use good lubrication during their intercourse attempts. He is not interested in pursuing further intervention for his ED, including IPP surgery.        Objective:     Exam:   Patient is a 78 year old male   No vital signs obtained due to virtual visit.  General Appearance: Well groomed, hygenic  Respiratory: No cough, no respiratory distress or labored breathing  Musculoskeletal: Grossly normal with no gross deficits  Skin: No discoloration or apparent rashes  Neurologic: No tremors  Psychiatric: Alert and oriented  Further examination is deferred due to the nature of our visit.

## 2023-05-15 NOTE — TELEPHONE ENCOUNTER
Imaging received   - no images   May 15, 2023 10:32 AM  [id number]   Facility  [facility]    Outcome Received imaging disc, attempted to upload images, no dicom. Dropped off with Dale to check if images are able to upload - Amay   *no images on disc, message sent to [facility] to notify*

## 2023-05-24 DIAGNOSIS — E03.9 HYPOTHYROIDISM, UNSPECIFIED TYPE: ICD-10-CM

## 2023-05-24 DIAGNOSIS — K21.9 GASTROESOPHAGEAL REFLUX DISEASE, UNSPECIFIED WHETHER ESOPHAGITIS PRESENT: ICD-10-CM

## 2023-05-24 RX ORDER — LEVOTHYROXINE SODIUM 100 UG/1
TABLET ORAL
Qty: 90 TABLET | Refills: 0 | Status: SHIPPED | OUTPATIENT
Start: 2023-05-24 | End: 2023-07-05

## 2023-06-02 ENCOUNTER — MYC MEDICAL ADVICE (OUTPATIENT)
Dept: CARDIOLOGY | Facility: CLINIC | Age: 78
End: 2023-06-02
Payer: MEDICARE

## 2023-06-13 ASSESSMENT — ENCOUNTER SYMPTOMS
NAUSEA: 0
WEAKNESS: 0
HEADACHES: 0
FEVER: 0
CONSTIPATION: 0
DIARRHEA: 0
DYSURIA: 0
HEARTBURN: 1
DIZZINESS: 0
JOINT SWELLING: 0
HEMATOCHEZIA: 0
PARESTHESIAS: 0
SORE THROAT: 0
ABDOMINAL PAIN: 0
HEMATURIA: 0
ARTHRALGIAS: 0
SHORTNESS OF BREATH: 0
COUGH: 0
EYE PAIN: 0
CHILLS: 0
MYALGIAS: 0
NERVOUS/ANXIOUS: 0
FREQUENCY: 0
PALPITATIONS: 0

## 2023-06-13 ASSESSMENT — ACTIVITIES OF DAILY LIVING (ADL): CURRENT_FUNCTION: NO ASSISTANCE NEEDED

## 2023-06-19 ENCOUNTER — OFFICE VISIT (OUTPATIENT)
Dept: INTERNAL MEDICINE | Facility: CLINIC | Age: 78
End: 2023-06-19
Payer: MEDICARE

## 2023-06-19 VITALS
SYSTOLIC BLOOD PRESSURE: 100 MMHG | OXYGEN SATURATION: 96 % | HEIGHT: 69 IN | TEMPERATURE: 98.2 F | BODY MASS INDEX: 28.13 KG/M2 | DIASTOLIC BLOOD PRESSURE: 64 MMHG | HEART RATE: 56 BPM | WEIGHT: 189.9 LBS

## 2023-06-19 DIAGNOSIS — C61 MALIGNANT NEOPLASM OF PROSTATE (H): ICD-10-CM

## 2023-06-19 DIAGNOSIS — I48.92 ATRIAL FLUTTER, UNSPECIFIED TYPE (H): ICD-10-CM

## 2023-06-19 DIAGNOSIS — I10 BENIGN ESSENTIAL HYPERTENSION: ICD-10-CM

## 2023-06-19 DIAGNOSIS — E78.5 HYPERLIPIDEMIA, UNSPECIFIED HYPERLIPIDEMIA TYPE: ICD-10-CM

## 2023-06-19 DIAGNOSIS — Z00.00 ENCOUNTER FOR MEDICARE ANNUAL WELLNESS EXAM: Primary | ICD-10-CM

## 2023-06-19 DIAGNOSIS — K21.9 GASTROESOPHAGEAL REFLUX DISEASE, UNSPECIFIED WHETHER ESOPHAGITIS PRESENT: ICD-10-CM

## 2023-06-19 DIAGNOSIS — E03.9 HYPOTHYROIDISM, UNSPECIFIED TYPE: ICD-10-CM

## 2023-06-19 DIAGNOSIS — I48.0 PAROXYSMAL ATRIAL FIBRILLATION (H): ICD-10-CM

## 2023-06-19 PROBLEM — H40.9 GLAUCOMA: Status: ACTIVE | Noted: 2021-07-16

## 2023-06-19 PROBLEM — Q20.8 ABNORMALITY OF LEFT ATRIAL APPENDAGE: Status: ACTIVE | Noted: 2018-08-28

## 2023-06-19 PROBLEM — Z79.01 LONG TERM (CURRENT) USE OF ANTICOAGULANTS: Status: ACTIVE | Noted: 2018-07-27

## 2023-06-19 PROBLEM — L21.9 SEBORRHEIC DERMATITIS OF SCALP: Status: ACTIVE | Noted: 2018-07-06

## 2023-06-19 PROBLEM — I44.0 FIRST DEGREE ATRIOVENTRICULAR BLOCK: Status: ACTIVE | Noted: 2023-06-19

## 2023-06-19 PROBLEM — M85.80 OSTEOPENIA: Status: ACTIVE | Noted: 2019-07-08

## 2023-06-19 PROBLEM — R97.20 HIGH PROSTATE SPECIFIC ANTIGEN (PSA): Status: ACTIVE | Noted: 2018-11-13

## 2023-06-19 PROBLEM — N40.0 BENIGN PROSTATIC HYPERPLASIA: Status: ACTIVE | Noted: 2023-06-19

## 2023-06-19 PROBLEM — G45.9 BRAIN TIA: Status: ACTIVE | Noted: 2023-03-19

## 2023-06-19 PROBLEM — E66.3 OVERWEIGHT: Status: ACTIVE | Noted: 2023-02-14

## 2023-06-19 PROBLEM — Z85.46 PERSONAL HISTORY OF MALIGNANT NEOPLASM OF PROSTATE: Status: ACTIVE | Noted: 2023-05-10

## 2023-06-19 PROBLEM — Z86.73 HISTORY OF TRANSIENT ISCHEMIC ATTACK: Status: ACTIVE | Noted: 2023-05-10

## 2023-06-19 PROBLEM — N52.9 ERECTILE DYSFUNCTION: Status: ACTIVE | Noted: 2019-05-15

## 2023-06-19 PROBLEM — R93.89 ABNORMAL RESULTS ON IMAGING STUDY OF GENITOURINARY SYSTEM: Status: ACTIVE | Noted: 2019-12-26

## 2023-06-19 PROBLEM — R93.1 ABNORMAL ECHOCARDIOGRAPHY: Status: ACTIVE | Noted: 2023-05-10

## 2023-06-19 PROBLEM — R00.1 PERSISTENT SINUS BRADYCARDIA: Status: ACTIVE | Noted: 2023-06-19

## 2023-06-19 PROBLEM — K63.5 POLYP OF COLON: Status: ACTIVE | Noted: 2023-06-19

## 2023-06-19 PROBLEM — N42.32 ATYPICAL SMALL ACINAR PROLIFERATION OF PROSTATE: Status: ACTIVE | Noted: 2019-12-26

## 2023-06-19 PROBLEM — R09.81 NASAL CONGESTION: Status: ACTIVE | Noted: 2019-05-15

## 2023-06-19 PROBLEM — R35.0 URINARY FREQUENCY: Status: ACTIVE | Noted: 2021-01-05

## 2023-06-19 PROBLEM — N40.0 BPH WITHOUT URINARY OBSTRUCTION: Status: ACTIVE | Noted: 2018-07-06

## 2023-06-19 PROBLEM — F51.04 PSYCHOPHYSIOLOGICAL INSOMNIA: Status: ACTIVE | Noted: 2023-06-19

## 2023-06-19 LAB
PSA SERPL DL<=0.01 NG/ML-MCNC: <0.01 NG/ML (ref 0–6.5)
TSH SERPL DL<=0.005 MIU/L-ACNC: 1.82 UIU/ML (ref 0.3–4.2)

## 2023-06-19 PROCEDURE — 99214 OFFICE O/P EST MOD 30 MIN: CPT | Mod: 25 | Performed by: INTERNAL MEDICINE

## 2023-06-19 PROCEDURE — 36415 COLL VENOUS BLD VENIPUNCTURE: CPT | Performed by: INTERNAL MEDICINE

## 2023-06-19 PROCEDURE — 84443 ASSAY THYROID STIM HORMONE: CPT | Performed by: INTERNAL MEDICINE

## 2023-06-19 PROCEDURE — 84153 ASSAY OF PSA TOTAL: CPT | Performed by: INTERNAL MEDICINE

## 2023-06-19 PROCEDURE — G0439 PPPS, SUBSEQ VISIT: HCPCS | Performed by: INTERNAL MEDICINE

## 2023-06-19 RX ORDER — ACETAMINOPHEN 500 MG
500 TABLET ORAL
COMMUNITY

## 2023-06-19 ASSESSMENT — ENCOUNTER SYMPTOMS
FEVER: 0
ARTHRALGIAS: 0
JOINT SWELLING: 0
ABDOMINAL PAIN: 0
HEMATOCHEZIA: 0
WEAKNESS: 0
CHILLS: 0
EYE PAIN: 0
HEADACHES: 0
DIZZINESS: 0
NAUSEA: 0
DYSURIA: 0
HEMATURIA: 0
PALPITATIONS: 0
DIARRHEA: 0
FREQUENCY: 0
NERVOUS/ANXIOUS: 0
PARESTHESIAS: 0
HEARTBURN: 1
SORE THROAT: 0
CONSTIPATION: 0
COUGH: 0
MYALGIAS: 0
SHORTNESS OF BREATH: 0

## 2023-06-19 ASSESSMENT — ACTIVITIES OF DAILY LIVING (ADL): CURRENT_FUNCTION: NO ASSISTANCE NEEDED

## 2023-06-19 ASSESSMENT — PAIN SCALES - GENERAL: PAINLEVEL: NO PAIN (0)

## 2023-06-19 NOTE — PROGRESS NOTES
"    The patient was provided with written information regarding signs of hearing loss.  Information on urinary incontinence and treatment options given to patient.        Answers for HPI/ROS submitted by the patient on 6/13/2023  In general, how would you rate your overall physical health?: good  Frequency of exercise:: 4-5 days/week  Do you usually eat at least 4 servings of fruit and vegetables a day, include whole grains & fiber, and avoid regularly eating high fat or \"junk\" foods? : Yes  Taking medications regularly:: Yes  Medication side effects:: None  Activities of Daily Living: no assistance needed  Home safety: lack of grab bars in the bathroom  Hearing Impairment:: difficulty following a conversation in a noisy restaurant or crowded room  In the past 6 months, have you been bothered by leaking of urine?: Yes  abdominal pain: No  Blood in stool: No  Blood in urine: No  chest pain: No  chills: No  congestion: No  constipation: No  cough: No  diarrhea: No  dizziness: No  ear pain: No  eye pain: No  nervous/anxious: No  fever: No  frequency: No  genital sores: No  headaches: No  hearing loss: Yes  heartburn: Yes  arthralgias: No  joint swelling: No  peripheral edema: No  mood changes: No  myalgias: No  nausea: No  dysuria: No  palpitations: No  Skin sensation changes: No  sore throat: No  urgency: No  rash: No  shortness of breath: No  visual disturbance: No  weakness: No  impotence: Yes  penile discharge: No  In general, how would you rate your overall mental or emotional health?: excellent  Additional concerns today:: Yes  Duration of exercise:: 30-45 minutes      "

## 2023-06-19 NOTE — PATIENT INSTRUCTIONS
"     We are rechecking your labs.  I will let you know if the results indicate any changes in your therapy.  Unless you hear otherwise, continue all medications at the same doses.  Contact your usual pharmacy if you need refills.      Assuming your labs look good, then continue all medications at the same doses.  Contact your usual pharmacy if you need refills.     Continue all medications at the same doses.  Contact your usual pharmacy if you need refills.      Return to see me in 1 year, sooner if needed.  Use [employer] or Call [phone] to schedule the appointment with me.           5 GOALS TO PREVENT VASCULAR DISEASE:     1.  Aggressive blood pressure control, under 130/80 ideally.  Using medications if needed.    Your blood pressure is under good control    BP Readings from Last 4 Encounters:   06/19/23 100/64   11/21/22 133/75   08/30/22 131/68   08/10/22 134/68       2.  Aggressive LDL cholesterol (\"bad cholesterol\") lowering as indicated.    Your goal is an LDL under 130 for sure, preferably under 100.  (If you have diabetes or previous vascular disease, the the LDL goals would be under 100 for sure, preferably under 70.)    New guidelines identify four high-risk groups who could benefit from statins:   *people with pre-existing heart disease, such as those who have had a heart attack;   *people ages 40 to 75 who have diabetes of any type  *patients ages 40 to 75 with at least a 7.5% risk of developing cardiovascular disease over the next decade, according to a formula described in the guidelines  *patients with the sort of super-high cholesterol that sometimes runs in families, as evidenced by an LDL of 190 milligrams per deciliter or higher    Your cholesterol levels are well controlled.    Recent Labs   Lab Test 03/20/23  0518 09/09/22  1138 05/27/22  1150 06/29/17  1025 05/28/15  0942   CHOL  --  120 190 210* 194   HDL  --  62 63 63 63   LDL  --  37 101* 130* 111   TRIG 111 107 130 83 100   CHOLHDLRATIO "  --   --   --  3.3 3.1       3.  Aggressive diabetic prevention, screening and/or management.      You do not have diabetes as of the most recent blood tests.     4.  No smoking    5.  Consider daily preventative aspirin over age 50 if you have enough cardiac risk factors to place you at higher risk for the presence of vascular disease.    If you have any reason not to take aspirin such easy bruising or bleeding, stomach problems, other anticoagulant medications, or any other side effects, then you should not take Aspirin.     --Based on prior side effects from aspirin or other medications for which aspirin would cause potential problems, you should NOT take daily preventative aspirin.        Preventive Health Recommendations:   Male Ages 65 and over    Yearly exam:             See your health care provider every year in order to  o   Review health changes.   o   Discuss preventive care.    o   Review your medicines if your doctor has prescribed any.  Talk with your health care provider about whether you should have a test to screen for prostate cancer (PSA).  Every 3 years, have a diabetes test (fasting glucose). If you are at risk for diabetes, you should have this test more often.  Every 5 years, have a cholesterol test. Have this test more often if you are at risk for high cholesterol or heart disease.   Every 10 years, have a colonoscopy. Or, have a yearly FIT test (stool test). These exams will check for colon cancer.  Talk to with your health care provider about screening for Abdominal Aortic Aneurysm if you have a family history of AAA or have a history of smoking.    Shots:   Get a flu shot each year.   Get a tetanus shot every 10 years.   Talk to your doctor about your pneumonia vaccines. There are now two you should receive - Pneumovax (PPSV 23) and Prevnar (PCV 13).   Talk to your doctor about a shingles vaccine.   Talk to your doctor about the hepatitis B vaccine.  Nutrition:   Eat at least 5 servings  "of fruits and vegetables each day.   Eat whole-grain bread, whole-wheat pasta and brown rice instead of white grains and rice.   Talk to your provider about Calcium and Vitamin D.      --Good Grains:  Oats, brown rice, Quinoa (these do not raise the blood sugar as much)     --Bad grains:  Anything made from wheat or white rice     (because these raise the blood sugars significantly, and the possible gluten issue from wheat for some people).      --Proteins:  Aim for \"lean proteins\" including chicken, fish, seafood, pork, turkey, and eggs (in moderation); Eat red meat only occasionally    Lifestyle  Exercise for at least 150 minutes a week (30 minutes a day, 5 days a week). This will help you control your weight and prevent disease.   Limit alcohol to one drink per day.   No smoking.   Wear sunscreen to prevent skin cancer.   See your dentist every six months for an exam and cleaning.   See your eye doctor every 1 to 2 years to screen for conditions such as glaucoma, macular degeneration, cataracts, etc         Patient Education   Personalized Prevention Plan  You are due for the preventive services outlined below.  Your care team is available to assist you in scheduling these services.  If you have already completed any of these items, please share that information with your care team to update in your medical record.  Health Maintenance Due   Topic Date Due    COVID-19 Vaccine (6 - Pfizer series) 02/14/2023    ANNUAL REVIEW OF HM ORDERS  05/27/2023    Annual Wellness Visit  05/27/2023       Signs of Hearing Loss  Hearing loss is a problem shared by many people. In fact, it's one of the most common health problems, particularly as people age. Most people aged 65 and older have some hearing loss. By age 80, almost everyone does. Hearing loss often occurs slowly over the years. So, you may not realize your hearing has gotten worse.   When sudden hearing loss occurs, it's important to contact your healthcare provider " right away. Your provider will do a medical exam and a hearing exam as soon as possible. This is to help find the cause and type of your sudden hearing loss. Based on your diagnosis, your healthcare provider will discuss possible treatments.      Hearing much better with one ear can be a sign of hearing loss.     Have your hearing checked  Call your healthcare provider if you:   Have to strain to hear normal conversation  Have to watch other people s faces very carefully to follow what they re saying  Need to ask people to repeat what they ve said  Often misunderstand what people are saying  Turn the volume of the television or radio up so high that others complain  Feel that people are mumbling when they re talking to you  Find that the effort to hear leaves you feeling tired and irritated  Notice, when using the phone, that you hear better with one ear than the other  [employer] last reviewed this educational content on 6/1/2022 2000-2022 [employer]. All rights reserved. This information is not intended as a substitute for professional medical care. Always follow your healthcare professional's instructions.          Urinary Incontinence (Male)  We understand that gender is a spectrum. We may use gendered terms to talk about anatomy and health risk. Please use this sheet in a way that works best for you and your provider as you talk about your care.     Urinary incontinence means not being able to control the release of urine from the bladder.   Causes  Common causes of urinary incontinence in men include:  Infection  Certain medicines  Aging  Poor pelvic muscle tone  Bladder spasms  Obesity  Enlarged prostate  Trouble urinating and fully emptying the bladder (urinary retention)  Other things that can cause incontinence are:   Nervous system diseases  Diabetes  Sleep apnea  Urinary tract infections  Prostate surgery  Pelvic injury  Constipation and smoking have also been identified as risk factors.    Symptoms  Urge incontinence (overactive bladder). This is a sudden urge to urinate. It occurs even though there may not be much urine in the bladder. The need to urinate often during the night is common. It's due to overactive bladder muscles.  Stress incontinence. This is urine leakage that you can't control. It can occur with sneezing, coughing, and other actions that put stress on the bladder.    Treatment  Treatment depends on what is causing the condition. Bladder infections are treated with antibiotics. Urinary retention is treated with a bladder catheter.   Home care  Follow these guidelines when caring for yourself at home:  Don't have any foods and drinks that may irritate the bladder. This includes:  Chocolate  Alcohol  Caffeine  Carbonated drinks  Acidic fruits and juices  Limit fluids to 6 to 8 cups a day.  Lose weight if you are overweight. This may reduce your symptoms.  If advised, do regular pelvic muscle-strengthening exercises such as Kegel exercises.  If needed, wear absorbent pads to catch urine. Change the pads often. This is for good hygiene and to prevent skin and bladder infections.  Bathe daily for good hygiene.  If an antibiotic was prescribed to treat a bladder infection, take it until it's finished. Keep taking it even if you are feeling better. This is to make sure your infection has cleared.  If a catheter was left in place, keep bacteria from getting into the collection bag. Don't disconnect the catheter from the collection bag.  Use a leg band to secure the catheter drainage tube, so it does not pull on the catheter. Drain the collection bag when it becomes full. To do this, use the drain spout at the bottom of the bag. Don't disconnect the bag from the catheter.  Don't pull on or try to remove a catheter. The catheter must be removed by a healthcare provider.  If you smoke, stop. Ask your provider for help if you can't do this on your own.  Follow-up care  Follow up with your  healthcare provider, or as advised.  When to get medical advice  Call your healthcare provider right away if any of these occur:   Fever over 100.4 F (38 C), or as directed by your provider  Bladder pain or fullness  Belly swelling, nausea, or vomiting  Back pain  Weakness, dizziness, or fainting  If a catheter was left in place, return if:  The catheter falls out  The catheter stops draining for 6 hours  Your urine gets cloudy or smells bad  [employer] last reviewed this educational content on 6/1/2022 2000-2022 [employer]. All rights reserved. This information is not intended as a substitute for professional medical care. Always follow your healthcare professional's instructions.

## 2023-06-19 NOTE — PROGRESS NOTES
"SUBJECTIVE:   Zachariah is a 78 year old who presents for Preventive Visit.      6/19/2023     8:31 AM   Additional Questions   Roomed by Antoinette BARRAGAN CMA     Are you in the first 12 months of your Medicare coverage?  No    Healthy Habits:     In general, how would you rate your overall health?  Good    Frequency of exercise:  4-5 days/week    Duration of exercise:  30-45 minutes    Do you usually eat at least 4 servings of fruit and vegetables a day, include whole grains    & fiber and avoid regularly eating high fat or \"junk\" foods?  Yes    Taking medications regularly:  Yes    Barriers to taking medications:  None    Medication side effects:  None    Ability to successfully perform activities of daily living:  No assistance needed    Home Safety:  Lack of grab bars in the bathroom    Hearing Impairment:  Difficulty following a conversation in a noisy restaurant or crowded room    In the past 6 months, have you been bothered by leaking of urine? Yes    In general, how would you rate your overall mental or emotional health?  Excellent      PHQ-2 Total Score: 0    Additional concerns today:  Yes    Have you ever done Advance Care Planning? (For example, a Health Directive, POLST, or a discussion with a medical provider or your loved ones about your wishes): No, advance care planning information given to patient to review.  Patient plans to discuss their wishes with loved ones or provider.      Fall risk  Fallen 2 or more times in the past year?: No  Any fall with injury in the past year?: Yes    Cognitive Screening   1) Repeat 3 items (Leader, Season, Table)    2) Clock draw: NORMAL  3) 3 item recall: Recalls 3 objects  Results: 3 items recalled: COGNITIVE IMPAIRMENT LESS LIKELY    Mini-CogTM Copyright СВЕТЛАНА Reyes. Licensed by the author for use in [facility]; reprinted with permission (fortino@.[facility]). All rights reserved.      Do you have sleep apnea, excessive snoring or daytime drowsiness?: yes " HEATHER    Reviewed and updated as needed this visit by clinical staff   Tobacco  Allergies  Meds   Med Hx  Surg Hx           Reviewed and updated as needed this visit by Provider       Med Hx  Surg Hx          Social History     Tobacco Use     Smoking status: Never     Smokeless tobacco: Never   Vaping Use     Vaping status: Never Used   Substance Use Topics     Alcohol use: Yes     Comment: 3-4 ounces of venkat with water 2 per week.         6/13/2023     2:06 PM   Alcohol Use   Prescreen: >3 drinks/day or >7 drinks/week? No     Do you have a current opioid prescription? No  Do you use any other controlled substances or medications that are not prescribed by a provider? Alcohol    Recent TIA event March 2023 while in [place].  Had a brief spell of dizziness and weakness that resolved.  Extensive cardiac and neurology work-up and assessment concluded likely TIA, possibly due to atrial fibrillation.    Has history of atrial fibrillation for many years.    No palpitations, no dizziness, no dyspnea on exertion, no shortness of breath.  No racing heart, no fast heart rates.    Taking medications as ordered, no side effects reported.   Patient is taking anticoagulation according to direction, with no reported side effects.     Has left atrial appendage resected during mitral valve replacement approximate 20 years ago.  Most recent transesophageal echocardiogram showed a previous left atrial appendage repair with clips, but still had a small trickle of blood flow into that area.  He is following up with cardiology to discuss a possible Watchman device.    2.    Hypertension:  History of hypertension, on medication.  No reported side effects from medications.    Reviewed last 6 BP readings in chart:  BP Readings from Last 6 Encounters:   06/19/23 100/64   11/21/22 133/75   08/30/22 131/68   08/10/22 134/68   05/27/22 136/77   11/22/21 131/70     No active cardiac complaints or symptoms with exercise.     3.  Prior  prostate cancer, status post da Rachel robot-assisted prostatectomy in May 2020.  PSAs remained undetectable since that time.  Taking tadalafil as needed for erectile dysfunction since the procedure.    4.  History of hypothyroidism.  On replacement therapy.  He has not experienced any significant side effects of this medication.   The patient denies of fatigue, weight changes, heat/cold intolerance, hair changes, nail changes, bowel changes.     Thyroid checked March 2023, TSH minimally elevated.    Latest labs reviewed:    Lab Results   Component Value Date    TSH 2.87 05/27/2022    TSH 2.06 06/15/2021          5.  GERD stable.  Taking meds as ordered, no reported side effects from medicines.  Eating properly to avoid sx.   No melena, no hematochezia, no diarrhea.  No unintended weigth loss.  No dysphagia.  Reports no significant regular difficulties swallowing foods or medications.       Current providers sharing in care for this patient include:   Patient Care Team:  Edu Reyes MD as PCP - General (Internal Medicine)  Juan Cornelius MD, MD as MD (Urology)  Tommy Sanchez MD as MD (Urology)  Edu Reyes MD as Assigned PCP  Nhan Santiago DO as Assigned Musculoskeletal Provider  Sera Gayle PA-C as Assigned Heart and Vascular Provider  Katja Godoy MD as MD (Neurology)  Arlen Abel CNP as Assigned Surgical Provider  Katja Godoy MD as Assigned Neuroscience Provider    The following health maintenance items are reviewed in Epic and correct as of today:  Health Maintenance   Topic Date Due     COVID-19 Vaccine (6 - Pfizer series) 02/14/2023     TSH W/FREE T4 REFLEX  03/20/2024     MEDICARE ANNUAL WELLNESS VISIT  06/19/2024     ANNUAL REVIEW OF HM ORDERS  06/19/2024     FALL RISK ASSESSMENT  06/19/2024     LIPID  03/20/2028     ADVANCE CARE PLANNING  06/19/2028     DTAP/TDAP/TD IMMUNIZATION (3 - Td or Tdap) 08/13/2030     HEPATITIS C SCREENING   "Completed     PHQ-2 (once per calendar year)  Completed     INFLUENZA VACCINE  Completed     Pneumococcal Vaccine: 65+ Years  Completed     ZOSTER IMMUNIZATION  Completed     IPV IMMUNIZATION  Aged Out     MENINGITIS IMMUNIZATION  Aged Out     COLORECTAL CANCER SCREENING  Discontinued       **I reviewed the information recorded in the patient's EPIC chart (including but not limited to medical history, surgical history, family history, problem list, medication list, and allergy list) and updated the information as indicated based on the patients reported information.             Review of Systems   Constitutional: Negative for chills and fever.   HENT: Positive for hearing loss. Negative for congestion, ear pain and sore throat.    Eyes: Negative for pain and visual disturbance.   Respiratory: Negative for cough and shortness of breath.    Cardiovascular: Negative for chest pain, palpitations and peripheral edema.   Gastrointestinal: Positive for heartburn. Negative for abdominal pain, constipation, diarrhea, hematochezia and nausea.   Genitourinary: Positive for impotence. Negative for dysuria, frequency, genital sores, hematuria, penile discharge and urgency.   Musculoskeletal: Negative for arthralgias, joint swelling and myalgias.   Skin: Negative for rash.   Neurological: Negative for dizziness, weakness, headaches and paresthesias.   Psychiatric/Behavioral: Negative for mood changes. The patient is not nervous/anxious.      Constitutional, HEENT, cardiovascular, pulmonary, gi and gu systems are negative, except as otherwise noted.    OBJECTIVE:   /64   Pulse 56   Temp 98.2  F (36.8  C) (Oral)   Ht 1.753 m (5' 9\")   Wt 86.1 kg (189 lb 14.4 oz)   SpO2 96%   BMI 28.04 kg/m   Estimated body mass index is 28.04 kg/m  as calculated from the following:    Height as of this encounter: 1.753 m (5' 9\").    Weight as of this encounter: 86.1 kg (189 lb 14.4 oz).  Physical Exam    GENERAL alert and no " distress  EYES:  Normal sclera,conjunctiva, EOMI  HENT: oral and posterior pharynx without lesions or erythema, facies symmetric  NECK: Neck supple. No LAD, without thyroidmegaly.  RESP: Clear to ausculation bilaterally without wheezes or crackles. Normal BS in all fields.  CV: Irregular rhythm, regular rate.  No murmurs gallops or rubs.  LYMPH: no cervical lymph adenopathy appreciated  MS: extremities- no gross deformities of the visible extremities noted,   EXT:  no lower extremity edema  PSYCH: Alert and oriented times 3; speech- coherent  SKIN:  No obvious significant skin lesions on visible portions of face     Diagnostic Test Results:  Labs reviewed in Epic    ASSESSMENT / PLAN:     (Z00.00) Encounter for Medicare annual wellness exam  (primary encounter diagnosis)  Comment: Discussed cardiac disease risk factors and cardiac disease risk factor modification, including diabetes screening, blood pressure screening (and management if indicated), and cholesterol screening.   Reviewed immunzation guidelines, including pneumococcal vaccines, annual influenza, and shingles vaccines.   Discussed routine cancer screenings, including skin cancer, colon cancer screening for everyone until age 80, prostate cancer screening in men until age 75, mammogram and PAP/pelvic for women until age 75.   Recommended regular dentist visits to care for remaining teeth.   Recommended regular screening for vision and glaucoma.   Recommended safe driving and accident avoidance.   Plan: REVIEW OF HEALTH MAINTENANCE PROTOCOL ORDERS,         PRIMARY CARE FOLLOW-UP SCHEDULING, PRIMARY CARE        FOLLOW-UP SCHEDULING            (I48.0) Paroxysmal atrial fibrillation (H)  Comment: Known issue that I take into account for their medical decisions; no current exacerbations, or new concerns.  This condition is currently controlled on the current medical regimen.  Continue current therapy.   Plan: REVIEW OF HEALTH MAINTENANCE PROTOCOL ORDERS,          PRIMARY CARE FOLLOW-UP SCHEDULING, PRIMARY CARE        FOLLOW-UP SCHEDULING            (I48.92) Atrial flutter, unspecified type (H)  Comment: Known issue that I take into account for their medical decisions; no current exacerbations, or new concerns.  This condition is currently controlled on the current medical regimen.  Continue current therapy.   Plan: REVIEW OF HEALTH MAINTENANCE PROTOCOL ORDERS,         PRIMARY CARE FOLLOW-UP SCHEDULING, PRIMARY CARE        FOLLOW-UP SCHEDULING            (E03.9) Hypothyroidism, unspecified type  Comment: Last TSH minimally elevated in [place].  But this was drawn while the patient was acutely ill.  Repeat labs today, adjust medication as indicated by lab results.  Plan: REVIEW OF HEALTH MAINTENANCE PROTOCOL ORDERS,         PRIMARY CARE FOLLOW-UP SCHEDULING, TSH with         free T4 reflex, PRIMARY CARE FOLLOW-UP         SCHEDULING            (I10) Benign essential hypertension  Comment: This condition is currently controlled on the current medical regimen.  Continue current therapy.   Plan: REVIEW OF HEALTH MAINTENANCE PROTOCOL ORDERS,         PRIMARY CARE FOLLOW-UP SCHEDULING, PRIMARY CARE        FOLLOW-UP SCHEDULING            (E78.5) Hyperlipidemia, unspecified hyperlipidemia type  Comment: This condition is currently controlled on the current medical regimen.  Continue current therapy.   Continue statin for maximal cardiac disease risk factor modification.  Plan: REVIEW OF HEALTH MAINTENANCE PROTOCOL ORDERS,         PRIMARY CARE FOLLOW-UP SCHEDULING, PRIMARY CARE        FOLLOW-UP SCHEDULING            (K21.9) Gastroesophageal reflux disease, unspecified whether esophagitis present  Comment: This condition is currently controlled on the current medical regimen.  Continue current therapy.   Plan: REVIEW OF HEALTH MAINTENANCE PROTOCOL ORDERS,         PRIMARY CARE FOLLOW-UP SCHEDULING, PRIMARY CARE        FOLLOW-UP SCHEDULING            (C61) Malignant neoplasm of prostate  "s/p Davinci prostatectomy May 2020  (H)  Comment: Repeat labs, refer back to urology if PSA becomes detectable.  Plan: REVIEW OF HEALTH MAINTENANCE PROTOCOL ORDERS,         PRIMARY CARE FOLLOW-UP SCHEDULING, PSA, tumor         marker, PRIMARY CARE FOLLOW-UP SCHEDULING               Patient has been advised of split billing requirements and indicates understanding: Yes      COUNSELING:  Reviewed preventive health counseling, as reflected in patient instructions       Regular exercise       Healthy diet/nutrition       Vision screening       Hearing screening       Dental care       Bladder control       Fall risk prevention       Immunizations    up to date    Covid vaccines are now recommended annually.  Get the most updated Covid vaccine when it becomes available, consider getting this at the same time as the annual influenza vaccine.              Aspirin prophylaxis        Colon cancer screening       Prostate cancer screening      BMI:   Estimated body mass index is 28.04 kg/m  as calculated from the following:    Height as of this encounter: 1.753 m (5' 9\").    Weight as of this encounter: 86.1 kg (189 lb 14.4 oz).         He reports that he has never smoked. He has never used smokeless tobacco.      Appropriate preventive services were discussed with this patient, including applicable screening as appropriate for cardiovascular disease, diabetes, osteopenia/osteoporosis, and glaucoma.  As appropriate for age/gender, discussed screening for colorectal cancer, prostate cancer, breast cancer, and cervical cancer. Checklist reviewing preventive services available has been given to the patient.    Reviewed patients plan of care and provided an AVS. The  for Zachariah meets the Care Plan requirement. This Care Plan has been established and reviewed with the .          *Reviewed extensive labs and documentation from all the specialty care including the [facility] and his hospitalization in March in [place].  "     Brian Moody MD  [facility]    Identified Health Risks:

## 2023-07-03 DIAGNOSIS — K21.9 GASTROESOPHAGEAL REFLUX DISEASE, UNSPECIFIED WHETHER ESOPHAGITIS PRESENT: ICD-10-CM

## 2023-07-03 DIAGNOSIS — E03.9 HYPOTHYROIDISM, UNSPECIFIED TYPE: ICD-10-CM

## 2023-07-05 RX ORDER — LEVOTHYROXINE SODIUM 100 UG/1
TABLET ORAL
Qty: 90 TABLET | Refills: 3 | Status: SHIPPED | OUTPATIENT
Start: 2023-07-05 | End: 2024-06-21

## 2023-09-03 DIAGNOSIS — E78.5 HYPERLIPIDEMIA, UNSPECIFIED HYPERLIPIDEMIA TYPE: ICD-10-CM

## 2023-09-05 RX ORDER — ROSUVASTATIN CALCIUM 20 MG/1
20 TABLET, COATED ORAL DAILY
Qty: 90 TABLET | Refills: 2 | Status: SHIPPED | OUTPATIENT
Start: 2023-09-05 | End: 2024-05-28

## 2023-09-25 ENCOUNTER — OFFICE VISIT (OUTPATIENT)
Dept: FAMILY MEDICINE | Facility: CLINIC | Age: 78
End: 2023-09-25
Payer: MEDICARE

## 2023-09-25 VITALS
HEIGHT: 69 IN | HEART RATE: 53 BPM | TEMPERATURE: 97.7 F | DIASTOLIC BLOOD PRESSURE: 65 MMHG | OXYGEN SATURATION: 95 % | RESPIRATION RATE: 16 BRPM | SYSTOLIC BLOOD PRESSURE: 128 MMHG | WEIGHT: 193 LBS | BODY MASS INDEX: 28.58 KG/M2

## 2023-09-25 DIAGNOSIS — I48.0 PAROXYSMAL ATRIAL FIBRILLATION (H): ICD-10-CM

## 2023-09-25 DIAGNOSIS — I10 BENIGN ESSENTIAL HYPERTENSION: ICD-10-CM

## 2023-09-25 DIAGNOSIS — Z95.818 PRESENCE OF WATCHMAN LEFT ATRIAL APPENDAGE CLOSURE DEVICE: Primary | ICD-10-CM

## 2023-09-25 DIAGNOSIS — I48.92 ATRIAL FLUTTER, UNSPECIFIED TYPE (H): ICD-10-CM

## 2023-09-25 PROCEDURE — 99214 OFFICE O/P EST MOD 30 MIN: CPT | Performed by: NURSE PRACTITIONER

## 2023-09-25 RX ORDER — FLECAINIDE ACETATE 100 MG/1
100 TABLET ORAL 2 TIMES DAILY
Qty: 180 TABLET | Refills: 3 | Status: SHIPPED | OUTPATIENT
Start: 2023-09-25 | End: 2023-09-25

## 2023-09-25 RX ORDER — FLECAINIDE ACETATE 100 MG/1
100 TABLET ORAL 2 TIMES DAILY
Qty: 180 TABLET | Refills: 3 | Status: SHIPPED | OUTPATIENT
Start: 2023-09-25 | End: 2024-08-30

## 2023-09-25 RX ORDER — ASPIRIN 81 MG/1
81 TABLET ORAL DAILY
COMMUNITY
End: 2024-07-16

## 2023-09-25 RX ORDER — LOSARTAN POTASSIUM 50 MG/1
50 TABLET ORAL 2 TIMES DAILY
Qty: 180 TABLET | Refills: 3 | Status: SHIPPED | OUTPATIENT
Start: 2023-09-25 | End: 2023-09-25

## 2023-09-25 RX ORDER — LOSARTAN POTASSIUM 50 MG/1
50 TABLET ORAL 2 TIMES DAILY
Qty: 180 TABLET | Refills: 3 | Status: SHIPPED | OUTPATIENT
Start: 2023-09-25 | End: 2024-06-21

## 2023-09-25 ASSESSMENT — PAIN SCALES - GENERAL: PAINLEVEL: NO PAIN (0)

## 2023-09-25 NOTE — PROGRESS NOTES
Assessment & Plan     (Z95.818) Presence of Watchman left atrial appendage closure device  (primary encounter diagnosis)  Comment: doing great since outpatient procedure for watchman placement. No concerns today   Plan:     (I48.0) Paroxysmal atrial fibrillation (H)  Comment: refilled meds  Plan: apixaban ANTICOAGULANT (ELIQUIS ANTICOAGULANT)         5 MG tablet, flecainide (TAMBOCOR) 100 MG         tablet, DISCONTINUED: apixaban ANTICOAGULANT         (ELIQUIS ANTICOAGULANT) 5 MG tablet,         DISCONTINUED: flecainide (TAMBOCOR) 100 MG         tablet            (I48.92) Atrial flutter, unspecified type (H)  Comment: refilled meds  Plan: apixaban ANTICOAGULANT (ELIQUIS ANTICOAGULANT)         5 MG tablet, flecainide (TAMBOCOR) 100 MG         tablet, DISCONTINUED: apixaban ANTICOAGULANT         (ELIQUIS ANTICOAGULANT) 5 MG tablet,         DISCONTINUED: flecainide (TAMBOCOR) 100 MG         tablet            (I10) Benign essential hypertension  Comment: refilled meds  Plan: losartan (COZAAR) 50 MG tablet, DISCONTINUED:         losartan (COZAAR) 50 MG tablet                 MED REC REQUIRED  Post Medication Reconciliation Status: discharge medications reconciled, continue medications without change    [facility] CAIN Soni is a 78 year old, presenting for the following health issues:  No chief complaint on file.      HPI         Hospital Follow-up Visit:    Hospital/Nursing Home/IP Rehab Facility:  [facility] in [place], [place]   Date of Admission: 09/18/2023  Date of Discharge:   Reason(s) for Admission:     Atrial Fibrillation Longstanding Persistent (HCC) (Primary Dx);  Atrial Fibrillation Paroxysmal (HCC)  Discharge Disposition: Home or Self Care     Was your hospitalization related to COVID-19? No   Problems taking medications regularly:  None  Medication changes since discharge: None  Problems adhering to non-medication  "therapy:  None    Summary of hospitalization:  CareEverywhere information obtained and reviewed  Diagnostic Tests/Treatments reviewed.  Follow up needed: none  Other Healthcare Providers Involved in Patient s Care:         Specialist appointment - cadiology  Update since discharge: no concerns         Plan of care communicated with patient           Just had outpt procedure: watchman device placed at [place]  Has a lot of bruising of the groin and a firm area about the size of a quarter. The bruising is done spreading. Just noticed the firmness today because he didn't want to touch it before. Otherwise it seems to be doing just fine.    Is taking eliquis and baby ASA x6 weeks   No chest pain, SOB   Has been able to get out walking about half hour  He typically is quite active     Review of Systems   Detailed as above         Objective    /65 (BP Location: Left arm, Patient Position: Sitting, Cuff Size: Adult Regular)   Pulse 53   Temp 97.7  F (36.5  C) (Temporal)   Resp 16   Ht 1.753 m (5' 9\")   Wt 87.5 kg (193 lb)   SpO2 95%   BMI 28.50 kg/m    There is no height or weight on file to calculate BMI.  Physical Exam   NAD  Normal mood and affect                       "

## 2023-11-13 ENCOUNTER — TELEPHONE (OUTPATIENT)
Dept: UROLOGY | Facility: CLINIC | Age: 78
End: 2023-11-13
Payer: MEDICARE

## 2023-11-15 ENCOUNTER — TELEPHONE (OUTPATIENT)
Dept: UROLOGY | Facility: CLINIC | Age: 78
End: 2023-11-15
Payer: MEDICARE

## 2023-12-06 DIAGNOSIS — R06.83 SNORING: Primary | ICD-10-CM

## 2023-12-14 ASSESSMENT — SLEEP AND FATIGUE QUESTIONNAIRES
HOW LIKELY ARE YOU TO NOD OFF OR FALL ASLEEP WHEN YOU ARE A PASSENGER IN A CAR FOR AN HOUR WITHOUT A BREAK: SLIGHT CHANCE OF DOZING
HOW LIKELY ARE YOU TO NOD OFF OR FALL ASLEEP WHILE SITTING QUIETLY AFTER LUNCH WITHOUT ALCOHOL: WOULD NEVER DOZE
HOW LIKELY ARE YOU TO NOD OFF OR FALL ASLEEP WHILE SITTING AND TALKING TO SOMEONE: SLIGHT CHANCE OF DOZING
HOW LIKELY ARE YOU TO NOD OFF OR FALL ASLEEP WHILE LYING DOWN TO REST IN THE AFTERNOON WHEN CIRCUMSTANCES PERMIT: WOULD NEVER DOZE
HOW LIKELY ARE YOU TO NOD OFF OR FALL ASLEEP WHILE SITTING AND READING: WOULD NEVER DOZE
HOW LIKELY ARE YOU TO NOD OFF OR FALL ASLEEP WHILE WATCHING TV: SLIGHT CHANCE OF DOZING
HOW LIKELY ARE YOU TO NOD OFF OR FALL ASLEEP WHILE SITTING INACTIVE IN A PUBLIC PLACE: SLIGHT CHANCE OF DOZING
HOW LIKELY ARE YOU TO NOD OFF OR FALL ASLEEP IN A CAR, WHILE STOPPED FOR A FEW MINUTES IN TRAFFIC: WOULD NEVER DOZE

## 2023-12-21 ENCOUNTER — CARE COORDINATION (OUTPATIENT)
Dept: SLEEP MEDICINE | Facility: CLINIC | Age: 78
End: 2023-12-21

## 2023-12-21 ENCOUNTER — OFFICE VISIT (OUTPATIENT)
Dept: SLEEP MEDICINE | Facility: CLINIC | Age: 78
End: 2023-12-21
Attending: INTERNAL MEDICINE
Payer: MEDICARE

## 2023-12-21 VITALS
HEART RATE: 53 BPM | WEIGHT: 193.4 LBS | SYSTOLIC BLOOD PRESSURE: 117 MMHG | DIASTOLIC BLOOD PRESSURE: 70 MMHG | OXYGEN SATURATION: 97 % | HEIGHT: 69 IN | BODY MASS INDEX: 28.64 KG/M2

## 2023-12-21 DIAGNOSIS — G47.33 OSA (OBSTRUCTIVE SLEEP APNEA): ICD-10-CM

## 2023-12-21 PROCEDURE — 99204 OFFICE O/P NEW MOD 45 MIN: CPT | Performed by: INTERNAL MEDICINE

## 2023-12-21 RX ORDER — MELATONIN 10 MG
10 CAPSULE ORAL DAILY
COMMUNITY

## 2023-12-21 NOTE — NURSING NOTE
"Chief Complaint   Patient presents with    Sleep Problem     Establish care       Initial /70   Pulse 53   Ht 1.753 m (5' 9\")   Wt 87.7 kg (193 lb 6.4 oz)   SpO2 97%   BMI 28.56 kg/m   Estimated body mass index is 28.56 kg/m  as calculated from the following:    Height as of this encounter: 1.753 m (5' 9\").    Weight as of this encounter: 87.7 kg (193 lb 6.4 oz).    Medication Reconciliation: complete  ESS 4  Neck circumference:  43 centimeters.  Eliecer Horan MA       "

## 2023-12-21 NOTE — PROGRESS NOTES
Sleep Consultation:    Date on this visit: 12/21/2023    Zachariah Ram  is referred by Edu Reyes for a sleep consultation.     Primary Physician: Edu Reyes     Zachariah Ram presents to clinic for management of obstructive sleep apnea.  His medical history significant for paroxysmal atrial fibrillation, status post Watchman device placement, hypertension, hyperlipidemia, hypothyroidism, history of prostate cancer and history of TIA.    Patient reports that he was possibly diagnosed with obstructive sleep apnea approximately 20 years ago.  He has been on positive airway pressure therapy since then.  Most recent sleep studies that I could review are from [facility] sleep [place].  Split-night PSG on 1/10/2019 showed severe obstructive sleep apnea with an apnea-hypopnea index of 70.8/h.  CPAP titration was effective at a pressure of 8 cm H2O.  A split-night PSG report from 8/1/2023 was also reviewed.  This reported an apnea-hypopnea index of 72.3/h and RDI of 72.3/h.  Lowest oxygen saturation was 86%.    He is prescribed auto PAP therapy with pressure range of 11-16 cm H2O.     Overall, he rates the experience with PAP as good. The mask is comfortable. The mask is not leaking.  He is not snoring with the mask on. He is not having gasp arousals.  He is not having significant oral/nasal dryness. The pressure settings are comfortable.     He uses nasal mask.     He does feel rested in the morning.    ResMed     Auto-PAP 11-16 cmH2O download:  71/79 total days of use. 8 nonuse days. 86% days with >4 hours use.  Average use 6 hours 13 minutes per day. Median Leak 10.5 L/min. 95%ile Leak 28.3 L/min. CPAP 95% pressure 15cm. AHI 5.6 (central- 2.9, obstructive- 0.2)    Zachariah goes to bed at 8:30 PM and watches TV until 10:30 before falling asleep. He wakes up at 5:00 AM.    He wakes up 1-2 times a night to use the bathroom.  He has difficulty returning to sleep after waking up in the night.  He reports  having an overactive mind and worries about his wife's health, maintenance of his place in [place], his daughters marital problems etc.     He was previously on hypnotic medication.  He has consulted behavioral sleep medicine for cognitive behavioral therapy for insomnia in the past.    Zachariah denies any sleep walking, sleep talking, dream enactment, sleep paralysis, cataplexy, and hypnogogic/hypnopompic hallucinations.    Patient's Gilbert Sleepiness score 4/24 consistent with no daytime sleepiness.      Zachariah naps 0 times per week . He takes some inadvertant naps.  He denies closing eyes, dozing, and falling asleep while driving. Patient was counseled on the importance of driving while alert, to pull over if drowsy, or nap before getting into the vehicle if sleepy.      Problem List:  Patient Active Problem List    Diagnosis Date Noted    Polyp of colon 06/19/2023     Priority: Medium    Persistent sinus bradycardia 06/19/2023     Priority: Medium    Psychophysiological insomnia 06/19/2023     Priority: Medium    Dyslipidemia 06/19/2023     Priority: Medium    First degree atrioventricular block 06/19/2023     Priority: Medium    Benign prostatic hyperplasia 06/19/2023     Priority: Medium    History of transient ischemic attack 05/10/2023     Priority: Medium    Personal history of malignant neoplasm of prostate 05/10/2023     Priority: Medium    Abnormal echocardiography 05/10/2023     Priority: Medium    Brain TIA 03/19/2023     Priority: Medium    Overweight 02/14/2023     Priority: Medium    Acute right-sided low back pain with right-sided sciatica 11/04/2022     Priority: Medium    Lumbar radiculitis 12/14/2021     Priority: Medium     Added automatically from request for surgery [id number]      Lumbar disc herniation 12/14/2021     Priority: Medium     Added automatically from request for surgery [id number]      Glaucoma 07/16/2021     Priority: Medium    Left inguinal hernia 01/14/2021     Priority: Medium      Added automatically from request for surgery [id number]      Urinary frequency 01/05/2021     Priority: Medium    Malignant neoplasm of prostate s/p Davinci prostatectomy May 2020  (H) 02/21/2020     Priority: Medium    Atypical small acinar proliferation of prostate 12/26/2019     Priority: Medium    Abnormal results on imaging study of genitourinary system 12/26/2019     Priority: Medium    Osteopenia 07/08/2019     Priority: Medium    Nasal congestion 05/15/2019     Priority: Medium    Erectile dysfunction 05/15/2019     Priority: Medium    High prostate specific antigen (PSA) 11/13/2018     Priority: Medium     Formatting of this note might be different from the original.  Added automatically from request for surgery [id number]      Abnormality of left atrial appendage 08/28/2018     Priority: Medium    Long term (current) use of anticoagulants 07/27/2018     Priority: Medium     Formatting of this note might be different from the original.  For paroxymal atrial flutter/fibrillation      Seborrheic dermatitis of scalp 07/06/2018     Priority: Medium    BPH without urinary obstruction 07/06/2018     Priority: Medium    Smoking history      Priority: Medium     quit 1973      Paroxysmal atrial fibrillation (H)      Priority: Medium    Atrial flutter (H)      Priority: Medium    GERD (gastroesophageal reflux disease)      Priority: Medium    Hyperlipidemia      Priority: Medium    Obstructive sleep apnea      Priority: Medium    Hypertension      Priority: Medium    History of seborrheic dermatitis 01/01/2010     Priority: Medium    Hypothyroidism 06/01/2008     Priority: Medium    History of skin cancer 04/01/2008     Priority: Medium    Schatzki's ring 01/01/1995     Priority: Medium    MVP (mitral valve prolapse) 01/01/1985     Priority: Medium        Past Medical/Surgical History:  Past Medical History:   Diagnosis Date    Antiplatelet or antithrombotic long-term use     Arrhythmia     A FIB    Atrial flutter (H)   "   BPH (benign prostatic hyperplasia)     Cerebral infarction (H) 3/19/23    \"Possible\" Brain TIA, further tests 2023.    ED (erectile dysfunction)     Elevated prostate specific antigen (PSA)     GERD (gastroesophageal reflux disease)     Heart murmur     MVP    History of blood transfusion 1996 [place] [facility] surgery.    Repaired mitral valve.    History of skin cancer     Hyperlipidemia     Hypertension     Insomnia     Left inguinal hernia     Lentigo maligna melanoma (H)     MVP (mitral valve prolapse)     Nasal congestion     Obstructive sleep apnea     SEVERE    -  USES CPAP    Paroxysmal atrial fibrillation (H)     Schatzki's ring     Seborrheic dermatitis     Smoking history     quit 1973    Thyroid disease Partial thyroidectomy 2008, benign tumor.    Thyroid controlled by levothyroxine.    Urinary frequency      Social History     Tobacco Use    Smoking status: Never    Smokeless tobacco: Never   Vaping Use    Vaping Use: Never used   Substance Use Topics    Alcohol use: Yes     Comment: 3-4 ounces of venkat with water 2 per week.    Drug use: Never     Physical Examination:  Vitals: /70   Pulse 53   Ht 1.753 m (5' 9\")   Wt 87.7 kg (193 lb 6.4 oz)   SpO2 97%   BMI 28.56 kg/m    BMI= Body mass index is 28.56 kg/m .  GENERAL APPEARANCE: healthy, alert, and no distress  NEURO: mentation intact and speech normal  PSYCH: mentation appears normal and affect normal/bright      Impression/Plan:    Severe obstructive sleep apnea  Hypertension   Paroxysmal atrial fibrillation   Insomnia    Patient is currently prescribed auto titrating CPAP therapy with a pressure range of 11 to 16 cm H2O for treatment of previously detected severe obstructive sleep apnea.  Patient is using CPAP regularly and is benefiting from therapy.  He does have some problems with including mask leak, difficulty with expiratory pressure and sleep maintenance insomnia.  Reviewed download data from his device shows regular compliance " and satisfactory residual AHI at 5.6/h (predominantly reported as central apneas).  Per his request, I maximized expiratory pressure relief to 3.  I do not recommend other changes in pressure settings.  He will work with his DME for optimization of mask fitting.    Insomnia is perpetrated by condition hyperarousal and inadequate sleep hygiene practices like prolonged periods of time in bed watching TV.  We discussed optimization of sleep habits.    Plan:     Continue auto PAP therapy 11-16 cm H2O    Obstructive sleep apnea reviewed.  Complications of untreated sleep apnea were reviewed.    I spent a total of 50 minutes for this appointment on this date of service which include time spent before, during and after the visit for chart review, patient care, counseling and coordination of care.    Dr. Kendrick Luu       CC: Edu Reyes

## 2024-03-27 ENCOUNTER — DOCUMENTATION ONLY (OUTPATIENT)
Dept: SLEEP MEDICINE | Facility: CLINIC | Age: 79
End: 2024-03-27
Payer: MEDICARE

## 2024-03-27 NOTE — PROGRESS NOTES
Patient came to [facility]  for mask fitting appointment on March 26, 2024. Patient requested to switch masks because his current mask, the Mirage Activa LT, is discontinued. It also tends to dig into the bridge of his nose.. Discussed the following masks: Airfit P30i standard (patient previously has tried pillow masks and did not care for them), Airfit N30i standard, Eson 2 large. Patient has an implant that he is not 100% is safe to use with magnets so we only discussed masks that do not have magnets. Patient selected a [employer] mask name: Eson 2 nasal mask size large.

## 2024-04-09 ENCOUNTER — ALLIED HEALTH/NURSE VISIT (OUTPATIENT)
Dept: FAMILY MEDICINE | Facility: CLINIC | Age: 79
End: 2024-04-09
Payer: MEDICARE

## 2024-04-09 DIAGNOSIS — Z23 HIGH PRIORITY FOR 2019-NCOV VACCINE: Primary | ICD-10-CM

## 2024-04-09 PROCEDURE — 91320 SARSCV2 VAC 30MCG TRS-SUC IM: CPT

## 2024-04-09 PROCEDURE — 99207 PR NO CHARGE NURSE ONLY: CPT

## 2024-04-09 PROCEDURE — 90480 ADMN SARSCOV2 VAC 1/ONLY CMP: CPT

## 2024-04-15 ENCOUNTER — TELEPHONE (OUTPATIENT)
Dept: CARDIOLOGY | Facility: CLINIC | Age: 79
End: 2024-04-15
Payer: MEDICARE

## 2024-04-15 NOTE — TELEPHONE ENCOUNTER
[facility] Call Center    Phone Message    May a detailed message be left on voicemail: yes     Reason for Call: Other: Zachariah is a former patient of Dr. Benson and he also saw Nakita Gayle.  It has been over through years since he has been seen and would like to be contacted via [email or web address] in order to start a dialogue about his current medical state.  Please reach out to Zachariah via [email or web address] as he is unable to initiate contact himself.     Action Taken: Other: Cardiology    Travel Screening: Not Applicable    Thank you!  [facility]

## 2024-04-29 ENCOUNTER — PRE VISIT (OUTPATIENT)
Dept: UROLOGY | Facility: CLINIC | Age: 79
End: 2024-04-29
Payer: MEDICARE

## 2024-05-14 ENCOUNTER — TELEPHONE (OUTPATIENT)
Dept: UROLOGY | Facility: CLINIC | Age: 79
End: 2024-05-14

## 2024-05-14 ENCOUNTER — VIRTUAL VISIT (OUTPATIENT)
Dept: UROLOGY | Facility: CLINIC | Age: 79
End: 2024-05-14
Payer: MEDICARE

## 2024-05-14 DIAGNOSIS — N52.9 ERECTILE DYSFUNCTION, UNSPECIFIED ERECTILE DYSFUNCTION TYPE: ICD-10-CM

## 2024-05-14 DIAGNOSIS — Z85.46 PERSONAL HISTORY OF MALIGNANT NEOPLASM OF PROSTATE: Primary | ICD-10-CM

## 2024-05-14 PROCEDURE — 99213 OFFICE O/P EST LOW 20 MIN: CPT | Mod: 95 | Performed by: NURSE PRACTITIONER

## 2024-05-14 RX ORDER — TADALAFIL 5 MG/1
10 TABLET ORAL DAILY PRN
Qty: 30 TABLET | Refills: 6 | Status: SHIPPED | OUTPATIENT
Start: 2024-05-14

## 2024-05-14 ASSESSMENT — PAIN SCALES - GENERAL: PAINLEVEL: NO PAIN (0)

## 2024-05-14 NOTE — PROGRESS NOTES
Virtual Visit Details    Type of service:  Video Visit   Video Start Time: 9:03 AM  Video End Time: 9:24 AM    Originating Location (pt. Location): Home  Distant Location (provider location):  Off-site  Platform used for Video Visit: David    CC: History of prostate cancer; urinary incontinence; ED    Assessment/Plan:  79 year old male with a PMH of prostate cancer s/p RALP in 05/2020 with resulting ED. Has tried ICI, which has caused pain during injection and ejaculation. Using Cialis intermittently for the ED. Straining to completely empty his bladder but feels he is emptying completely. PSA to be checked with annual labs next month.   -Cialis refills sent.   -Follow up with me in one year to check on symptoms. Will plan for an in-person visit at that time to check a PVR, given his report of slow stream and straining to empty. He will let me know if this worsens in the meantime and we can arrange a sooner visit, if needed.     Arlen Abel, CNP  Department of Urology      Subjective:   79 year old male with PMH of prostate cancer s/p RALP in 05/2020 at [place]. Path showed [place] 4+3=7. All margins/nodes negative. PSA undetectable as of one year ago. Has had trouble with ED since surgery, which has not responded well to sildenafil (had side effects). He then tried Trimix (first #8, then #4) but had pain with injections. Has since been taking Cialis. Patient and his wife have engaged in oral sex rather than penetrative intercourse due to issues with weak erections. Has not been interested IPP. As he reported early loss of erections, suspected venous leak, and recommended he tried a penile constriction ring. Cialis refills sent.     Today, he states that symptoms are generally the same. Continues to have some leakage with sudden movements, such as swinging a golf club. ED remains about the same and he is comfortable with where this is. Continues to use the Cialis intermittently.     He feels like his stream has  slowed down and he down has to bear down to empty his bladder completely. He does feel like he is able to empty completely but it is taking more effort.     PSA due to be checked at the time of his upcoming well visit.     Objective:     Exam:   Patient is a 79 year old male   No vital signs obtained due to virtual visit.  General Appearance: Well groomed, hygenic  Respiratory: No cough, no respiratory distress or labored breathing  Musculoskeletal: Grossly normal with no gross deficits  Skin: No discoloration or apparent rashes  Neurologic: No tremors  Psychiatric: Alert and oriented  Further examination is deferred due to the nature of our visit.

## 2024-05-14 NOTE — LETTER
5/14/2024       RE: Zachariah Ram  [address]  [address]  [place] [address]     Dear Colleague,    Thank you for referring your patient, Zachariah Ram, to the [facility] [place] at [facility]. Please see a copy of my visit note below.    Virtual Visit Details    Type of service:  Video Visit   Video Start Time: 9:03 AM  Video End Time: 9:24 AM    Originating Location (pt. Location): Home  Distant Location (provider location):  Off-site  Platform used for Video Visit: David    CC: History of prostate cancer; urinary incontinence; ED    Assessment/Plan:  79 year old male with a PMH of prostate cancer s/p RALP in 05/2020 with resulting ED. Has tried ICI, which has caused pain during injection and ejaculation. Using Cialis intermittently for the ED. Straining to completely empty his bladder but feels he is emptying completely. PSA to be checked with annual labs next month.   -Cialis refills sent.   -Follow up with me in one year to check on symptoms. Will plan for an in-person visit at that time to check a PVR, given his report of slow stream and straining to empty. He will let me know if this worsens in the meantime and we can arrange a sooner visit, if needed.     Alren Abel, CNP  Department of Urology      Subjective:   79 year old male with PMH of prostate cancer s/p RALP in 05/2020 at [place]. Path showed [place] 4+3=7. All margins/nodes negative. PSA undetectable as of one year ago. Has had trouble with ED since surgery, which has not responded well to sildenafil (had side effects). He then tried Trimix (first #8, then #4) but had pain with injections. Has since been taking Cialis. Patient and his wife have engaged in oral sex rather than penetrative intercourse due to issues with weak erections. Has not been interested IPP. As he reported early loss of erections, suspected venous leak, and recommended he tried a penile constriction  ring. Cialis refills sent.     Today, he states that symptoms are generally the same. Continues to have some leakage with sudden movements, such as swinging a golf club. ED remains about the same and he is comfortable with where this is. Continues to use the Cialis intermittently.     He feels like his stream has slowed down and he down has to bear down to empty his bladder completely. He does feel like he is able to empty completely but it is taking more effort.     PSA due to be checked at the time of his upcoming well visit.     Objective:     Exam:   Patient is a 79 year old male   No vital signs obtained due to virtual visit.  General Appearance: Well groomed, hygenic  Respiratory: No cough, no respiratory distress or labored breathing  Musculoskeletal: Grossly normal with no gross deficits  Skin: No discoloration or apparent rashes  Neurologic: No tremors  Psychiatric: Alert and oriented  Further examination is deferred due to the nature of our visit.

## 2024-05-14 NOTE — TELEPHONE ENCOUNTER
Patient confirmed scheduled appointment:  Date: May 20th 2025  Time: 9:30am   Visit type: Return   Provider: Arlen Abel   Location: [facility]  Testing/imaging: N/A  Additional notes: Return in about 1 year (around 5/14/2025) for in person, with me. - per check out notes

## 2024-05-14 NOTE — PATIENT INSTRUCTIONS
UROLOGY CLINIC VISIT PATIENT INSTRUCTIONS    -Cialis refills sent.   -Follow up with me in one year for an in-person visit.     If you have any issues, questions or concerns in the meantime, do not hesitate to contact us at [phone] or via [employer].     Arlen Abel, CNP  Department of Urology

## 2024-05-14 NOTE — NURSING NOTE
Is the patient currently in the state of [place]? YES    Visit mode:VIDEO    If the visit is dropped, the patient can be reconnected by: VIDEO VISIT: Text to cell phone:   Telephone Information:   Mobile [phone]       Will anyone else be joining the visit? NO  (If patient encounters technical issues they should call [phone]    How would you like to obtain your AVS? MyChart    Are changes needed to the allergy or medication list? No    Are refills needed on medications prescribed by this physician? Pt unsure about tadalafil (CIALIS) 5 MG tablet medication    Reason for visit: RECHECK    Yoon FANGF

## 2024-05-26 DIAGNOSIS — E03.9 HYPOTHYROIDISM, UNSPECIFIED TYPE: ICD-10-CM

## 2024-05-26 DIAGNOSIS — E78.5 HYPERLIPIDEMIA, UNSPECIFIED HYPERLIPIDEMIA TYPE: ICD-10-CM

## 2024-05-28 RX ORDER — LEVOTHYROXINE SODIUM 100 UG/1
TABLET ORAL
Qty: 90 TABLET | Refills: 3 | OUTPATIENT
Start: 2024-05-28

## 2024-05-28 RX ORDER — ROSUVASTATIN CALCIUM 20 MG/1
20 TABLET, COATED ORAL DAILY
Qty: 90 TABLET | Refills: 0 | Status: SHIPPED | OUTPATIENT
Start: 2024-05-28 | End: 2024-08-07

## 2024-06-14 SDOH — HEALTH STABILITY: PHYSICAL HEALTH: ON AVERAGE, HOW MANY DAYS PER WEEK DO YOU ENGAGE IN MODERATE TO STRENUOUS EXERCISE (LIKE A BRISK WALK)?: 5 DAYS

## 2024-06-14 SDOH — HEALTH STABILITY: PHYSICAL HEALTH: ON AVERAGE, HOW MANY MINUTES DO YOU ENGAGE IN EXERCISE AT THIS LEVEL?: 30 MIN

## 2024-06-14 ASSESSMENT — SOCIAL DETERMINANTS OF HEALTH (SDOH): HOW OFTEN DO YOU GET TOGETHER WITH FRIENDS OR RELATIVES?: TWICE A WEEK

## 2024-06-19 ENCOUNTER — OFFICE VISIT (OUTPATIENT)
Dept: FAMILY MEDICINE | Facility: CLINIC | Age: 79
End: 2024-06-19
Attending: INTERNAL MEDICINE
Payer: MEDICARE

## 2024-06-19 VITALS
DIASTOLIC BLOOD PRESSURE: 67 MMHG | RESPIRATION RATE: 16 BRPM | BODY MASS INDEX: 27.11 KG/M2 | HEART RATE: 60 BPM | SYSTOLIC BLOOD PRESSURE: 120 MMHG | OXYGEN SATURATION: 96 % | TEMPERATURE: 97.7 F | HEIGHT: 69 IN | WEIGHT: 183 LBS

## 2024-06-19 DIAGNOSIS — Z00.00 ROUTINE HISTORY AND PHYSICAL EXAMINATION OF ADULT: Primary | ICD-10-CM

## 2024-06-19 DIAGNOSIS — Z78.9 MEDICALLY COMPLEX PATIENT: ICD-10-CM

## 2024-06-19 DIAGNOSIS — I48.0 PAROXYSMAL ATRIAL FIBRILLATION (H): ICD-10-CM

## 2024-06-19 DIAGNOSIS — Z00.00 ENCOUNTER FOR MEDICARE ANNUAL WELLNESS EXAM: ICD-10-CM

## 2024-06-19 DIAGNOSIS — M85.9 DISORDER OF BONE DENSITY AND STRUCTURE, UNSPECIFIED: ICD-10-CM

## 2024-06-19 DIAGNOSIS — E03.9 HYPOTHYROIDISM, UNSPECIFIED TYPE: ICD-10-CM

## 2024-06-19 DIAGNOSIS — Z13.21 ENCOUNTER FOR VITAMIN DEFICIENCY SCREENING: ICD-10-CM

## 2024-06-19 DIAGNOSIS — K21.9 GASTROESOPHAGEAL REFLUX DISEASE, UNSPECIFIED WHETHER ESOPHAGITIS PRESENT: ICD-10-CM

## 2024-06-19 DIAGNOSIS — I10 BENIGN ESSENTIAL HYPERTENSION: ICD-10-CM

## 2024-06-19 DIAGNOSIS — I48.92 ATRIAL FLUTTER, UNSPECIFIED TYPE (H): ICD-10-CM

## 2024-06-19 DIAGNOSIS — M85.88 OTHER SPECIFIED DISORDERS OF BONE DENSITY AND STRUCTURE, OTHER SITE: ICD-10-CM

## 2024-06-19 DIAGNOSIS — C61 MALIGNANT NEOPLASM OF PROSTATE (H): ICD-10-CM

## 2024-06-19 DIAGNOSIS — M85.80 OSTEOPENIA, UNSPECIFIED LOCATION: ICD-10-CM

## 2024-06-19 DIAGNOSIS — E78.5 HYPERLIPIDEMIA, UNSPECIFIED HYPERLIPIDEMIA TYPE: ICD-10-CM

## 2024-06-19 LAB
ALBUMIN SERPL BCG-MCNC: 4 G/DL (ref 3.5–5.2)
ALP SERPL-CCNC: 59 U/L (ref 40–150)
ALT SERPL W P-5'-P-CCNC: 23 U/L (ref 0–70)
ANION GAP SERPL CALCULATED.3IONS-SCNC: 10 MMOL/L (ref 7–15)
AST SERPL W P-5'-P-CCNC: 25 U/L (ref 0–45)
BASOPHILS # BLD AUTO: 0 10E3/UL (ref 0–0.2)
BASOPHILS NFR BLD AUTO: 0 %
BILIRUB SERPL-MCNC: 0.6 MG/DL
BUN SERPL-MCNC: 22.6 MG/DL (ref 8–23)
CALCIUM SERPL-MCNC: 9.3 MG/DL (ref 8.8–10.2)
CHLORIDE SERPL-SCNC: 103 MMOL/L (ref 98–107)
CHOLEST SERPL-MCNC: 110 MG/DL
CREAT SERPL-MCNC: 1.24 MG/DL (ref 0.67–1.17)
CREAT UR-MCNC: 102 MG/DL
DEPRECATED HCO3 PLAS-SCNC: 26 MMOL/L (ref 22–29)
EGFRCR SERPLBLD CKD-EPI 2021: 59 ML/MIN/1.73M2
EOSINOPHIL # BLD AUTO: 0.3 10E3/UL (ref 0–0.7)
EOSINOPHIL NFR BLD AUTO: 5 %
ERYTHROCYTE [DISTWIDTH] IN BLOOD BY AUTOMATED COUNT: 12.1 % (ref 10–15)
FASTING STATUS PATIENT QL REPORTED: NO
FASTING STATUS PATIENT QL REPORTED: NO
GLUCOSE SERPL-MCNC: 64 MG/DL (ref 70–99)
HCT VFR BLD AUTO: 42.8 % (ref 40–53)
HDLC SERPL-MCNC: 56 MG/DL
HGB BLD-MCNC: 13.8 G/DL (ref 13.3–17.7)
IMM GRANULOCYTES # BLD: 0 10E3/UL
IMM GRANULOCYTES NFR BLD: 0 %
LDLC SERPL CALC-MCNC: 35 MG/DL
LYMPHOCYTES # BLD AUTO: 1.4 10E3/UL (ref 0.8–5.3)
LYMPHOCYTES NFR BLD AUTO: 22 %
MCH RBC QN AUTO: 30.3 PG (ref 26.5–33)
MCHC RBC AUTO-ENTMCNC: 32.2 G/DL (ref 31.5–36.5)
MCV RBC AUTO: 94 FL (ref 78–100)
MICROALBUMIN UR-MCNC: <12 MG/L
MICROALBUMIN/CREAT UR: NORMAL MG/G{CREAT}
MONOCYTES # BLD AUTO: 0.7 10E3/UL (ref 0–1.3)
MONOCYTES NFR BLD AUTO: 11 %
NEUTROPHILS # BLD AUTO: 4 10E3/UL (ref 1.6–8.3)
NEUTROPHILS NFR BLD AUTO: 62 %
NONHDLC SERPL-MCNC: 54 MG/DL
PLATELET # BLD AUTO: 194 10E3/UL (ref 150–450)
POTASSIUM SERPL-SCNC: 4.6 MMOL/L (ref 3.4–5.3)
PROT SERPL-MCNC: 6.8 G/DL (ref 6.4–8.3)
PSA SERPL DL<=0.01 NG/ML-MCNC: <0.01 NG/ML (ref 0–6.5)
RBC # BLD AUTO: 4.55 10E6/UL (ref 4.4–5.9)
SODIUM SERPL-SCNC: 139 MMOL/L (ref 135–145)
TRIGL SERPL-MCNC: 94 MG/DL
TSH SERPL DL<=0.005 MIU/L-ACNC: 1.25 UIU/ML (ref 0.3–4.2)
VIT B12 SERPL-MCNC: 1020 PG/ML (ref 232–1245)
VIT D+METAB SERPL-MCNC: 58 NG/ML (ref 20–50)
WBC # BLD AUTO: 6.4 10E3/UL (ref 4–11)

## 2024-06-19 PROCEDURE — 82306 VITAMIN D 25 HYDROXY: CPT | Performed by: INTERNAL MEDICINE

## 2024-06-19 PROCEDURE — 84153 ASSAY OF PSA TOTAL: CPT | Performed by: INTERNAL MEDICINE

## 2024-06-19 PROCEDURE — 82607 VITAMIN B-12: CPT | Performed by: INTERNAL MEDICINE

## 2024-06-19 PROCEDURE — 80053 COMPREHEN METABOLIC PANEL: CPT | Performed by: INTERNAL MEDICINE

## 2024-06-19 PROCEDURE — 93000 ELECTROCARDIOGRAM COMPLETE: CPT | Performed by: INTERNAL MEDICINE

## 2024-06-19 PROCEDURE — 85025 COMPLETE CBC W/AUTO DIFF WBC: CPT | Performed by: INTERNAL MEDICINE

## 2024-06-19 PROCEDURE — 99214 OFFICE O/P EST MOD 30 MIN: CPT | Mod: 25 | Performed by: INTERNAL MEDICINE

## 2024-06-19 PROCEDURE — 82570 ASSAY OF URINE CREATININE: CPT | Performed by: INTERNAL MEDICINE

## 2024-06-19 PROCEDURE — 83036 HEMOGLOBIN GLYCOSYLATED A1C: CPT | Mod: 95 | Performed by: INTERNAL MEDICINE

## 2024-06-19 PROCEDURE — 36415 COLL VENOUS BLD VENIPUNCTURE: CPT | Performed by: INTERNAL MEDICINE

## 2024-06-19 PROCEDURE — 84443 ASSAY THYROID STIM HORMONE: CPT | Performed by: INTERNAL MEDICINE

## 2024-06-19 PROCEDURE — 80061 LIPID PANEL: CPT | Performed by: INTERNAL MEDICINE

## 2024-06-19 PROCEDURE — 82043 UR ALBUMIN QUANTITATIVE: CPT | Performed by: INTERNAL MEDICINE

## 2024-06-19 PROCEDURE — G0439 PPPS, SUBSEQ VISIT: HCPCS | Performed by: INTERNAL MEDICINE

## 2024-06-19 ASSESSMENT — PAIN SCALES - GENERAL: PAINLEVEL: NO PAIN (0)

## 2024-06-19 NOTE — PROGRESS NOTES
Preventive Care Visit  [facility]  Edu Reyes MD, Internal Medicine  Jun 19, 2024      Assessment & Plan   Problem List Items Addressed This Visit       Paroxysmal atrial fibrillation (H)    Relevant Orders    Comprehensive metabolic panel (BMP + Alb, Alk Phos, ALT, AST, Total. Bili, TP)    CBC with platelets and differential    EKG 12-lead complete w/read - Clinics (Completed)    Atrial flutter (H)    GERD (gastroesophageal reflux disease)    Hyperlipidemia    Relevant Orders    Lipid panel reflex to direct LDL Non-fasting    Osteopenia    Relevant Orders    DX Bone Density    Hypothyroidism    Relevant Orders    TSH WITH FREE T4 REFLEX    Malignant neoplasm of prostate s/p Davinci prostatectomy May 2020  (H)    Relevant Orders    PSA, tumor marker     Other Visit Diagnoses       Routine history and physical examination of adult    -  Primary    Encounter for Medicare annual wellness exam        Benign essential hypertension        Relevant Orders    Albumin Random Urine Quantitative with Creat Ratio    Medically complex patient        Relevant Orders    Med Therapy Management Referral    Other specified disorders of bone density and structure, other site        Relevant Orders    DX Bone Density    Encounter for vitamin deficiency screening        Relevant Orders    Vitamin B12    Vitamin D Deficiency    Disorder of bone density and structure, unspecified        Relevant Orders    Vitamin D Deficiency             Patient has been advised of split billing requirements and indicates understanding: Yes  He elected not to pursue the bone densitometry at this time.  Last study showed osteopenia.  Discussed on weightbearing exercise reports 1 walking he does carry 3 pound weights while walking.  He does do quite a bit of exercise, no chest pain with exertion.  Continue follow-up with cardiology at [place] a year.  [1 more time]  Follows with urology.  PSA tumor marker.  Repeat labs today thyroid  "cholesterol chemistry CBC.  EKG baseline.  Check QT.  Avoid using Benadryl as possible can cause confusion one of the side effects.  Keep well-hydrated at all times.  He deferred doing EGD at this time is managing the intermittent dysphagia advised on risk of aspiration.  Had extensive workup last year with speech therapy.  Hemodynamics are stable, blood pressure well-controlled.  Heart rate can dip down in the high 40s when at rest.  When standing heart rate in the 60s.  He is on flecainide for rate control.  He continues on anticoagulation and aspirin.  He was advised to stop aspirin 6 weeks after WATCHMAN'S device. advised just be careful with any bleeding .  He will follow-up with GI regarding need for any future colonoscopy last was in 2015 was normal.  BMI  Estimated body mass index is 27.02 kg/m  as calculated from the following:    Height as of this encounter: 1.753 m (5' 9\").    Weight as of this encounter: 83 kg (183 lb).   Weight management plan: Discussed healthy diet and exercise guidelines    Counseling  Appropriate preventive services were discussed with this patient, including applicable screening as appropriate for fall prevention, nutrition, physical activity, Tobacco-use cessation, weight loss and cognition.  Checklist reviewing preventive services available has been given to the patient.  Reviewed patient's diet, addressing concerns and/or questions.   The patient was provided with written information regarding signs of hearing loss.   Information on urinary incontinence and treatment options given to patient.     Work on weight loss  Regular exercise  See Patient Instructions      Monica Soni is a 79 year old, presenting for the following:  Physical and LAB REQUEST        6/19/2023     8:31 AM   Additional Questions   Roomed by Antoinette BARRAGAN CMA         Health Care Directive  Patient does not have a Health Care Directive or Living Will: Patient states has Advance Directive and will bring in " "a copy to clinic.    ASHLEY Diego is presenting for follow-up today.  He had a Watchman device put in [facility].  He is still on anticoagulation and baby aspirin.  He had a mini stroke also back in [place] with some blurriness and tilting to the right side.  Workup has been nonrevealing.  He has history of atrial fibrillation on chronic anticoagulation.  No chest pain at rest or with exertion, blood pressure hemodynamics are stable,  Is up-to-date on his vaccines.  Colonoscopy last in 2015 showed no polyps.  Was advised repeat in 5 years  He uses Cialis as needed follows with urology, has history of prostate surgery for prostate cancer.  Voiding has been slightly weak.  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  Sees ophthalmology twice a year due glaucoma.  Mood is good.  Sleep is good on the CPAP adequate 6 hours per shift.  Has ED uses sparingly Cialis.   minor swelling in the legs.    Use ketoconazole shampoo on the head for seborrheic dermatitis.  Sees dermatology twice a year.  Had melanoma in the past on the left sideburn.  Had swallowing evaluation last year with speech therapy.  Had EGD in the past of dilated SCHATZKI'S RING.  Currently swallowing is going well.  His only current food is eating.  No choking history.  Even food gets stuck \"he can still breathe\"    This was urology      6/14/2024   General Health   How would you rate your overall physical health? Good   Feel stress (tense, anxious, or unable to sleep) Rather much      (!) STRESS CONCERN      6/14/2024   Nutrition   Diet: Regular (no restrictions)            6/14/2024   Exercise   Days per week of moderate/strenous exercise 5 days   Average minutes spent exercising at this level 30 min            6/14/2024   Social Factors   Frequency of gathering with friends or relatives Twice a week   Worry " food won't last until get money to buy more No   Food not last or not have enough money for food? No   Do you have housing? (Housing is defined as stable permanent housing and does not include staying ouside in a car, in a tent, in an abandoned building, in an overnight shelter, or couch-surfing.) Yes   Are you worried about losing your housing? No   Lack of transportation? No   Unable to get utilities (heat,electricity)? No            6/14/2024   Fall Risk   Fallen 2 or more times in the past year? No   Trouble with walking or balance? No             6/14/2024   Activities of Daily Living- Home Safety   Needs help with the following daily activites None of the above   Safety concerns in the home None of the above            6/14/2024   Dental   Dentist two times every year? Yes            6/14/2024   Hearing Screening   Hearing concerns? (!) IT'S HARD TO FOLLOW A CONVERSATION IN A NOISY RESTAURANT OR CROWDED ROOM.    (!) TROUBLE UNDESTANDING A SPEAKER IN A PUBLIC MEETING OR [employer] SERVICE.       Multiple values from one day are sorted in reverse-chronological order         6/14/2024   Driving Risk Screening   Patient/family members have concerns about driving No            6/14/2024   General Alertness/Fatigue Screening   Have you been more tired than usual lately? No            6/14/2024   Urinary Incontinence Screening   Bothered by leaking urine in past 6 months Yes            6/14/2024   TB Screening   Were you born outside of the [place]? No            Today's PHQ-2 Score:       6/18/2024     9:41 AM   PHQ-2 ( 1999 [employer])   Q1: Little interest or pleasure in doing things 0   Q2: Feeling down, depressed or hopeless 0   PHQ-2 Score 0   Q1: Little interest or pleasure in doing things Not at all   Q2: Feeling down, depressed or hopeless Not at all   PHQ-2 Score 0           6/14/2024   Substance Use   Alcohol more than 3/day or more than 7/wk No   Do you have a current opioid prescription? No   How severe/bad is  "pain from 1 to 10? /10   Do you use any other substances recreationally? (!) ALCOHOL    (!) OTHER       Multiple values from one day are sorted in reverse-chronological order     Social History     Tobacco Use    Smoking status: Never    Smokeless tobacco: Never   Vaping Use    Vaping status: Never Used   Substance Use Topics    Alcohol use: Yes     Comment: 3-4 ounces of venkat with water 2 per week.    Drug use: Never       ASCVD Risk   The ASCVD Risk score (Miguel JACINTO, et al., 2019) failed to calculate for the following reasons:    The patient has a prior MI or stroke diagnosis    Fracture Risk Assessment Tool  Link to Frax Calculator  Use the information below to complete the Frax calculator  : 1945  Sex: male  Weight (kg): 83 kg (actual weight)  Height (cm): 175.3 cm  Previous Fragility Fracture:  No  History of parent with fractured hip:  No  Current Smoking:  No  Patient has been on glucocorticoids for more than 3 months (5mg/day or more): No  Rheumatoid Arthritis on Problem List:  No  Secondary Osteoporosis on Problem List:  No  Consumes 3 or more units of alcohol per day: No  Femoral Neck BMD (g/cm2)            Reviewed and updated as needed this visit by Provider                    Past Medical History:   Diagnosis Date    Antiplatelet or antithrombotic long-term use     Arrhythmia     A FIB    Atrial flutter (H)     BPH (benign prostatic hyperplasia)     Cerebral infarction (H) 3/19/23    \"Possible\" Brain TIA, further tests .    ED (erectile dysfunction)     Elevated prostate specific antigen (PSA)     GERD (gastroesophageal reflux disease)     Heart murmur     MVP    History of blood transfusion  [place] open heart surgery.    Repaired mitral valve.    History of skin cancer     Hyperlipidemia     Hypertension     Insomnia     Left inguinal hernia     Lentigo maligna melanoma (H)     MVP (mitral valve prolapse)     Nasal congestion     Obstructive sleep apnea     SEVERE    -  USES " CPAP    Paroxysmal atrial fibrillation (H)     Schatzki's ring     Seborrheic dermatitis     Smoking history     quit 1973    Thyroid disease Partial thyroidectomy 2008, benign tumor.    Thyroid controlled by levothyroxine.    Urinary frequency      Past Surgical History:   Procedure Laterality Date    ABDOMEN SURGERY  Hernia right side 1994.  Prostatectomy [place] 5/20    Normal side effects.    BIOPSY  For various skin cancers, prostate.    Treatment completed at the time.    CARDIOVERSION  08/07/2017    COLONOSCOPY  2015     at [facility] ([facility], [place], [place])    EGD, GASTROESOPHAGEAL REFLUX TEST WITH MUCOSAL PH ELECTRODE      ENT SURGERY  Partial parotidectomy 2004    Benign tumor.    GENITOURINARY SURGERY  Prostatectomy [place] 5/20.    Usual side effects.    HC KNEE SCOPE,MED/LAT MENISCUS REPAIR Left 2016    HERNIA REPAIR  1994    HERNIORRHAPHY INGUINAL Left 01/27/2021    Procedure: OPEN LEFT INGUINAL HERNIA REPAIR;  Surgeon: Bernard Caballero MD;  Location: [facility] OR    LASIK      Left atrial appendage ligation  1996    MR PROSTATE  W/O & W CONTRAST  2016, 2018    PAROTIDECTOMY  2004    partial parotidectomy    PROSTATECTOMY, RETROPUBIC, RADICAL, ROBOT-ASSISTED, LAP, USING DA LEONEL SI, W/PELVIC LYMPHADENECTOMY  05/13/2020    s/p RALP in 05/2020 at [place]. Path showed prostate cancer [place] 4+3=7    REPAIR VALVE MITRAL  1996    SKIN CANCER SCREENING      Skin cancer surgeries-- basal on ear 2008, melanoma on left sideburn 2011, carcinoma right arm 2013    THYROIDECTOMY  2008    partial thyroidectomy     Lab work is in process  Labs reviewed in EPIC  BP Readings from Last 3 Encounters:   06/19/24 120/67   12/21/23 117/70   09/25/23 128/65    Wt Readings from Last 3 Encounters:   06/19/24 83 kg (183 lb)   12/21/23 87.7 kg (193 lb 6.4 oz)   09/25/23 87.5 kg (193 lb)                  Patient Active Problem List   Diagnosis    Smoking history    Paroxysmal atrial fibrillation (H)    Atrial flutter  (H)    GERD (gastroesophageal reflux disease)    Hyperlipidemia    Obstructive sleep apnea    Hypertension    Left inguinal hernia    Lumbar radiculitis    Lumbar disc herniation    Acute right-sided low back pain with right-sided sciatica    Osteopenia    Urinary frequency    Seborrheic dermatitis of scalp    Schatzki's ring    Polyp of colon    Persistent sinus bradycardia    Overweight    Nasal congestion    MVP (mitral valve prolapse)    Psychophysiological insomnia    Hypothyroidism    History of transient ischemic attack    Personal history of malignant neoplasm of prostate    History of skin cancer    History of seborrheic dermatitis    High prostate specific antigen (PSA)    Glaucoma    Long term (current) use of anticoagulants    Dyslipidemia    Erectile dysfunction    First degree atrioventricular block    Brain TIA    Benign prostatic hyperplasia    BPH without urinary obstruction    Atypical small acinar proliferation of prostate    Abnormality of left atrial appendage    Abnormal results on imaging study of genitourinary system    Malignant neoplasm of prostate s/p Davinci prostatectomy May 2020  (H)    Abnormal echocardiography     Past Surgical History:   Procedure Laterality Date    ABDOMEN SURGERY  Hernia right side 1994.  Prostatectomy [place] 5/20    Normal side effects.    BIOPSY  For various skin cancers, prostate.    Treatment completed at the time.    CARDIOVERSION  08/07/2017    COLONOSCOPY  2015     at [facility] ([employer], [place], [place])    EGD, GASTROESOPHAGEAL REFLUX TEST WITH MUCOSAL PH ELECTRODE      ENT SURGERY  Partial parotidectomy 2004    Benign tumor.    GENITOURINARY SURGERY  Prostatectomy [place] 5/20.    Usual side effects.    HC KNEE SCOPE,MED/LAT MENISCUS REPAIR Left 2016    HERNIA REPAIR  1994    HERNIORRHAPHY INGUINAL Left 01/27/2021    Procedure: OPEN LEFT INGUINAL HERNIA REPAIR;  Surgeon: Bernard Caballero MD;  Location:  OR    [facility]      Left atrial  appendage ligation      MR PROSTATE  W/O & W CONTRAST  ,     PAROTIDECTOMY  2004    partial parotidectomy    PROSTATECTOMY, RETROPUBIC, RADICAL, ROBOT-ASSISTED, LAP, USING DA LEONEL SI, W/PELVIC LYMPHADENECTOMY  2020    s/p RALP in 2020 at [facility]. Path showed prostate cancer [place] 4+3=7    REPAIR VALVE MITRAL      SKIN CANCER SCREENING      Skin cancer surgeries-- basal on ear , melanoma on left sideburn , carcinoma right arm     THYROIDECTOMY      partial thyroidectomy       Social History     Tobacco Use    Smoking status: Never    Smokeless tobacco: Never   Substance Use Topics    Alcohol use: Yes     Comment: 3-4 ounces of venkat with water 2 per week.     Family History   Problem Relation Age of Onset    Coronary Artery Disease Early Onset Father     Coronary Artery Disease Father         Problems age 50,  age 66.    Diabetes Brother         Since est age 50, overweight then.    Coronary Artery Disease Brother         AFib,etc.  [place] pace maker/defibrulator    Diabetes Maternal Grandfather     Coronary Artery Disease Brother         Mitral valve repaire age 60    Other Cancer Sister          age 40,     Other Cancer Paternal Grandfather         Cancer of the mouth,  age 66.    Cerebrovascular Disease Paternal Grandfather         Age 66, passed 10 days    Hypertension Paternal Grandmother          10 days after a paralyzing stroke at age 66         Current Outpatient Medications   Medication Sig Dispense Refill    acetaminophen (TYLENOL) 500 MG tablet Take 500 mg by mouth as needed      amoxicillin (AMOXIL) 500 MG capsule TAKE 4 CAPSULES BY MOUTH 30 TO 60 MINS PRIOR TO DENTAL  PROCEDURE 12 capsule 1    apixaban ANTICOAGULANT (ELIQUIS ANTICOAGULANT) 5 MG tablet Take 1 tablet (5 mg) by mouth 2 times daily 180 tablet 3    Ascorbic Acid (VITAMIN C) 500 MG CAPS Take 500 mg by mouth daily 500 mg in Am and 1,000 mg in PM      aspirin 81 MG EC tablet Take 81 mg  by mouth daily      calcium citrate-vitamin D (CITRACAL) 315-200 MG-UNIT TABS per tablet 2 tablets daily       cholecalciferol (VITAMIN D3) 25 mcg (1000 units) capsule Take 2 capsules by mouth daily      diphenhydrAMINE (BENADRYL) 25 MG tablet Take 25 mg by mouth At Bedtime      flecainide (TAMBOCOR) 100 MG tablet Take 1 tablet (100 mg) by mouth 2 times daily 180 tablet 3    hydrocortisone (CORTAID) 0.5 % external cream Apply topically daily as needed      ketoconazole (NIZORAL) 2 % external cream Apply topically daily as needed for itching 60 g 3    ketoconazole (NIZORAL) 2 % external shampoo APPLY TO SCALP, LATHER, LEAVE IN PLACE FOR 5 MINUTES, THEN RINSE OFF. USE ONCE EVERY 3 TO 4 DAYS UP TO 8 WEEKS, THEN AS NEEDED AS DIRECTED 360 mL 0    latanoprost (XALATAN) 0.005 % ophthalmic solution Place 1 drop into both eyes daily      levothyroxine (SYNTHROID/LEVOTHROID) 100 MCG tablet TAKE 1 TABLET BY MOUTH DAILY 90 tablet 3    losartan (COZAAR) 50 MG tablet Take 1 tablet (50 mg) by mouth 2 times daily 180 tablet 3    magnesium 200 MG TABS Take 250 mg by mouth daily      Melatonin 10 MG CAPS Take 10 mg by mouth daily      Misc Natural Products (GLUCOSAMINE CHOND MSM FORMULA PO) Take 1 tablet by mouth daily       Multiple Vitamins-Minerals (VITRUM 50+ SENIOR MULTI) TABS Take by mouth daily      omeprazole (PRILOSEC) 20 MG DR capsule TAKE 1 CAPSULE BY MOUTH DAILY 90 capsule 3    Potassium Gluconate 595 MG CAPS Take 595 mg by mouth daily      rosuvastatin (CRESTOR) 20 MG tablet TAKE 1 TABLET BY MOUTH DAILY 90 tablet 0    tadalafil (CIALIS) 5 MG tablet Take 2 tablets (10 mg) by mouth daily as needed (Erectile dysfunction) 30 tablet 6    tretinoin (RETIN-A) 0.05 % external cream Apply topically At Bedtime       Allergies   Allergen Reactions    Amlodipine Swelling     Noted at 5 mg    Poison Ivy Extract Itching    Lisinopril Cough     Recent Labs   Lab Test 06/19/23  1004 03/20/23  0518 09/09/22  1138 05/27/22  1150  11/10/21  1335 06/15/21  1433 01/14/19  1403 06/29/17  1025   LDL  --   --  37 101*  --   --   --  130*   HDL  --   --  62 63  --   --   --  63   TRIG  --  111 107 130  --   --   --  83   ALT  --   --   --  26 23  --   --   --    CR  --   --   --  0.99 1.12  --  1.16 1.15   GFRESTIMATED  --   --   --  78 63  --  62  --    GFRESTBLACK  --   --   --   --   --   --  75  --    POTASSIUM  --   --   --  4.7 4.7  --  4.2  --    TSH 1.82  --   --  2.87  --    < >  --   --     < > = values in this interval not displayed.      Current providers sharing in care for this patient include:  Patient Care Team:  Edu Reyes MD as PCP - General (Internal Medicine)  Juan Cornelius MD, MD as MD (Urology)  Tommy Sanchez MD as MD (Urology)  Katja Godoy MD as MD (Neurology)  Arlen Abel CNP as Assigned Surgical Provider  Katja Godoy MD as Assigned Neuroscience Provider  Brian Moody MD as Assigned PCP  Kendrick Luu MD as Assigned Sleep Provider    The following health maintenance items are reviewed in Epic and correct as of today:  Health Maintenance   Topic Date Due    LIPID  03/20/2024    MEDICARE ANNUAL WELLNESS VISIT  06/19/2024    TSH W/FREE T4 REFLEX  06/19/2024    ANNUAL REVIEW OF HM ORDERS  06/19/2025    FALL RISK ASSESSMENT  06/19/2025    GLUCOSE  03/20/2026    ADVANCE CARE PLANNING  06/19/2029    DTAP/TDAP/TD IMMUNIZATION (3 - Td or Tdap) 08/13/2030    HEPATITIS C SCREENING  Completed    PHQ-2 (once per calendar year)  Completed    INFLUENZA VACCINE  Completed    Pneumococcal Vaccine: 65+ Years  Completed    ZOSTER IMMUNIZATION  Completed    RSV VACCINE (Pregnancy & 60+)  Completed    COVID-19 Vaccine  Completed    IPV IMMUNIZATION  Aged Out    HPV IMMUNIZATION  Aged Out    MENINGITIS IMMUNIZATION  Aged Out    RSV MONOCLONAL ANTIBODY  Aged Out    COLORECTAL CANCER SCREENING  Discontinued         Review of Systems  Constitutional, neuro, ENT, endocrine, pulmonary,  "cardiac, gastrointestinal, genitourinary, musculoskeletal, integument and psychiatric systems are negative, except as otherwise noted.     Objective    Exam  /67 (BP Location: Left arm, Patient Position: Sitting, Cuff Size: Adult Regular)   Pulse 60   Temp 97.7  F (36.5  C) (Temporal)   Resp 16   Ht 1.753 m (5' 9\")   Wt 83 kg (183 lb)   SpO2 96%   BMI 27.02 kg/m     Estimated body mass index is 27.02 kg/m  as calculated from the following:    Height as of this encounter: 1.753 m (5' 9\").    Weight as of this encounter: 83 kg (183 lb).    Physical Exam  GENERAL: alert and no distress  EYES: Eyes grossly normal to inspection, PERRL and conjunctivae and sclerae normal  HENT: ear canals and TM's normal, nose and mouth without ulcers or lesions  NECK: no adenopathy, no asymmetry, masses, or scars  RESP: lungs clear to auscultation - no rales, rhonchi or wheezes  Chest: Midsternal scar old.  CV: regular rate and rhythm, normal S1 S2, no S3 or S4, no murmur, click or rub, no peripheral edema  ABDOMEN: soft, nontender, no hepatosplenomegaly, no masses and bowel sounds normal  MS: no gross musculoskeletal defects noted, no edema  SKIN: no suspicious lesions or rashes  NEURO: Normal strength and tone, mentation intact and speech normal  PSYCH: mentation appears normal, affect normal/bright         6/19/2024   Mini Cog   Clock Draw Score 2 Normal   3 Item Recall 2 objects recalled   Mini Cog Total Score 4                 Signed Electronically by: Edu Reyes MD    "

## 2024-06-21 DIAGNOSIS — E78.5 HYPERLIPIDEMIA, UNSPECIFIED HYPERLIPIDEMIA TYPE: ICD-10-CM

## 2024-06-21 DIAGNOSIS — E03.9 HYPOTHYROIDISM, UNSPECIFIED TYPE: ICD-10-CM

## 2024-06-21 DIAGNOSIS — Z29.89 NEED FOR SBE (SUBACUTE BACTERIAL ENDOCARDITIS) PROPHYLAXIS: ICD-10-CM

## 2024-06-21 DIAGNOSIS — I10 BENIGN ESSENTIAL HYPERTENSION: ICD-10-CM

## 2024-06-21 RX ORDER — LOSARTAN POTASSIUM 50 MG/1
50 TABLET ORAL 2 TIMES DAILY
Qty: 180 TABLET | Refills: 0 | Status: SHIPPED | OUTPATIENT
Start: 2024-06-21

## 2024-06-21 RX ORDER — LOSARTAN POTASSIUM 50 MG/1
50 TABLET ORAL 2 TIMES DAILY
Qty: 180 TABLET | Refills: 3 | OUTPATIENT
Start: 2024-06-21

## 2024-06-21 RX ORDER — ROSUVASTATIN CALCIUM 20 MG/1
20 TABLET, COATED ORAL DAILY
Qty: 90 TABLET | Refills: 3 | OUTPATIENT
Start: 2024-06-21

## 2024-06-21 RX ORDER — LEVOTHYROXINE SODIUM 100 UG/1
TABLET ORAL
Qty: 90 TABLET | Refills: 3 | Status: SHIPPED | OUTPATIENT
Start: 2024-06-21

## 2024-06-22 RX ORDER — AMOXICILLIN 500 MG/1
CAPSULE ORAL
Qty: 12 CAPSULE | Refills: 1 | Status: SHIPPED | OUTPATIENT
Start: 2024-06-22

## 2024-06-24 ENCOUNTER — VIRTUAL VISIT (OUTPATIENT)
Dept: FAMILY MEDICINE | Facility: CLINIC | Age: 79
End: 2024-06-24
Payer: MEDICARE

## 2024-06-24 DIAGNOSIS — Z13.1 SCREENING FOR DIABETES MELLITUS: Primary | ICD-10-CM

## 2024-06-24 DIAGNOSIS — N17.9 AKI (ACUTE KIDNEY INJURY) (H): ICD-10-CM

## 2024-06-24 DIAGNOSIS — E16.2 LOW BLOOD SUGAR: ICD-10-CM

## 2024-06-24 DIAGNOSIS — R00.1 BRADYCARDIA: ICD-10-CM

## 2024-06-24 LAB — HBA1C MFR BLD: 5.6 % (ref 0–5.6)

## 2024-06-24 PROCEDURE — 99213 OFFICE O/P EST LOW 20 MIN: CPT | Mod: 95 | Performed by: INTERNAL MEDICINE

## 2024-06-24 NOTE — PROGRESS NOTES
Zachariah is a 79 year old who is being evaluated via a billable video visit.    How would you like to obtain your AVS? MyChart  If the video visit is dropped, the invitation should be resent by: Text to cell phone: [phone]  Will anyone else be joining your video visit? No      Assessment & Plan   Problem List Items Addressed This Visit    None  Visit Diagnoses       Screening for diabetes mellitus    -  Primary    Relevant Orders    Hemoglobin A1c (Completed)    Low blood sugar        Bradycardia        RAY (acute kidney injury) (H24)        Relevant Orders    Basic metabolic panel  (Ca, Cl, CO2, Creat, Gluc, K, Na, BUN)           Advised to eat multiple snacks during the day discussed signs and symptoms of low sugar.  Keep well-hydrated increase fluid intake.  Hemodynamics are at goal.  Monitor kidney function closely.  Denies any obstructive urinary symptoms.  Monitor heart rate is on flecainide has sinus bradycardia by the EKG QT is normal.  Repeat basic metabolic panel couple weeks.  All questions answered       CONSULTATION/REFERRAL to   Work on weight loss  Regular exercise  See Patient Instructions      Subjective   Zachariah is a 79 year old, presenting for the following health issues:  Follow Up        6/19/2023     8:31 AM   Additional Questions   Roomed by Antoinette BARRAGAN CMA       Video Start Time:  9:30 am    History of Present Illness       Reason for visit:  Questions I sent in patient portal to Dr Reyes, from my Wellness visit test results.    He eats 4 or more servings of fruits and vegetables daily.He consumes 0 sweetened beverage(s) daily.He exercises with enough effort to increase his heart rate 20 to 29 minutes per day.  He exercises with enough effort to increase his heart rate 5 days per week.   He is taking medications regularly.     Patient presenting to discuss lab results.  Labs show acute kidney injury as well as low blood sugar level.  EKG shows sinus bradycardia is on  flecainide.        Review of Systems  Constitutional, HEENT, cardiovascular, pulmonary, gi and gu systems are negative, except as otherwise noted.      Objective           Vitals:  No vitals were obtained today due to virtual visit.    Physical Exam   GENERAL: alert and no distress  EYES: Eyes grossly normal to inspection.  No discharge or erythema, or obvious scleral/conjunctival abnormalities.  RESP: No audible wheeze, cough, or visible cyanosis.    SKIN: Visible skin clear. No significant rash, abnormal pigmentation or lesions.  NEURO: Cranial nerves grossly intact.  Mentation and speech appropriate for age.  PSYCH: Appropriate affect, tone, and pace of words    Office Visit on 06/19/2024   Component Date Value Ref Range Status    Cholesterol 06/19/2024 110  <200 mg/dL Final    Triglycerides 06/19/2024 94  <150 mg/dL Final    Direct Measure HDL 06/19/2024 56  >=40 mg/dL Final    LDL Cholesterol Calculated 06/19/2024 35  <=100 mg/dL Final    Non HDL Cholesterol 06/19/2024 54  <130 mg/dL Final    Patient Fasting > 8hrs? 06/19/2024 No   Final    TSH 06/19/2024 1.25  0.30 - 4.20 uIU/mL Final    Sodium 06/19/2024 139  135 - 145 mmol/L Final    Reference intervals for this test were updated on 09/26/2023 to more accurately reflect our healthy population. There may be differences in the flagging of prior results with similar values performed with this method. Interpretation of those prior results can be made in the context of the updated reference intervals.     Potassium 06/19/2024 4.6  3.4 - 5.3 mmol/L Final    Carbon Dioxide (CO2) 06/19/2024 26  22 - 29 mmol/L Final    Anion Gap 06/19/2024 10  7 - 15 mmol/L Final    Urea Nitrogen 06/19/2024 22.6  8.0 - 23.0 mg/dL Final    Creatinine 06/19/2024 1.24 (H)  0.67 - 1.17 mg/dL Final    GFR Estimate 06/19/2024 59 (L)  >60 mL/min/1.73m2 Final    eGFR calculated using 2021 CKD-EPI equation.    Calcium 06/19/2024 9.3  8.8 - 10.2 mg/dL Final    Chloride 06/19/2024 103  98 - 107  mmol/L Final    Glucose 06/19/2024 64 (L)  70 - 99 mg/dL Final    Alkaline Phosphatase 06/19/2024 59  40 - 150 U/L Final    AST 06/19/2024 25  0 - 45 U/L Final    Reference intervals for this test were updated on 6/12/2023 to more accurately reflect our healthy population. There may be differences in the flagging of prior results with similar values performed with this method. Interpretation of those prior results can be made in the context of the updated reference intervals.    ALT 06/19/2024 23  0 - 70 U/L Final    Reference intervals for this test were updated on 6/12/2023 to more accurately reflect our healthy population. There may be differences in the flagging of prior results with similar values performed with this method. Interpretation of those prior results can be made in the context of the updated reference intervals.      Protein Total 06/19/2024 6.8  6.4 - 8.3 g/dL Final    Albumin 06/19/2024 4.0  3.5 - 5.2 g/dL Final    Bilirubin Total 06/19/2024 0.6  <=1.2 mg/dL Final    Patient Fasting > 8hrs? 06/19/2024 No   Final    PSA Tumor Marker 06/19/2024 <0.01  0.00 - 6.50 ng/mL Final    Creatinine Urine mg/dL 06/19/2024 102.0  mg/dL Final    The reference ranges have not been established in urine creatinine. The results should be integrated into the clinical context for interpretation.    Albumin Urine mg/L 06/19/2024 <12.0  mg/L Final    The reference ranges have not been established in urine albumin. The results should be integrated into the clinical context for interpretation.    Albumin Urine mg/g Cr 06/19/2024    Final    Unable to calculate, urine albumin and/or urine creatinine is outside detectable limits.  Microalbuminuria is defined as an albumin:creatinine ratio of 17 to 299 for males and 25 to 299 for females. A ratio of albumin:creatinine of 300 or higher is indicative of overt proteinuria.  Due to biologic variability, positive results should be confirmed by a second, first-morning random or  24-hour timed urine specimen. If there is discrepancy, a third specimen is recommended. When 2 out of 3 results are in the microalbuminuria range, this is evidence for incipient nephropathy and warrants increased efforts at glucose control, blood pressure control, and institution of therapy with an angiotensin-converting-enzyme (ACE) inhibitor (if the patient can tolerate it).      Vitamin B12 06/19/2024 1,020  232 - 1,245 pg/mL Final    Vitamin D, Total (25-Hydroxy) 06/19/2024 58 (H)  20 - 50 ng/mL Final    indicates supplementation, with increased risk of hypercalciuria    WBC Count 06/19/2024 6.4  4.0 - 11.0 10e3/uL Final    RBC Count 06/19/2024 4.55  4.40 - 5.90 10e6/uL Final    Hemoglobin 06/19/2024 13.8  13.3 - 17.7 g/dL Final    Hematocrit 06/19/2024 42.8  40.0 - 53.0 % Final    MCV 06/19/2024 94  78 - 100 fL Final    MCH 06/19/2024 30.3  26.5 - 33.0 pg Final    MCHC 06/19/2024 32.2  31.5 - 36.5 g/dL Final    RDW 06/19/2024 12.1  10.0 - 15.0 % Final    Platelet Count 06/19/2024 194  150 - 450 10e3/uL Final    % Neutrophils 06/19/2024 62  % Final    % Lymphocytes 06/19/2024 22  % Final    % Monocytes 06/19/2024 11  % Final    % Eosinophils 06/19/2024 5  % Final    % Basophils 06/19/2024 0  % Final    % Immature Granulocytes 06/19/2024 0  % Final    Absolute Neutrophils 06/19/2024 4.0  1.6 - 8.3 10e3/uL Final    Absolute Lymphocytes 06/19/2024 1.4  0.8 - 5.3 10e3/uL Final    Absolute Monocytes 06/19/2024 0.7  0.0 - 1.3 10e3/uL Final    Absolute Eosinophils 06/19/2024 0.3  0.0 - 0.7 10e3/uL Final    Absolute Basophils 06/19/2024 0.0  0.0 - 0.2 10e3/uL Final    Absolute Immature Granulocytes 06/19/2024 0.0  <=0.4 10e3/uL Final         Video-Visit Details    Type of service:  Video Visit   Video End Time: 10:00 am  Originating Location (pt. Location): Home    Distant Location (provider location):  On-site  Platform used for Video Visit: David  Signed Electronically by: Edu Reyes MD

## 2024-07-16 ENCOUNTER — OFFICE VISIT (OUTPATIENT)
Dept: PHARMACY | Facility: CLINIC | Age: 79
End: 2024-07-16
Attending: INTERNAL MEDICINE
Payer: MEDICARE

## 2024-07-16 VITALS — BODY MASS INDEX: 26.88 KG/M2 | SYSTOLIC BLOOD PRESSURE: 112 MMHG | DIASTOLIC BLOOD PRESSURE: 60 MMHG | WEIGHT: 182 LBS

## 2024-07-16 DIAGNOSIS — I10 PRIMARY HYPERTENSION: Primary | ICD-10-CM

## 2024-07-16 DIAGNOSIS — E03.9 HYPOTHYROIDISM, UNSPECIFIED TYPE: ICD-10-CM

## 2024-07-16 DIAGNOSIS — G47.00 INSOMNIA, UNSPECIFIED TYPE: ICD-10-CM

## 2024-07-16 DIAGNOSIS — H40.003 GLAUCOMA SUSPECT, BILATERAL: ICD-10-CM

## 2024-07-16 DIAGNOSIS — I48.0 PAROXYSMAL ATRIAL FIBRILLATION (H): ICD-10-CM

## 2024-07-16 DIAGNOSIS — Z29.8 * * * SBE PROPHYLAXIS * * *: ICD-10-CM

## 2024-07-16 DIAGNOSIS — N52.9 ERECTILE DYSFUNCTION, UNSPECIFIED ERECTILE DYSFUNCTION TYPE: ICD-10-CM

## 2024-07-16 DIAGNOSIS — K21.9 GASTROESOPHAGEAL REFLUX DISEASE, UNSPECIFIED WHETHER ESOPHAGITIS PRESENT: ICD-10-CM

## 2024-07-16 DIAGNOSIS — G47.33 OBSTRUCTIVE SLEEP APNEA: ICD-10-CM

## 2024-07-16 DIAGNOSIS — R21 RASH: ICD-10-CM

## 2024-07-16 DIAGNOSIS — Z78.9 TAKES DIETARY SUPPLEMENTS: ICD-10-CM

## 2024-07-16 DIAGNOSIS — E78.5 HYPERLIPIDEMIA, UNSPECIFIED HYPERLIPIDEMIA TYPE: ICD-10-CM

## 2024-07-16 PROCEDURE — 99207 PR NO CHARGE LOS: CPT | Performed by: PHARMACIST

## 2024-07-16 RX ORDER — FLUOROURACIL 50 MG/G
CREAM TOPICAL DAILY
COMMUNITY

## 2024-07-16 NOTE — PROGRESS NOTES
Medication Therapy Management (MTM) Encounter    ASSESSMENT:                            Medication Adherence/Access: No issues identified    Hypertension/A. Fib:   Stable. Patient is meeting blood pressure goal of < 130/80mmHg.    Hyperlipidemia:   Stable.     GERD:   Stable. Current treatment is effective.     Hypothyroidism:   Stable. Last TSH is within normal limits.      Insomnia/HEATHER:  Would prefer he's not using diphenhydramine due to risk of anticholinergic side effects.  May benefit from another trial of melatonin, will be most effective taken 1-2 hours prior to bedtime.  If this is not effective could consider alternative such as trazodone, mirtazapine or doxepin.    SBE Prophylaxis:  Stable.    Rash:  Stable.    Glaucoma:  Stable.    ED:  Stable.    Supplements:  It's questionable how much benefit he's getting from all of his supplements.     PLAN:                            Try taking melatonin 1-2 hours prior to preferred bedtime.  Try using this instead of Benadryl (diphenhydramine).  Let us know you don't find melatonin effective and we can consider a prescription option such as trazodone, mirtazapine or doxepin.  It's up to you whether you want to continue Vitamin C, magnesium and glucosamine/chondroitin.    Follow-up: Return in about 4 weeks (around 8/13/2024) for check in via [employer] - let me know how things are going with sleep.    SUBJECTIVE/OBJECTIVE:                          Zachariah Ram is a 79 year old male seen for an initial visit. He was referred to me from Dr. Reyes.  He has seen MTM in the past, but it's been several years since his last visit.     Reason for visit: Comprehensive medication review.    Allergies/ADRs: Reviewed in chart  Past Medical History: Reviewed in chart  Tobacco: He reports that he has never smoked. He has never used smokeless tobacco.  Alcohol: 1-3 beverages / week  Other Substance Use: None  Caffeine: 1/2-1 cup of coffee/day  Activity: Does stretching exercises  from physical therapy/chiropractor daily for 15-30 minutes about 5x/week.  Also walks for 1 hour outside or for 20 minutes on treadmill at an incline 5 days/week.    Medication Adherence/Access: no issues reported    Hypertension /A. Fib:  Eliquis 5mg twice daily   Flecainide 100mg twice daily   Losartan 50mg twice daily   Patient reports no current medication side effects  Patient does not self-monitor blood pressure.  Patient had TIA about 18 months ago.  Had Watchman procedure afterward.  Has 1 year Watchman follow-up with [facility] scheduled soon.  He rarely has A. Fib symptoms with flecainide on board.     Hyperlipidemia   Rosuvastatin 20mg daily  Patient reports no significant myalgias or other side effects.      GERD    Omeprazole 20 mg once daily   Patient reports no current symptoms.   Patient feels that current regimen is effective.     Hypothyroidism   Levothyroxine 100 mcg daily.   Patient is having the following symptoms: none.      Insomnia/HEATHER:  Acetaminophen 500mg at bedtime as needed - uses about 2x/week  Diphenhydramine 25mg at bedtime as needed - using most nights  Melatonin 10mg at bedtime - also using about 2x/week  He consistently uses CPAP.  He's aware of risks with diphenhydramine therapy, tried melatonin but didn't find it as helpful alone.    He generally falls asleep ok with diphenhydramine, sometimes has a hard time falling back to sleep after getting up to use the restroom.  He generally does not feel fatigued during the day.    SBE Prophylaxis:  Amoxicillin 2g prior to dental appointments  History of mitral valve repair in 1996.  Denies side effects.    Rash:  Hydrocortisone 0.5% cream daily as needed   Fluorouracil 5% cream daily as needed for sun spots on scalp, per direction of dermatology  Ketoconazole 2% cream and shampoo daily as needed   Tretinoin 0.05% cream at bedtime   Follows closely with dermatology.  Denies side effects.    Glaucoma:  Latanoprost 0.005% eye drops in both  eyes daily  He denies side effects.  Has regular follow-up with eye clinic.    ED:  Cialis 10mg daily as needed - has available, rarely uses  Follows closely with urology due to history of prostatectomy.  Tries to avoid Cialis if possible because it makes him a bit lightheaded.    Supplements   Vitamin C 500mg twice daily   Citracal 2 tablets daily  Vitamin D 1000 international unit(s) daily - reduced following elevated level in June 2024  Magnesium 250mg daily   Glucosamine/chondroitin daily  Daily multivitamin   Potassium gluconate 595mg daily  Vitamin B12 500mcg daily  No reported issues at this time.        Today's Vitals: /60   Wt 182 lb (82.6 kg)   BMI 26.88 kg/m    ----------------    I spent 60 minutes with this patient today. A copy of the visit note was provided to the patient's provider(s).    A summary of these recommendations was given to the patient.    Melia Jaime, PharmD, [facility]  Medication Therapy Management Provider  [phone]      Medication Therapy Recommendations  Insomnia, unspecified type    Current Medication: diphenhydrAMINE (BENADRYL) 25 MG tablet   Rationale: Unsafe medication for the patient - Adverse medication event - Safety   Recommendation: Change Medication - Melatonin 10 MG Tabs tablet   Status: Accepted - no CPA Needed         Takes dietary supplements    Current Medication: Ascorbic Acid (VITAMIN C) 500 MG CAPS   Rationale: Nonmedication therapy more appropriate - Unnecessary medication therapy - Indication   Recommendation: Discontinue Medication   Status: Accepted - no CPA Needed

## 2024-07-16 NOTE — PATIENT INSTRUCTIONS
"Recommendations from today's MTM visit:                                                    MTM (medication therapy management) is a service provided by a clinical pharmacist designed to help you get the most of out of your medicines.   Today we reviewed what your medicines are for, how to know if they are working, that your medicines are safe and how to make your medicine regimen as easy as possible.      Try taking melatonin 1-2 hours prior to preferred bedtime.  Try using this instead of Benadryl (diphenhydramine).  Let us know you don't find melatonin effective and we can consider a prescription option such as trazodone, mirtazapine or doxepin.  It's up to you whether you want to continue Vitamin C, magnesium and glucosamine/chondroitin.    Follow-up: Return in about 4 weeks (around 8/13/2024) for check in via [employer] - let me know how things are going with sleep.    It was great speaking with you today.  I value your experience and would be very thankful for your time in providing feedback in our clinic survey. In the next few days, you may receive an email or text message from [employer] with a link to a survey related to your  clinical pharmacist.\"     To schedule another MTM appointment, please call the clinic directly or you may call the MTM scheduling line at [phone] or toll-free at [phone].     My Clinical Pharmacist's contact information:                                                      Please feel free to contact me with any questions or concerns you have.      Melia Jaime, PharmD, [facility]  Medication Therapy Management Provider  [phone]    "

## 2024-07-29 ENCOUNTER — DOCUMENTATION ONLY (OUTPATIENT)
Dept: OTHER | Facility: CLINIC | Age: 79
End: 2024-07-29
Payer: MEDICARE

## 2024-07-31 ENCOUNTER — DOCUMENTATION ONLY (OUTPATIENT)
Dept: OTHER | Facility: CLINIC | Age: 79
End: 2024-07-31
Payer: MEDICARE

## 2024-08-07 DIAGNOSIS — E78.5 HYPERLIPIDEMIA, UNSPECIFIED HYPERLIPIDEMIA TYPE: ICD-10-CM

## 2024-08-07 RX ORDER — ROSUVASTATIN CALCIUM 20 MG/1
20 TABLET, COATED ORAL DAILY
Qty: 90 TABLET | Refills: 0 | Status: SHIPPED | OUTPATIENT
Start: 2024-08-07

## 2024-08-21 ENCOUNTER — DOCUMENTATION ONLY (OUTPATIENT)
Dept: OTHER | Facility: CLINIC | Age: 79
End: 2024-08-21
Payer: MEDICARE

## 2024-08-22 DIAGNOSIS — I48.92 ATRIAL FLUTTER, UNSPECIFIED TYPE (H): ICD-10-CM

## 2024-08-22 DIAGNOSIS — K21.9 GASTROESOPHAGEAL REFLUX DISEASE, UNSPECIFIED WHETHER ESOPHAGITIS PRESENT: ICD-10-CM

## 2024-08-22 DIAGNOSIS — I48.0 PAROXYSMAL ATRIAL FIBRILLATION (H): ICD-10-CM

## 2024-08-22 RX ORDER — FLECAINIDE ACETATE 100 MG/1
100 TABLET ORAL 2 TIMES DAILY
Qty: 180 TABLET | Refills: 3 | OUTPATIENT
Start: 2024-08-22

## 2024-08-22 NOTE — TELEPHONE ENCOUNTER
"Patient calling back. Relayed message. He states, \"I have been doing my cardiology stuff for a year and a half at [facility].\"    Writer advised patient to call [facility] to request refill. He verbalized understanding. He will call back if they will not fill.        "

## 2024-08-22 NOTE — TELEPHONE ENCOUNTER
Typically this drug is managed by cardiology , we can refill a limited supply if he is in a pinch, but it is in his best interest to allow the specialty clinic to manage this drug, please inform him

## 2024-08-22 NOTE — TELEPHONE ENCOUNTER
Refused    flecainide (TAMBOCOR) 100 MG tablet [Pharmacy Med Name: Flecainide Acetate 100 MG Oral Tablet]     Triage called patients mail order pharmacy with request to contact cardiology for Flecainide refill. Left VM requesting a call back from pt. On call back, please advice pt to follow up with cardiology re: refill above.

## 2024-08-30 ENCOUNTER — TELEPHONE (OUTPATIENT)
Dept: FAMILY MEDICINE | Facility: CLINIC | Age: 79
End: 2024-08-30
Payer: MEDICARE

## 2024-08-30 NOTE — TELEPHONE ENCOUNTER
Medication Question or Refill    Contacts       Contact Date/Time Type Contact Phone/Fax    08/30/2024 10:16 AM CDT Phone (Incoming) Zachariah Ram (Self) [phone] (M)            What medication are you calling about (include dose and sig)?: flecainide (TAMBOCOR)     Preferred Pharmacy:    [facility] - [address]  [address]  [facility] [address]  Phone: [phone] Fax: [phone]    ** based on his last refill request.   Controlled Substance Agreement on file:   CSA -- Patient Level:    CSA: None found at the patient level.       Who prescribed the medication?: Aaron Quintero    Do you need a refill? Yes    When did you use the medication last? Patient states he has about 30 days remaining.     Patient offered an appointment? No    Do you have any questions or concerns?  Patient would like to make sure that he has another set of refill for the following 3 months.     Transferred call to RN for additional questions.     Could we send this information to you [facility] or would you prefer to receive a phone call?:   Patient would prefer a phone call   Okay to leave a detailed message?: Yes at Cell number on file:    Telephone Information:   Mobile [phone]

## 2024-08-30 NOTE — TELEPHONE ENCOUNTER
Jad urrutia this is a mutual patient, I would appreciate if you refill his cardiac medications/flecainide as you find appropriate.  Thank you

## 2024-08-30 NOTE — TELEPHONE ENCOUNTER
Pt called the clinic back requesting that his PCP would refill this medication as his Cardiology team at [facility] states they will not fill it since they are not treating him specifically for Afib.     Routing request to PCP for review - given this information, are you able to take over prescribing this for him?    Med pended for your review.   Thank you,  Berna Frias RN

## 2024-09-01 DIAGNOSIS — L30.9 DERMATITIS: ICD-10-CM

## 2024-09-03 RX ORDER — FLECAINIDE ACETATE 100 MG/1
100 TABLET ORAL 2 TIMES DAILY
Qty: 180 TABLET | Refills: 3 | Status: SHIPPED | OUTPATIENT
Start: 2024-09-03

## 2024-09-03 RX ORDER — KETOCONAZOLE 20 MG/ML
SHAMPOO TOPICAL
Qty: 360 ML | Refills: 0 | Status: SHIPPED | OUTPATIENT
Start: 2024-09-03

## 2024-09-23 ENCOUNTER — MYC MEDICAL ADVICE (OUTPATIENT)
Dept: FAMILY MEDICINE | Facility: CLINIC | Age: 79
End: 2024-09-23
Payer: MEDICARE

## 2024-09-25 ENCOUNTER — IMMUNIZATION (OUTPATIENT)
Dept: FAMILY MEDICINE | Facility: CLINIC | Age: 79
End: 2024-09-25
Payer: MEDICARE

## 2024-09-25 DIAGNOSIS — N18.31 STAGE 3A CHRONIC KIDNEY DISEASE (H): Primary | ICD-10-CM

## 2024-09-25 DIAGNOSIS — Z23 NEED FOR PROPHYLACTIC VACCINATION AND INOCULATION AGAINST INFLUENZA: Primary | ICD-10-CM

## 2024-09-25 DIAGNOSIS — Z23 HIGH PRIORITY FOR 2019-NCOV VACCINE: ICD-10-CM

## 2024-09-25 PROCEDURE — 91320 SARSCV2 VAC 30MCG TRS-SUC IM: CPT

## 2024-09-25 PROCEDURE — 90662 IIV NO PRSV INCREASED AG IM: CPT

## 2024-09-25 PROCEDURE — G0008 ADMIN INFLUENZA VIRUS VAC: HCPCS

## 2024-09-25 PROCEDURE — 90480 ADMN SARSCOV2 VAC 1/ONLY CMP: CPT

## 2024-09-25 PROCEDURE — 99207 PR NO CHARGE NURSE ONLY: CPT

## 2024-10-02 DIAGNOSIS — I48.0 PAROXYSMAL ATRIAL FIBRILLATION (H): ICD-10-CM

## 2024-10-02 DIAGNOSIS — I48.92 ATRIAL FLUTTER, UNSPECIFIED TYPE (H): ICD-10-CM

## 2024-10-02 RX ORDER — APIXABAN 5 MG/1
5 TABLET, FILM COATED ORAL 2 TIMES DAILY
Qty: 180 TABLET | Refills: 3 | Status: SHIPPED | OUTPATIENT
Start: 2024-10-02

## 2024-10-14 DIAGNOSIS — G47.00 INSOMNIA: Primary | ICD-10-CM

## 2024-10-14 RX ORDER — MIRTAZAPINE 7.5 MG/1
7.5 TABLET, FILM COATED ORAL AT BEDTIME
Qty: 90 TABLET | Refills: 0 | Status: SHIPPED | OUTPATIENT
Start: 2024-10-14

## 2024-10-22 NOTE — TELEPHONE ENCOUNTER
PCP- please see mychart:    See patient's mychart message    Please respond back to patient or send to triage to follow up. If patient needs to be scheduled, please route to team coordinators.    Sudheer Millan RN  [facility]             [Consultation] : a consultation visit [Other: _____] : [unfilled]

## 2024-10-23 ENCOUNTER — TELEPHONE (OUTPATIENT)
Dept: FAMILY MEDICINE | Facility: CLINIC | Age: 79
End: 2024-10-23

## 2024-10-23 NOTE — TELEPHONE ENCOUNTER
General Call      Reason for Call:  wants to know if order request has been received from [employer]    What are your questions or concerns:  Pt has found a CPAP mask that he feels will work better for him on [email or web address]. They have sent in a request for prescription for the mask to Dr. Luu. It was faxed 10/23/24 in the morning. Faxed to [phone]. Please contact Zachariah and let him know when prescription has been sent    Date of last appointment with provider: 12/21/2023    Could we send this information to you in [employer] or would you prefer to receive a phone call?:   Patient would prefer a phone call   Okay to leave a detailed message?: Yes at Home number on file [phone] (home)

## 2024-10-25 ENCOUNTER — TELEPHONE (OUTPATIENT)
Dept: SLEEP MEDICINE | Facility: CLINIC | Age: 79
End: 2024-10-25
Payer: MEDICARE

## 2024-10-25 DIAGNOSIS — G47.33 OSA (OBSTRUCTIVE SLEEP APNEA): Primary | ICD-10-CM

## 2024-10-25 NOTE — TELEPHONE ENCOUNTER
Pt requesting a ComfortGel Blue Nasal CPAP Mask. This is an online company at [employer]. Manufacture is [employer] Size medium with head gear.. Previous mask he has been using is discontinued. Please fax order to [phone]     Thank you,    Griselda Elder    [facility]

## 2024-11-08 ENCOUNTER — MYC MEDICAL ADVICE (OUTPATIENT)
Dept: CARDIOLOGY | Facility: CLINIC | Age: 79
End: 2024-11-08
Payer: MEDICARE

## 2024-11-11 ENCOUNTER — LAB (OUTPATIENT)
Dept: LAB | Facility: CLINIC | Age: 79
End: 2024-11-11
Payer: MEDICARE

## 2024-11-11 DIAGNOSIS — N17.9 AKI (ACUTE KIDNEY INJURY) (H): ICD-10-CM

## 2024-11-11 DIAGNOSIS — N18.31 STAGE 3A CHRONIC KIDNEY DISEASE (H): ICD-10-CM

## 2024-11-11 PROCEDURE — 36415 COLL VENOUS BLD VENIPUNCTURE: CPT

## 2024-11-11 PROCEDURE — 80048 BASIC METABOLIC PNL TOTAL CA: CPT

## 2024-11-11 PROCEDURE — 84165 PROTEIN E-PHORESIS SERUM: CPT | Performed by: STUDENT IN AN ORGANIZED HEALTH CARE EDUCATION/TRAINING PROGRAM

## 2024-11-11 PROCEDURE — 84155 ASSAY OF PROTEIN SERUM: CPT

## 2024-11-11 PROCEDURE — 82610 CYSTATIN C: CPT

## 2024-11-12 LAB
ALBUMIN SERPL ELPH-MCNC: 3.9 G/DL (ref 3.7–5.1)
ALPHA1 GLOB SERPL ELPH-MCNC: 0.2 G/DL (ref 0.2–0.4)
ALPHA2 GLOB SERPL ELPH-MCNC: 0.5 G/DL (ref 0.5–0.9)
ANION GAP SERPL CALCULATED.3IONS-SCNC: 9 MMOL/L (ref 7–15)
B-GLOBULIN SERPL ELPH-MCNC: 0.9 G/DL (ref 0.6–1)
BUN SERPL-MCNC: 19.8 MG/DL (ref 8–23)
CALCIUM SERPL-MCNC: 8.9 MG/DL (ref 8.8–10.4)
CHLORIDE SERPL-SCNC: 105 MMOL/L (ref 98–107)
CREAT SERPL-MCNC: 1.19 MG/DL (ref 0.67–1.17)
CYSTATIN C (ROCHE): 1.1 MG/L (ref 0.6–1)
EGFRCR SERPLBLD CKD-EPI 2021: 62 ML/MIN/1.73M2
GAMMA GLOB SERPL ELPH-MCNC: 1.1 G/DL (ref 0.7–1.6)
GFR/BSA.PRED SERPLBLD CYS-BASED-ARV: 63 ML/MIN/1.73M2
GLUCOSE SERPL-MCNC: 93 MG/DL (ref 70–99)
HCO3 SERPL-SCNC: 24 MMOL/L (ref 22–29)
LOCATION OF TASK: NORMAL
M PROTEIN SERPL ELPH-MCNC: 0 G/DL
POTASSIUM SERPL-SCNC: 4.5 MMOL/L (ref 3.4–5.3)
PROT PATTERN SERPL ELPH-IMP: NORMAL
SODIUM SERPL-SCNC: 138 MMOL/L (ref 135–145)
TOTAL PROTEIN SERUM FOR ELP: 6.6 G/DL (ref 6.4–8.3)

## 2024-12-02 ENCOUNTER — VIRTUAL VISIT (OUTPATIENT)
Dept: PHARMACY | Facility: CLINIC | Age: 79
End: 2024-12-02
Payer: COMMERCIAL

## 2024-12-02 DIAGNOSIS — G47.00 INSOMNIA, UNSPECIFIED TYPE: Primary | ICD-10-CM

## 2024-12-02 PROCEDURE — 99207 PR NO CHARGE LOS: CPT | Mod: 95 | Performed by: PHARMACIST

## 2024-12-02 NOTE — PROGRESS NOTES
Medication Therapy Management (MTM) Encounter    ASSESSMENT:                            Medication Adherence/Access: No issues identified.    Insomnia   Symptoms are overall improved.  Per [employer] - mirtazapine can cause dry mouth and cough in a small % of patients, he denies any cough symptoms.   May benefit from reducing/stopping melatonin, this may help reduce vivid dreams.    PLAN:                            You can work on stopping melatonin - this can be stopped abruptly or you can gradually reduce.    Follow-up: Return in about 3 months (around 3/2/2025) for follow up medication review.    SUBJECTIVE/OBJECTIVE:                          Zachariah Ram is a 79 year old male seen for a follow-up visit.       Reason for visit: Follow-up on insomnia.    Tobacco: He reports that he has never smoked. He has never used smokeless tobacco.  Alcohol: about 4oz of venkat per week    Medication Adherence/Access: no issues reported.    Insomnia   Acetaminophen 500mg at bedtime as needed - uses once per week or less  Melatonin 10mg at bedtime   Mirtazapine 7.5mg at bedtime - started about 1 month ago  He consistently uses CPAP.  He reports he is sleeping better since starting mirtazapine. Per his BMEYE Nichol, before mirtazapine was averaging 6 hours of sleep each night, now is average of 7 hours. Waking up over night to go to the bathroom has gone down from 2-3 times/night to 1-2 times per night since starting.  Has been having a horse voice, thinks this may be more often than it was before mirtazapine was started - has increased water intake, doesn't think it is impacting anything else  Has gained a few pounds in the past month, is having weird dreams (him trying to do something and can't quite do it, usually relates to something going on in his life) almost every night but says these were happening before starting mirtazapine as well     Today's Vitals: There were no vitals taken for this visit.  ----------------    I  spent 14 minutes with this patient today. All changes were made via collaborative practice agreement with Edu Reyes MD. A copy of the visit note was provided to the patient's provider(s).    A summary of these recommendations was sent via [employer].    Una EwingD  Medication Therapy Management Pharmacy Resident  [facility]  [phone]    Melia Jaime PharmD, [facility]  Medication Therapy Management Provider  [facility]  [phone]    Telemedicine Visit Details  The patient's medications can be safely assessed via a telemedicine encounter.  Type of service:  Video Conference via [employer]  Originating Location (pt. Location): Home    Distant Location (provider location):  On-site  Joined the call at 12/2/2024, 2:36:19 pm.  Left the call at 12/2/2024, 2:50:40 pm.     Medication Therapy Recommendations  Insomnia, unspecified type   1 Current Medication: Melatonin 10 MG CAPS   Current Medication Sig: Take 10 mg by mouth daily   Rationale: Order duration inappropriate - Dosage too high - Safety   Recommendation: Decrease Ordered Duration of Medication   Status: Accepted - no CPA Needed   Identified Date: 12/2/2024 Completed Date: 12/2/2024

## 2024-12-02 NOTE — PATIENT INSTRUCTIONS
"Recommendations from today's MTM visit:                                                    MTM (medication therapy management) is a service provided by a clinical pharmacist designed to help you get the most of out of your medicines.   Today we reviewed what your medicines are for, how to know if they are working, that your medicines are safe and how to make your medicine regimen as easy as possible.      You can work on stopping melatonin - this can be stopped abruptly or you can gradually reduce.    Follow-up: Return in about 3 months (around 3/2/2025) for follow up medication review.    It was great speaking with you today.  I value your experience and would be very thankful for your time in providing feedback in our clinic survey. In the next few days, you may receive an email or text message from [employer] with a link to a survey related to your  clinical pharmacist.\"     To schedule another MTM appointment, please call the clinic directly or you may call the MTM scheduling line at [phone] or toll-free at [phone].     My Clinical Pharmacist's contact information:                                                      Please feel free to contact me with any questions or concerns you have.      Gail Jose, PharmD  Medication Therapy Management Pharmacy Resident  [facility]  [phone]    Melia Jaime PharmD, BCACP  Medication Therapy Management Provider  [facility]  [phone]     "

## 2024-12-03 DIAGNOSIS — E78.5 HYPERLIPIDEMIA, UNSPECIFIED HYPERLIPIDEMIA TYPE: ICD-10-CM

## 2024-12-03 RX ORDER — ROSUVASTATIN CALCIUM 20 MG/1
20 TABLET, COATED ORAL DAILY
Qty: 90 TABLET | Refills: 1 | Status: SHIPPED | OUTPATIENT
Start: 2024-12-03

## 2024-12-08 DIAGNOSIS — G47.00 INSOMNIA: ICD-10-CM

## 2024-12-09 RX ORDER — MIRTAZAPINE 7.5 MG/1
7.5 TABLET, FILM COATED ORAL AT BEDTIME
Qty: 90 TABLET | Refills: 3 | Status: SHIPPED | OUTPATIENT
Start: 2024-12-09

## 2024-12-09 NOTE — TELEPHONE ENCOUNTER
Writer spoke with patient and he stated he ended up getting a different mask through [facility]. Patient stated everything is fine now and no need to follow up regarding [email or web address]. No further actions needed.

## 2024-12-16 ENCOUNTER — OFFICE VISIT (OUTPATIENT)
Dept: FAMILY MEDICINE | Facility: CLINIC | Age: 79
End: 2024-12-16
Payer: MEDICARE

## 2024-12-16 VITALS
HEART RATE: 46 BPM | SYSTOLIC BLOOD PRESSURE: 129 MMHG | RESPIRATION RATE: 16 BRPM | DIASTOLIC BLOOD PRESSURE: 59 MMHG | HEIGHT: 69 IN | OXYGEN SATURATION: 96 % | TEMPERATURE: 98 F | BODY MASS INDEX: 28.57 KG/M2 | WEIGHT: 192.9 LBS

## 2024-12-16 DIAGNOSIS — S69.91XA FINGERNAIL INJURY, RIGHT, INITIAL ENCOUNTER: Primary | ICD-10-CM

## 2024-12-16 PROCEDURE — 99213 OFFICE O/P EST LOW 20 MIN: CPT | Performed by: NURSE PRACTITIONER

## 2024-12-16 ASSESSMENT — PAIN SCALES - GENERAL: PAINLEVEL_OUTOF10: MILD PAIN (2)

## 2024-12-16 NOTE — PROGRESS NOTES
"  Assessment & Plan     Fingernail injury, right, initial encounter  Minor injury without significant pain.  Doubt a tuft fracture considering the lack of pain.  We talked about imaging but opted against at this time.  Will just continue to monitor and keep the  area clean and protected.  Follow-up with any change in symptoms        Subjective   Zachariah is a 79 year old, presenting for the following health issues:  Finger    History of Present Illness       Reason for visit:  Smashed finger nail, may lose it.  Symptom onset:  1-3 days ago  Symptoms include:  Bleeding around the fingernail, blue nail.  Symptom intensity:  Moderate  Symptom progression:  Staying the same  Had these symptoms before:  No  What makes it worse:  No  What makes it better:  Tylenol   He is taking medications regularly.     Carrying a table that opened and pinched his right 4th finger yesterday at about 630 am   There was just a small amount of clot after the incident  Pain is about 1/10  Tetanus up to date       Review of Systems  Detailed as above         Objective    /59 (BP Location: Right arm, Patient Position: Sitting, Cuff Size: Adult Regular)   Pulse (!) 46   Temp 98  F (36.7  C) (Oral)   Resp 16   Ht 1.753 m (5' 9\")   Wt 87.5 kg (192 lb 14.4 oz)   SpO2 96%   BMI 28.49 kg/m    There is no height or weight on file to calculate BMI.  Physical Exam  Constitutional:       Appearance: Normal appearance.   Pulmonary:      Effort: Pulmonary effort is normal.   Musculoskeletal:         General: Normal range of motion.   Skin:     Comments: Small subungual hematoma right 4th finger. Scant amt of blood around nail. No significant finger tenderness    Neurological:      Mental Status: He is alert.   Psychiatric:         Mood and Affect: Mood normal.                  Signed Electronically by: EDISON Velez CNP    "

## 2025-01-01 NOTE — PROGRESS NOTES
[facility]    I had the pleasure of seeing Zachariah when he came for follow up of AFib and HTN.  This 79 year old sees Dr. Toure for his history of:    1. HTN   2. Paroxysmal AFib, AT - first noted after surgery. Later started on flecainide 8/2017. Reduced dose led to RUBÉN/DCCV 8/2018.  S/p ligation of the BENTON at the time of his mitral valve annuloplasty 1996, but left on AC after RUBÉN 2017 showed continued communication. Now s/p Watchman ([facility]) 9/2023. Indefinite AC recommended.  Remains on flecainide.  3.  TIA - 3/2023 ([place]) despite Eliquis therapy. S/p 20 mm Watchman FLX 9/18/2023 ([facility]) with plans for indefinite AC use given TIA despite partial BENTON exclusion 1996 and chronic AC use  4. MR d/t severe MVP s/p Alayna mitral valve annuloplasty with partial BENTON exclusion (residual stump on 2023's RUBÉN) 6/1996 at [facility]  5. Prostate CA - had Lupron shorts 4/2002. S/p prostatectomy 5/2020   6. CAC - noted on CTA CAC 5/202, score 26.6 (15% of age/gender matched controls)  7.  HEATHER      Last Visit & Interval History:  I saw Zachariah 8/2022 at which time he was doing well on flecainide. Annual follow-up with Dr. Toure recommended, as well as updated echo. Unfortunately, this is the first time we're seeing him back since then as he has required care in [place] and at [facility] in [place].    He was in the ER 3/2023 for TIA ([place]), with a 3-minute period he could not walk straight with visual blurring. Brain MRI without acute infarct. 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 Saw Neurology (Dr. Godoy, [facility]) 4/2023 and described that ~10 days prior he had recurrent spells x 2 when getting up from bed. ZioPatch to assess for AFib as a cause of transient hypotension recommended. No AFib seen.    Was seen at [facility] 5/2023 for consideration of LAAO given TIA despite AC and partial BENTON exclusion at time of MV surgery. RUBÉN showed residual stump. Last Cardiology appt at [facility] 10/2024  "indicated he was doing well. AC was continued and no further follow-up with them for Watchman was recommended.      Today's Visit:  Zachariah is doing well! No issues with CP, SOB. Remains active, hiking in [place] without concerns. When not in [place], walks on treadmill at inclines and with 3# weights.    No c/o edema, orthopnea, PND.     Noted between TIA 3/2023 and implant 9/2023, had 2 short episodes of gait imbalance similar to what he presented in 3/2023 with.     Continues to feel flecainide is a \"miracle drug.\" Rarely notes skipped beats. No recurrent AFib that he's aware of. Confirmed that ZIoPatch worn after TIA showed no recurrent AFib.      VITALS:  Vitals: /58   Pulse 52   Ht 1.753 m (5' 9\")   Wt 86.2 kg (190 lb)   SpO2 98%   BMI 28.06 kg/m      Diagnostic Testing:  EKG today, which I overread, showed SB 54 bpm with QRS duration 114 ms on flecainide 100 mg BID  EKG 6/2024 showed SB 50 bpm with QRS duration 114 ms on flecainide 100 mg BID  CT 9/2024 closed BENOTN. Mild R/L dilation. CYNDI. Mild ao sclerosis  RUBÉN 5/2023 ([place]) LVEF 55%. MV repair OK. Diffuse mild immobile atherosclerosis of desc thoracic aorta. Mod LAE. BENTON with partially excluded.   Component      Latest Ref Rng 6/19/2024  8:35 AM 11/11/2024  4:20 PM   Sodium      135 - 145 mmol/L 139  138    Potassium      3.4 - 5.3 mmol/L 4.6  4.5    Carbon Dioxide (CO2)      22 - 29 mmol/L 26  24    Anion Gap      7 - 15 mmol/L 10  9    Urea Nitrogen      8.0 - 23.0 mg/dL 22.6  19.8    Creatinine      0.67 - 1.17 mg/dL 1.24 (H)  1.19 (H)    GFR Estimate      >60 mL/min/1.73m2 59 (L)  62    Calcium      8.8 - 10.4 mg/dL 9.3  8.9    Chloride      98 - 107 mmol/L 103  105    Glucose      70 - 99 mg/dL 64 (L)  93         Plan:  Annual follow-up with me or Dr. Toure with updated echo      Assessment/Plan:    Paroxysmal AFib, AT  First noted many years ago 1996. On flecainide since 2017. Last intervention (RUBÉN/DCCV) 2018  Remains on unopposed flecainide d/t " "bradycardia  ZioPatch after TIA 2023 without AFib    EKG today looks great with stable intervals. No sxs with his known bradycardia  Reports no recurrent AFib/\"skips\" and flecainide is working well    CHADSVAS 6 (HTN, TIA, aortic plaquing, age). As above, s/p 20 mm Watchman at [place] 9/2023 but recommended for indefinite AC given had TIA despite chronic AC and s/p partial exclusion of BENTON back in 2016. CBC 9/2024 with slight drop in Hgb 12.7 g/dL    PLAN:  Continue flecainide  Annual follow-up with in-office EKG  Updated Echo 1 year    HTN  Remains on losartan 50 mg BID  BPs look great  BMP 11/0224 wnl    PLAN:  Continue current medications    CAC  CTA Calcium score 6/2022 placed him in 15% percentile with total score 26.6  RUBÉN 5/2023 ([place]) with nl LVEF 55%  Remains on Crestor 20 (dose unchanged at time of TIA). Last lipid panel 6/2024 with LDL 35 mg/dL    PLAN:  Continue current medications        Nakita Gayle PA-C, MSPAS      Orders Placed This Encounter   Procedures    Follow-Up with Cardiology TED    EKG 12-lead complete w/read - Clinics (performed today)    Echocardiogram Complete     No orders of the defined types were placed in this encounter.    There are no discontinued medications.      Encounter Diagnoses   Name Primary?    Paroxysmal atrial fibrillation (H)     H/O mitral valve repair Yes       CURRENT MEDICATIONS:  Current Outpatient Medications   Medication Sig Dispense Refill    acetaminophen (TYLENOL) 500 MG tablet Take 500 mg by mouth nightly as needed      amoxicillin (AMOXIL) 500 MG capsule TAKE 4 CAPSULES BY MOUTH 30 TO  60 MINUTES PRIOR TO DENTAL  PROCEDURE 12 capsule 1    Ascorbic Acid (VITAMIN C) 500 MG CAPS Take 500 mg by mouth 2 times daily      calcium citrate-vitamin D (CITRACAL) 200-6.25 MG-MCG TABS per tablet 2 tablets daily      cholecalciferol 50 MCG (2000 UT) CAPS Take 1 capsule by mouth daily      ELIQUIS ANTICOAGULANT 5 MG tablet TAKE 1 TABLET BY MOUTH TWICE  DAILY 180 tablet 3    " flecainide (TAMBOCOR) 100 MG tablet Take 1 tablet (100 mg) by mouth 2 times daily. 180 tablet 3    fluorouracil (EFUDEX) 5 % external cream Apply topically daily As needed for spots on scalp, per direction of dermatology      hydrocortisone (CORTAID) 0.5 % external cream Apply topically daily as needed      ketoconazole (NIZORAL) 2 % external cream Apply topically daily as needed for itching 60 g 3    ketoconazole (NIZORAL) 2 % external shampoo APPLY TO SCALP, LATHER, LEAVE IN PLACE FOR 5 MINUTES, THEN RINSE  OFF. USE ONCE EVERY 3 TO 4 DAYS  UP TO 8 WEEKS, THEN AS NEEDED AS DIRECTED 360 mL 0    latanoprost (XALATAN) 0.005 % ophthalmic solution Place 1 drop into both eyes daily      levothyroxine (SYNTHROID/LEVOTHROID) 100 MCG tablet TAKE 1 TABLET BY MOUTH DAILY 90 tablet 3    losartan (COZAAR) 50 MG tablet Take 1 tablet (50 mg) by mouth 2 times daily. 180 tablet 0    MAGNESIUM PO Take 500 mg by mouth daily      Melatonin 10 MG CAPS Take 5 mg by mouth daily.      mirtazapine (REMERON) 7.5 MG tablet TAKE 1 TABLET BY MOUTH AT  BEDTIME 90 tablet 3    Misc Natural Products (GLUCOSAMINE CHOND MSM FORMULA PO) Take 1 tablet by mouth daily  Contains Glucosamine 750 mg, Chrondroidtin 400 mg, , Vit D3 1,000 international unit(s)      Multiple Vitamins-Minerals (VITRUM 50+ SENIOR MULTI) TABS Take by mouth daily      omeprazole (PRILOSEC) 20 MG DR capsule TAKE 1 CAPSULE BY MOUTH DAILY 90 capsule 3    Potassium Gluconate 550 MG TABS Take 550 mg by mouth daily      rosuvastatin (CRESTOR) 20 MG tablet TAKE 1 TABLET BY MOUTH DAILY 90 tablet 1    tadalafil (CIALIS) 5 MG tablet Take 2 tablets (10 mg) by mouth daily as needed (Erectile dysfunction) 30 tablet 6    tretinoin (RETIN-A) 0.05 % external cream Apply topically At Bedtime      vitamin B-12 (CYANOCOBALAMIN) 500 MCG tablet Take 500 mcg by mouth daily         ALLERGIES     Allergies   Allergen Reactions    Amlodipine Swelling     Noted at 5 mg    Poison Ivy Extract Itching  "   Lisinopril Cough         Review of Systems:  Skin:  Negative     Eyes:  Positive for glasses  ENT:  Negative    Respiratory:  Negative for shortness of breath, dyspnea on exertion  Cardiovascular:  Negative for, palpitations, chest pain, fatigue, dizziness, edema    Gastroenterology: Negative for melena, hematochezia  Genitourinary:  Positive for prostate problem  Musculoskeletal:  Negative foot pain  Neurologic:  Negative    Psychiatric:  Negative    Heme/Lymph/Imm:  Positive for allergies  Endocrine:  Positive for thyroid disorder    Physical Exam:  Vitals: /58   Pulse 52   Ht 1.753 m (5' 9\")   Wt 86.2 kg (190 lb)   SpO2 98%   BMI 28.06 kg/m      Constitutional:  cooperative, alert and oriented, well developed, well nourished, in no acute distress appears younger than stated age      Skin:  warm and dry to the touch, no apparent skin lesions or masses noted        Head:  normocephalic, no masses or lesions        Eyes:           ENT:  no pallor or cyanosis        Neck:  JVP normal, no carotid bruit        Chest:  normal breath sounds, clear to auscultation, normal A-P diameter, normal symmetry, normal respiratory excursion, no use of accessory muscles        Cardiac: regular rhythm, normal S1/S2, no S3 or S4, apical impulse not displaced, no murmurs, gallops or rubs bradycardic                Abdomen:  abdomen soft        Vascular: pulses full and equal, no bruits auscultated                                      Extremities and Back:  no deformities, clubbing, cyanosis, erythema observed   Bilateral LE edema above sock, 1+    Neurological:  no gross motor deficits            PAST MEDICAL HISTORY:  Past Medical History:   Diagnosis Date    Antiplatelet or antithrombotic long-term use     Arrhythmia     A FIB    Atrial flutter (H)     BPH (benign prostatic hyperplasia)     Cerebral infarction (H) 3/19/23    \"Possible\" Brain TIA, further tests 2023.    ED (erectile dysfunction)     Elevated prostate " specific antigen (PSA)     GERD (gastroesophageal reflux disease)     Heart murmur     MVP    History of blood transfusion 1996 [place] open heart surgery.    Repaired mitral valve.    History of skin cancer     Hyperlipidemia     Hypertension     Insomnia     Left inguinal hernia     Lentigo maligna melanoma (H)     MVP (mitral valve prolapse)     Nasal congestion     Obstructive sleep apnea     SEVERE    -  USES CPAP    Paroxysmal atrial fibrillation (H)     Schatzki's ring     Seborrheic dermatitis     Smoking history     quit 1973    Thyroid disease Partial thyroidectomy 2008, benign tumor.    Thyroid controlled by levothyroxine.    Urinary frequency        PAST SURGICAL HISTORY:  Past Surgical History:   Procedure Laterality Date    ABDOMEN SURGERY  Hernia right side 1994.  Prostatectomy [place] 5/20    Normal side effects.    BIOPSY  For various skin cancers, prostate.    Treatment completed at the time.    CARDIOVERSION  08/07/2017    COLONOSCOPY  2015     at [facility] ([place], [place], [place])    EGD, GASTROESOPHAGEAL REFLUX TEST WITH MUCOSAL PH ELECTRODE      ENT SURGERY  Partial parotidectomy 2004    Benign tumor.    GENITOURINARY SURGERY  Prostatectomy [place] 5/20.    Usual side effects.    HC KNEE SCOPE,MED/LAT MENISCUS REPAIR Left 2016    HERNIA REPAIR  1994    HERNIORRHAPHY INGUINAL Left 01/27/2021    Procedure: OPEN LEFT INGUINAL HERNIA REPAIR;  Surgeon: Bernard Caballero MD;  Location:  OR    [facility]      Left atrial appendage ligation  1996    MR PROSTATE  W/O & W CONTRAST  2016, 2018    PAROTIDECTOMY  2004    partial parotidectomy    PROSTATECTOMY, RETROPUBIC, RADICAL, ROBOT-ASSISTED, LAP, USING DA LEONEL SI, W/PELVIC LYMPHADENECTOMY  05/13/2020    s/p RALP in 05/2020 at [place]. Path showed prostate cancer Sheeba 4+3=7    REPAIR VALVE MITRAL  1996    SKIN CANCER SCREENING      Skin cancer surgeries-- basal on ear 2008, melanoma on left sideburn 2011, carcinoma right arm 2013     THYROIDECTOMY  2008    partial thyroidectomy       FAMILY HISTORY:  Family History   Problem Relation Age of Onset    Coronary Artery Disease Early Onset Father     Coronary Artery Disease Father         Problems age 50,  age 66.    Diabetes Brother         Since est age 50, overweight then.    Coronary Artery Disease Brother         AFib,etc.  [place] pace maker/defibrulator    Diabetes Maternal Grandfather     Coronary Artery Disease Brother         Mitral valve repaire age 60    Other Cancer Sister          age 40,     Other Cancer Paternal Grandfather         Cancer of the mouth,  age 66.    Cerebrovascular Disease Paternal Grandfather         Age 66, passed 10 days    Hypertension Paternal Grandmother          10 days after a paralyzing stroke at age 66       SOCIAL HISTORY:  Social History     Socioeconomic History    Marital status:    Tobacco Use    Smoking status: Never    Smokeless tobacco: Never   Vaping Use    Vaping status: Never Used   Substance and Sexual Activity    Alcohol use: Yes     Comment: 3-4 ounces of venkat with water 2 per week.    Drug use: Never    Sexual activity: Yes     Partners: Female     Birth control/protection: Post-menopausal     Comment: ED from prostatectomy, work in progress.   Other Topics Concern    Parent/sibling w/ CABG, MI or angioplasty before 65F 55M? Yes     Comment: Father had heart attack age 50,  at 66.     Social Drivers of Health     Financial Resource Strain: Low Risk  (2024)    Financial Resource Strain     Within the past 12 months, have you or your family members you live with been unable to get utilities (heat, electricity) when it was really needed?: No   Food Insecurity: No Food Insecurity (2024)    Received from [facility]    Hunger Vital Sign     Worried About Running Out of Food in the Last Year: Never true     Ran Out of Food in the Last Year: Never true   Transportation Needs: No Transportation Needs (2024)     Received from [facility]    PRAPARE - Transportation     Lack of Transportation (Medical): No     Lack of Transportation (Non-Medical): No   Physical Activity: Sufficiently Active (9/25/2024)    Received from [facility]    Exercise Vital Sign     Days of Exercise per Week: 5 days     Minutes of Exercise per Session: 30 min   Stress: Stress Concern Present (6/14/2024)    [employer] - Occupational Stress Questionnaire     Feeling of Stress : Rather much   Social Connections: Unknown (6/14/2024)    Social Connection and Isolation Panel [NHANES]     Frequency of Social Gatherings with Friends and Family: Twice a week   Interpersonal Safety: Low Risk  (6/19/2024)    Interpersonal Safety     Do you feel physically and emotionally safe where you currently live?: Yes     Within the past 12 months, have you been hit, slapped, kicked or otherwise physically hurt by someone?: No     Within the past 12 months, have you been humiliated or emotionally abused in other ways by your partner or ex-partner?: No   Housing Stability: Low Risk  (9/25/2024)    Received from [facility]    Housing Stability     What is your living situation today?: I have a steady place to live

## 2025-01-06 ENCOUNTER — OFFICE VISIT (OUTPATIENT)
Dept: CARDIOLOGY | Facility: CLINIC | Age: 80
End: 2025-01-06
Payer: MEDICARE

## 2025-01-06 VITALS
HEIGHT: 69 IN | WEIGHT: 190 LBS | DIASTOLIC BLOOD PRESSURE: 58 MMHG | HEART RATE: 52 BPM | OXYGEN SATURATION: 98 % | SYSTOLIC BLOOD PRESSURE: 110 MMHG | BODY MASS INDEX: 28.14 KG/M2

## 2025-01-06 DIAGNOSIS — I48.0 PAROXYSMAL ATRIAL FIBRILLATION (H): ICD-10-CM

## 2025-01-06 DIAGNOSIS — Z98.890 H/O MITRAL VALVE REPAIR: Primary | ICD-10-CM

## 2025-01-06 PROCEDURE — 99214 OFFICE O/P EST MOD 30 MIN: CPT | Performed by: PHYSICIAN ASSISTANT

## 2025-01-06 PROCEDURE — 93000 ELECTROCARDIOGRAM COMPLETE: CPT | Performed by: PHYSICIAN ASSISTANT

## 2025-01-06 NOTE — PATIENT INSTRUCTIONS
Zachariah; - it was nice to see you today!    Today we reviewed:    EKG today showed good normal rhythm (just a little slow)  Reviewed TIA in [place], Watchman procedure 5/2023 at [place].   Reviewed that based on CT Calcium Score and echos (ultrasounds) no concern for sudden cardiac death    MEDICATION CHANGES:    NONE    RECOMMENDATIONS:    Continue routine activity    FOLLOW UP:    See us back ~1 year with updated echocardiogram    IMPORTANT PHONE NUMBERS FOR  [facility] ([place]):  Arrhythmia nurses Judy Parkinson, & Lucretia: [phone]

## 2025-01-06 NOTE — LETTER
1/6/2025    Edu Reyes MD  [address]  [place] [address]    RE: Zachariah Camarilloeric       Dear Colleague,     I had the pleasure of seeing Zachariah Ram in the [facility].  [facility]    I had the pleasure of seeing Zachariah when he came for follow up of AFib and HTN.  This 79 year old sees Dr. Toure for his history of:    1. HTN   2. Paroxysmal AFib, AT - first noted after surgery. Later started on flecainide 8/2017. Reduced dose led to RUBÉN/DCCV 8/2018.  S/p ligation of the BENTON at the time of his mitral valve annuloplasty 1996, but left on AC after RUBÉN 2017 showed continued communication. Now s/p Watchman ([facility]) 9/2023. Indefinite AC recommended.  Remains on flecainide.  3.  [place]IA - 3/2023 ([place]) despite Eliquis therapy. S/p 20 mm Watchman FLX 9/18/2023 ([facility]) with plans for indefinite AC use given TIA despite partial BENTON exclusion 1996 and chronic AC use  4. MR d/t severe MVP s/p Alayna mitral valve annuloplasty with partial BENTON exclusion (residual stump on 2023's RUBÉN) 6/1996 at [facility]  5. Prostate CA - had Lupron shorts 4/2002. S/p prostatectomy 5/2020   6. CAC - noted on CTA CAC 5/202, score 26.6 (15% of age/gender matched controls)  7.  HEATHER      Last Visit & Interval History:  I saw Zachariah 8/2022 at which time he was doing well on flecainide. Annual follow-up with Dr. Toure recommended, as well as updated echo. Unfortunately, this is the first time we're seeing him back since then as he has required care in [place] and at [facility] in [place].    He was in the ER 3/2023 for TIA ([place]), with a 3-minute period he could not walk straight with visual blurring. Brain MRI without acute infarct. 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 Saw Neurology (Dr. Godoy, [facility]) 4/2023 and described that ~10 days prior he had recurrent spells x 2 when getting up from bed. ZioPatch to assess for AFib as a cause of transient hypotension recommended.  "No AFib seen.    Was seen at [facility] 5/2023 for consideration of LAAO given TIA despite AC and partial BENTON exclusion at time of MV surgery. RUBÉN showed residual stump. Last Cardiology appt at [facility] 10/2024 indicated he was doing well. AC was continued and no further follow-up with them for Watchman was recommended.      Today's Visit:  Zachariah is doing well! No issues with CP, SOB. Remains active, hiking in [place] without concerns. When not in [place], walks on treadmill at inclines and with 3# weights.    No c/o edema, orthopnea, PND.     Noted between TIA 3/2023 and implant 9/2023, had 2 short episodes of gait imbalance similar to what he presented in 3/2023 with.     Continues to feel flecainide is a \"miracle drug.\" Rarely notes skipped beats. No recurrent AFib that he's aware of. Confirmed that ZIoPatch worn after TIA showed no recurrent AFib.      VITALS:  Vitals: /58   Pulse 52   Ht 1.753 m (5' 9\")   Wt 86.2 kg (190 lb)   SpO2 98%   BMI 28.06 kg/m      Diagnostic Testing:  EKG today, which I overread, showed SB 54 bpm with QRS duration 114 ms on flecainide 100 mg BID  EKG 6/2024 showed SB 50 bpm with QRS duration 114 ms on flecainide 100 mg BID  CT 9/2024 closed BENTON. Mild R/L dilation. CYNDI. Mild ao sclerosis  RUBÉN 5/2023 ([facility]) LVEF 55%. MV repair OK. Diffuse mild immobile atherosclerosis of desc thoracic aorta. Mod LAE. BENTON with partially excluded.   Component      Latest Ref Rng 6/19/2024  8:35 AM 11/11/2024  4:20 PM   Sodium      135 - 145 mmol/L 139  138    Potassium      3.4 - 5.3 mmol/L 4.6  4.5    Carbon Dioxide (CO2)      22 - 29 mmol/L 26  24    Anion Gap      7 - 15 mmol/L 10  9    Urea Nitrogen      8.0 - 23.0 mg/dL 22.6  19.8    Creatinine      0.67 - 1.17 mg/dL 1.24 (H)  1.19 (H)    GFR Estimate      >60 mL/min/1.73m2 59 (L)  62    Calcium      8.8 - 10.4 mg/dL 9.3  8.9    Chloride      98 - 107 mmol/L 103  105    Glucose      70 - 99 mg/dL 64 (L)  93         Plan:  Annual follow-up with me or Dr. Toure " "with updated echo      Assessment/Plan:    Paroxysmal AFib, AT  First noted many years ago 1996. On flecainide since 2017. Last intervention (RUBÉN/DCCV) 2018  Remains on unopposed flecainide d/t bradycardia  ZioPatch after TIA 2023 without AFib    EKG today looks great with stable intervals. No sxs with his known bradycardia  Reports no recurrent AFib/\"skips\" and flecainide is working well    CHADSVASc 6 (HTN, TIA, aortic plaquing, age). As above, s/p 20 mm Watchman at [facility] 9/2023 but recommended for indefinite AC given had TIA despite chronic AC and s/p partial exclusion of BENTON back in 2016. CBC 9/2024 with slight drop in Hgb 12.7 g/dL    PLAN:  Continue flecainide  Annual follow-up with in-office EKG  Updated Echo 1 year    HTN  Remains on losartan 50 mg BID  BPs look great  BMP 11/0224 wnl    PLAN:  Continue current medications    CAC  CTA Calcium score 6/2022 placed him in 15% percentile with total score 26.6  RUBÉN 5/2023 ([facility]) with nl LVEF 55%  Remains on Crestor 20 (dose unchanged at time of TIA). Last lipid panel 6/2024 with LDL 35 mg/dL    PLAN:  Continue current medications        Nakita Gayle PA-C, MSPAS      Orders Placed This Encounter   Procedures     Follow-Up with Cardiology TED     EKG 12-lead complete w/read - Clinics (performed today)     Echocardiogram Complete     No orders of the defined types were placed in this encounter.    There are no discontinued medications.      Encounter Diagnoses   Name Primary?     Paroxysmal atrial fibrillation (H)      H/O mitral valve repair Yes       CURRENT MEDICATIONS:  Current Outpatient Medications   Medication Sig Dispense Refill     acetaminophen (TYLENOL) 500 MG tablet Take 500 mg by mouth nightly as needed       amoxicillin (AMOXIL) 500 MG capsule TAKE 4 CAPSULES BY MOUTH 30 TO  60 MINUTES PRIOR TO DENTAL  PROCEDURE 12 capsule 1     Ascorbic Acid (VITAMIN C) 500 MG CAPS Take 500 mg by mouth 2 times daily       calcium citrate-vitamin D (CITRACAL) 200-6.25 " MG-MCG TABS per tablet 2 tablets daily       cholecalciferol 50 MCG (2000 UT) CAPS Take 1 capsule by mouth daily       ELIQUIS ANTICOAGULANT 5 MG tablet TAKE 1 TABLET BY MOUTH TWICE  DAILY 180 tablet 3     flecainide (TAMBOCOR) 100 MG tablet Take 1 tablet (100 mg) by mouth 2 times daily. 180 tablet 3     fluorouracil (EFUDEX) 5 % external cream Apply topically daily As needed for spots on scalp, per direction of dermatology       hydrocortisone (CORTAID) 0.5 % external cream Apply topically daily as needed       ketoconazole (NIZORAL) 2 % external cream Apply topically daily as needed for itching 60 g 3     ketoconazole (NIZORAL) 2 % external shampoo APPLY TO SCALP, LATHER, LEAVE IN PLACE FOR 5 MINUTES, THEN RINSE  OFF. USE ONCE EVERY 3 TO 4 DAYS  UP TO 8 WEEKS, THEN AS NEEDED AS DIRECTED 360 mL 0     latanoprost (XALATAN) 0.005 % ophthalmic solution Place 1 drop into both eyes daily       levothyroxine (SYNTHROID/LEVOTHROID) 100 MCG tablet TAKE 1 TABLET BY MOUTH DAILY 90 tablet 3     losartan (COZAAR) 50 MG tablet Take 1 tablet (50 mg) by mouth 2 times daily. 180 tablet 0     MAGNESIUM PO Take 500 mg by mouth daily       Melatonin 10 MG CAPS Take 5 mg by mouth daily.       mirtazapine (REMERON) 7.5 MG tablet TAKE 1 TABLET BY MOUTH AT  BEDTIME 90 tablet 3     Misc Natural Products (GLUCOSAMINE CHOND MSM FORMULA PO) Take 1 tablet by mouth daily  Contains Glucosamine 750 mg, Chrondroidtin 400 mg, , Vit D3 1,000 international unit(s)       Multiple Vitamins-Minerals (VITRUM 50+ SENIOR MULTI) TABS Take by mouth daily       omeprazole (PRILOSEC) 20 MG DR capsule TAKE 1 CAPSULE BY MOUTH DAILY 90 capsule 3     Potassium Gluconate 550 MG TABS Take 550 mg by mouth daily       rosuvastatin (CRESTOR) 20 MG tablet TAKE 1 TABLET BY MOUTH DAILY 90 tablet 1     tadalafil (CIALIS) 5 MG tablet Take 2 tablets (10 mg) by mouth daily as needed (Erectile dysfunction) 30 tablet 6     tretinoin (RETIN-A) 0.05 % external cream Apply  "topically At Bedtime       vitamin B-12 (CYANOCOBALAMIN) 500 MCG tablet Take 500 mcg by mouth daily         ALLERGIES     Allergies   Allergen Reactions     Amlodipine Swelling     Noted at 5 mg     Poison Ivy Extract Itching     Lisinopril Cough         Review of Systems:  Skin:  Negative     Eyes:  Positive for glasses  ENT:  Negative    Respiratory:  Negative for shortness of breath, dyspnea on exertion  Cardiovascular:  Negative for, palpitations, chest pain, fatigue, dizziness, edema    Gastroenterology: Negative for melena, hematochezia  Genitourinary:  Positive for prostate problem  Musculoskeletal:  Negative foot pain  Neurologic:  Negative    Psychiatric:  Negative    Heme/Lymph/Imm:  Positive for allergies  Endocrine:  Positive for thyroid disorder    Physical Exam:  Vitals: /58   Pulse 52   Ht 1.753 m (5' 9\")   Wt 86.2 kg (190 lb)   SpO2 98%   BMI 28.06 kg/m      Constitutional:  cooperative, alert and oriented, well developed, well nourished, in no acute distress appears younger than stated age      Skin:  warm and dry to the touch, no apparent skin lesions or masses noted        Head:  normocephalic, no masses or lesions        Eyes:           ENT:  no pallor or cyanosis        Neck:  JVP normal, no carotid bruit        Chest:  normal breath sounds, clear to auscultation, normal A-P diameter, normal symmetry, normal respiratory excursion, no use of accessory muscles        Cardiac: regular rhythm, normal S1/S2, no S3 or S4, apical impulse not displaced, no murmurs, gallops or rubs bradycardic                Abdomen:  abdomen soft        Vascular: pulses full and equal, no bruits auscultated                                      Extremities and Back:  no deformities, clubbing, cyanosis, erythema observed   Bilateral LE edema above sock, 1+    Neurological:  no gross motor deficits            PAST MEDICAL HISTORY:  Past Medical History:   Diagnosis Date     Antiplatelet or antithrombotic " "long-term use      Arrhythmia     A FIB     Atrial flutter (H)      BPH (benign prostatic hyperplasia)      Cerebral infarction (H) 3/19/23    \"Possible\" Brain TIA, further tests 2023.     ED (erectile dysfunction)      Elevated prostate specific antigen (PSA)      GERD (gastroesophageal reflux disease)      Heart murmur     MVP     History of blood transfusion 1996 [place] open heart surgery.    Repaired mitral valve.     History of skin cancer      Hyperlipidemia      Hypertension      Insomnia      Left inguinal hernia      Lentigo maligna melanoma (H)      MVP (mitral valve prolapse)      Nasal congestion      Obstructive sleep apnea     SEVERE    -  USES CPAP     Paroxysmal atrial fibrillation (H)      Schatzki's ring      Seborrheic dermatitis      Smoking history     quit 1973     Thyroid disease Partial thyroidectomy 2008, benign tumor.    Thyroid controlled by levothyroxine.     Urinary frequency        PAST SURGICAL HISTORY:  Past Surgical History:   Procedure Laterality Date     ABDOMEN SURGERY  Hernia right side 1994.  Prostatectomy [place] 5/20    Normal side effects.     BIOPSY  For various skin cancers, prostate.    Treatment completed at the time.     CARDIOVERSION  08/07/2017     COLONOSCOPY  2015     [facility] ([facility], [place], [place])     EGD, GASTROESOPHAGEAL REFLUX TEST WITH MUCOSAL PH ELECTRODE       ENT SURGERY  Partial parotidectomy 2004    Benign tumor.     GENITOURINARY SURGERY  Prostatectomy [place] 5/20.    Usual side effects.     HC KNEE SCOPE,MED/LAT MENISCUS REPAIR Left 2016     HERNIA REPAIR  1994     HERNIORRHAPHY INGUINAL Left 01/27/2021    Procedure: OPEN LEFT INGUINAL HERNIA REPAIR;  Surgeon: Bernard Caballero MD;  Location: [facility] OR     LASIK       Left atrial appendage ligation  1996     MR PROSTATE  W/O & W CONTRAST  2016, 2018     PAROTIDECTOMY  2004    partial parotidectomy     PROSTATECTOMY, RETROPUBIC, RADICAL, ROBOT-ASSISTED, LAP, USING DA LEONEL SI, W/PELVIC " LYMPHADENECTOMY  2020    s/p RALP in 2020 at [facility]. Path showed prostate cancer Sheeba 4+3=7     REPAIR VALVE MITRAL       SKIN CANCER SCREENING      Skin cancer surgeries-- basal on ear , melanoma on left sideburn , carcinoma right arm      THYROIDECTOMY      partial thyroidectomy       FAMILY HISTORY:  Family History   Problem Relation Age of Onset     Coronary Artery Disease Early Onset Father      Coronary Artery Disease Father         Problems age 50,  age 66.     Diabetes Brother         Since est age 50, overweight then.     Coronary Artery Disease Brother         AFib,etc.  [facility] pace maker/defibrulator     Diabetes Maternal Grandfather      Coronary Artery Disease Brother         Mitral valve repaire age 60     Other Cancer Sister          age 40,      Other Cancer Paternal Grandfather         Cancer of the mouth,  age 66.     Cerebrovascular Disease Paternal Grandfather         Age 66, passed 10 days     Hypertension Paternal Grandmother          10 days after a paralyzing stroke at age 66       SOCIAL HISTORY:  Social History     Socioeconomic History     Marital status:    Tobacco Use     Smoking status: Never     Smokeless tobacco: Never   Vaping Use     Vaping status: Never Used   Substance and Sexual Activity     Alcohol use: Yes     Comment: 3-4 ounces of venkat with water 2 per week.     Drug use: Never     Sexual activity: Yes     Partners: Female     Birth control/protection: Post-menopausal     Comment: ED from prostatectomy, work in progress.   Other Topics Concern     Parent/sibling w/ CABG, MI or angioplasty before 65F 55M? Yes     Comment: Father had heart attack age 50,  at 66.     Social Drivers of Health     Financial Resource Strain: Low Risk  (2024)    Financial Resource Strain      Within the past 12 months, have you or your family members you live with been unable to get utilities (heat, electricity) when it was really  needed?: No   Food Insecurity: No Food Insecurity (9/25/2024)    Received from [facility]    Hunger Vital Sign      Worried About Running Out of Food in the Last Year: Never true      Ran Out of Food in the Last Year: Never true   Transportation Needs: No Transportation Needs (9/25/2024)    Received from [facility]    PRAPARE - Transportation      Lack of Transportation (Medical): No      Lack of Transportation (Non-Medical): No   Physical Activity: Sufficiently Active (9/25/2024)    Received from [facility]    Exercise Vital Sign      Days of Exercise per Week: 5 days      Minutes of Exercise per Session: 30 min   Stress: Stress Concern Present (6/14/2024)    [employer] [employer] - Occupational Stress Questionnaire      Feeling of Stress : Rather much   Social Connections: Unknown (6/14/2024)    Social Connection and Isolation Panel [NHANES]      Frequency of Social Gatherings with Friends and Family: Twice a week   Interpersonal Safety: Low Risk  (6/19/2024)    Interpersonal Safety      Do you feel physically and emotionally safe where you currently live?: Yes      Within the past 12 months, have you been hit, slapped, kicked or otherwise physically hurt by someone?: No      Within the past 12 months, have you been humiliated or emotionally abused in other ways by your partner or ex-partner?: No   Housing Stability: Low Risk  (9/25/2024)    Received from [facility]    Housing Stability      What is your living situation today?: I have a steady place to live             Thank you for allowing me to participate in the care of your patient.      Sincerely,     Sera Gayle PA-C     [facility]  cc:   Sera Gayle PA-C  [address] [address]

## 2025-02-24 DIAGNOSIS — I10 BENIGN ESSENTIAL HYPERTENSION: ICD-10-CM

## 2025-02-24 RX ORDER — LOSARTAN POTASSIUM 50 MG/1
50 TABLET ORAL 2 TIMES DAILY
Qty: 180 TABLET | Refills: 1 | Status: SHIPPED | OUTPATIENT
Start: 2025-02-24

## 2025-03-19 ENCOUNTER — TELEPHONE (OUTPATIENT)
Dept: UROLOGY | Facility: CLINIC | Age: 80
End: 2025-03-19
Payer: MEDICARE

## 2025-03-19 NOTE — TELEPHONE ENCOUNTER
You have an appointment currently scheduled with Arlen Abel on 5/20.  She requested an in person visit but will only be doing virtual visits at that time.  You need to call and reschedule your appointment or convert it to a virtual visit.      Please use your MyChart or call [phone] to reschedule this appointment at your earliest convenience.    We apologize for any inconvenience this may cause.    We look forward to seeing you at your upcoming appointment and thank you for choosing [facility].    Thank you for trusting us with your care.    Sincerely, [facility]

## 2025-03-23 ENCOUNTER — HEALTH MAINTENANCE LETTER (OUTPATIENT)
Age: 80
End: 2025-03-23

## 2025-05-25 DIAGNOSIS — E78.5 HYPERLIPIDEMIA, UNSPECIFIED HYPERLIPIDEMIA TYPE: ICD-10-CM

## 2025-05-27 RX ORDER — ROSUVASTATIN CALCIUM 20 MG/1
20 TABLET, COATED ORAL DAILY
Qty: 90 TABLET | Refills: 1 | Status: SHIPPED | OUTPATIENT
Start: 2025-05-27

## 2025-06-09 ENCOUNTER — RESULTS FOLLOW-UP (OUTPATIENT)
Dept: CARDIOLOGY | Facility: CLINIC | Age: 80
End: 2025-06-09

## 2025-06-09 ENCOUNTER — TELEPHONE (OUTPATIENT)
Dept: CARDIOLOGY | Facility: CLINIC | Age: 80
End: 2025-06-09

## 2025-06-09 ENCOUNTER — ALLIED HEALTH/NURSE VISIT (OUTPATIENT)
Dept: CARDIOLOGY | Facility: CLINIC | Age: 80
End: 2025-06-09
Payer: MEDICARE

## 2025-06-09 DIAGNOSIS — I48.0 PAROXYSMAL ATRIAL FIBRILLATION (H): Primary | ICD-10-CM

## 2025-06-09 PROCEDURE — 93000 ELECTROCARDIOGRAM COMPLETE: CPT | Performed by: PHYSICIAN ASSISTANT

## 2025-06-09 RX ORDER — METOPROLOL TARTRATE 25 MG/1
25 TABLET, FILM COATED ORAL 2 TIMES DAILY PRN
Qty: 30 TABLET | Refills: 1 | Status: SHIPPED | OUTPATIENT
Start: 2025-06-09

## 2025-06-09 NOTE — PROGRESS NOTES
Patient walked into clinic today with complaints of Afib.  EP nurse was consulted.  EKG performed at RN's request.  Results give to EP Nurse for review.    [facility]A([facility])

## 2025-06-09 NOTE — TELEPHONE ENCOUNTER
See Telephone Note 6/9/2025  Recommend reducing EtOH to <=2 drinks/day    Marcial Mace  June 9, 2025 at 12:59 PM

## 2025-06-09 NOTE — TELEPHONE ENCOUNTER
Called and spoke with pt regarding Pavithra recommendations. Pt is agreeable to plan and will go  prescription for prn metoprolol and continue to update us. We will plan to check in with pt in AM for update. Pt verbalized understanding and denied further questions at this time.   KAIN Block

## 2025-06-09 NOTE — TELEPHONE ENCOUNTER
Pt presented to clinic as a walk-in to  stating that he is in afib. Spoke with pt who states he was on the treadmill this AM when he felt sob and like he went into afib  for the first time in many years. Now continues to have sob with exertion and palpitations. Has been on flecainide bid for 10 years which normally works great for him. Denies any known or obvious triggers for episode. Amb EKG done at clinic showing possible afib with rate of 108. Will send update to Nakita Gayle PA-C and get back to pt on any recommendations. Advised pt to be evaluated in ER if symptoms worsen or HR increases. Pt verbalized understanding and denied further questions at this time.   KAIN Block

## 2025-06-09 NOTE — TELEPHONE ENCOUNTER
Yes, looks like AFib with mild RVR.    Continue flecainide 100 mg BID  Pls send in metoprolol tartrate 25 mg tabs (I pended) - can take 1 PRN now and again before bed if still in AFib. He's not typically kept on BB therapy d/t SB.    Pls touch base with him tomorrow - if remains in AFib would like to set up DCCV with H/P first vs increasing flecainide.    Marcial Mace  June 9, 2025 at 12:50 PM

## 2025-06-10 NOTE — TELEPHONE ENCOUNTER
6/10/25 See result follow up note from Nakita Gayle PA-C from yesterday stating pt had converted yesterday about 530pm  KHLara 1122 am

## 2025-06-17 ENCOUNTER — ANCILLARY PROCEDURE (OUTPATIENT)
Dept: BONE DENSITY | Facility: CLINIC | Age: 80
End: 2025-06-17
Attending: INTERNAL MEDICINE
Payer: MEDICARE

## 2025-06-17 DIAGNOSIS — M85.88 OTHER SPECIFIED DISORDERS OF BONE DENSITY AND STRUCTURE, OTHER SITE: ICD-10-CM

## 2025-06-17 DIAGNOSIS — M85.80 OSTEOPENIA, UNSPECIFIED LOCATION: ICD-10-CM

## 2025-06-17 PROCEDURE — 77080 DXA BONE DENSITY AXIAL: CPT | Mod: TC | Performed by: PHYSICIAN ASSISTANT

## 2025-06-18 ENCOUNTER — RESULTS FOLLOW-UP (OUTPATIENT)
Dept: FAMILY MEDICINE | Facility: CLINIC | Age: 80
End: 2025-06-18

## 2025-06-20 SDOH — HEALTH STABILITY: PHYSICAL HEALTH: ON AVERAGE, HOW MANY DAYS PER WEEK DO YOU ENGAGE IN MODERATE TO STRENUOUS EXERCISE (LIKE A BRISK WALK)?: 4 DAYS

## 2025-06-20 SDOH — HEALTH STABILITY: PHYSICAL HEALTH: ON AVERAGE, HOW MANY MINUTES DO YOU ENGAGE IN EXERCISE AT THIS LEVEL?: 30 MIN

## 2025-06-20 ASSESSMENT — SOCIAL DETERMINANTS OF HEALTH (SDOH): HOW OFTEN DO YOU GET TOGETHER WITH FRIENDS OR RELATIVES?: ONCE A WEEK

## 2025-06-25 ENCOUNTER — OFFICE VISIT (OUTPATIENT)
Dept: FAMILY MEDICINE | Facility: CLINIC | Age: 80
End: 2025-06-25
Payer: MEDICARE

## 2025-06-25 ENCOUNTER — RESULTS FOLLOW-UP (OUTPATIENT)
Dept: FAMILY MEDICINE | Facility: CLINIC | Age: 80
End: 2025-06-25

## 2025-06-25 VITALS
BODY MASS INDEX: 27.55 KG/M2 | WEIGHT: 181.8 LBS | RESPIRATION RATE: 18 BRPM | TEMPERATURE: 97.8 F | OXYGEN SATURATION: 93 % | SYSTOLIC BLOOD PRESSURE: 121 MMHG | HEART RATE: 52 BPM | HEIGHT: 68 IN | DIASTOLIC BLOOD PRESSURE: 63 MMHG

## 2025-06-25 DIAGNOSIS — Z79.01 LONG TERM (CURRENT) USE OF ANTICOAGULANTS: ICD-10-CM

## 2025-06-25 DIAGNOSIS — N17.9 AKI (ACUTE KIDNEY INJURY): Primary | ICD-10-CM

## 2025-06-25 DIAGNOSIS — E03.9 HYPOTHYROIDISM, UNSPECIFIED TYPE: ICD-10-CM

## 2025-06-25 DIAGNOSIS — I10 PRIMARY HYPERTENSION: ICD-10-CM

## 2025-06-25 DIAGNOSIS — Z00.00 ENCOUNTER FOR MEDICARE ANNUAL WELLNESS EXAM: Primary | ICD-10-CM

## 2025-06-25 DIAGNOSIS — Z00.00 ANNUAL WELLNESS VISIT: ICD-10-CM

## 2025-06-25 DIAGNOSIS — Z86.73 HISTORY OF TRANSIENT ISCHEMIC ATTACK: ICD-10-CM

## 2025-06-25 DIAGNOSIS — E78.5 DYSLIPIDEMIA: ICD-10-CM

## 2025-06-25 DIAGNOSIS — G47.33 OBSTRUCTIVE SLEEP APNEA: ICD-10-CM

## 2025-06-25 DIAGNOSIS — N18.31 CHRONIC KIDNEY DISEASE, STAGE 3A (H): ICD-10-CM

## 2025-06-25 DIAGNOSIS — I48.0 PAROXYSMAL ATRIAL FIBRILLATION (H): ICD-10-CM

## 2025-06-25 DIAGNOSIS — Z13.6 SCREENING FOR CARDIOVASCULAR CONDITION: ICD-10-CM

## 2025-06-25 DIAGNOSIS — Z13.21 ENCOUNTER FOR VITAMIN DEFICIENCY SCREENING: ICD-10-CM

## 2025-06-25 DIAGNOSIS — R35.0 URINARY FREQUENCY: ICD-10-CM

## 2025-06-25 DIAGNOSIS — K22.2 SCHATZKI'S RING: ICD-10-CM

## 2025-06-25 DIAGNOSIS — C61 MALIGNANT NEOPLASM OF PROSTATE (H): ICD-10-CM

## 2025-06-25 DIAGNOSIS — R00.1 BRADYCARDIA: ICD-10-CM

## 2025-06-25 DIAGNOSIS — M89.9 DISORDER OF BONE, UNSPECIFIED: ICD-10-CM

## 2025-06-25 LAB
ALBUMIN SERPL BCG-MCNC: 3.9 G/DL (ref 3.5–5.2)
ALP SERPL-CCNC: 57 U/L (ref 40–150)
ALT SERPL W P-5'-P-CCNC: 23 U/L (ref 0–70)
ANION GAP SERPL CALCULATED.3IONS-SCNC: 10 MMOL/L (ref 7–15)
AST SERPL W P-5'-P-CCNC: 26 U/L (ref 0–45)
BILIRUB SERPL-MCNC: 0.6 MG/DL
BUN SERPL-MCNC: 24.6 MG/DL (ref 8–23)
CALCIUM SERPL-MCNC: 9.1 MG/DL (ref 8.8–10.4)
CHLORIDE SERPL-SCNC: 106 MMOL/L (ref 98–107)
CHOLEST SERPL-MCNC: 115 MG/DL
CREAT SERPL-MCNC: 1.39 MG/DL (ref 0.67–1.17)
EGFRCR SERPLBLD CKD-EPI 2021: 51 ML/MIN/1.73M2
ERYTHROCYTE [DISTWIDTH] IN BLOOD BY AUTOMATED COUNT: 12.7 % (ref 10–15)
FASTING STATUS PATIENT QL REPORTED: YES
FASTING STATUS PATIENT QL REPORTED: YES
GLUCOSE SERPL-MCNC: 98 MG/DL (ref 70–99)
HCO3 SERPL-SCNC: 26 MMOL/L (ref 22–29)
HCT VFR BLD AUTO: 40.7 % (ref 40–53)
HDLC SERPL-MCNC: 58 MG/DL
HGB BLD-MCNC: 13.3 G/DL (ref 13.3–17.7)
LDLC SERPL CALC-MCNC: 46 MG/DL
MAGNESIUM SERPL-MCNC: 2.2 MG/DL (ref 1.7–2.3)
MCH RBC QN AUTO: 30.2 PG (ref 26.5–33)
MCHC RBC AUTO-ENTMCNC: 32.7 G/DL (ref 31.5–36.5)
MCV RBC AUTO: 92 FL (ref 78–100)
NONHDLC SERPL-MCNC: 57 MG/DL
PLATELET # BLD AUTO: 198 10E3/UL (ref 150–450)
POTASSIUM SERPL-SCNC: 4.5 MMOL/L (ref 3.4–5.3)
PROT SERPL-MCNC: 6.5 G/DL (ref 6.4–8.3)
PSA SERPL DL<=0.01 NG/ML-MCNC: <0.01 NG/ML
RBC # BLD AUTO: 4.41 10E6/UL (ref 4.4–5.9)
SODIUM SERPL-SCNC: 142 MMOL/L (ref 135–145)
TRIGL SERPL-MCNC: 56 MG/DL
TSH SERPL DL<=0.005 MIU/L-ACNC: 2 UIU/ML (ref 0.3–4.2)
VIT B12 SERPL-MCNC: 1086 PG/ML (ref 232–1245)
VIT D+METAB SERPL-MCNC: 50 NG/ML (ref 20–50)
WBC # BLD AUTO: 6.2 10E3/UL (ref 4–11)

## 2025-06-25 PROCEDURE — 85027 COMPLETE CBC AUTOMATED: CPT | Performed by: INTERNAL MEDICINE

## 2025-06-25 PROCEDURE — 84443 ASSAY THYROID STIM HORMONE: CPT | Performed by: INTERNAL MEDICINE

## 2025-06-25 PROCEDURE — 1126F AMNT PAIN NOTED NONE PRSNT: CPT | Performed by: INTERNAL MEDICINE

## 2025-06-25 PROCEDURE — 84153 ASSAY OF PSA TOTAL: CPT | Performed by: INTERNAL MEDICINE

## 2025-06-25 PROCEDURE — 3048F LDL-C <100 MG/DL: CPT | Performed by: INTERNAL MEDICINE

## 2025-06-25 PROCEDURE — 80053 COMPREHEN METABOLIC PANEL: CPT | Performed by: INTERNAL MEDICINE

## 2025-06-25 PROCEDURE — 3074F SYST BP LT 130 MM HG: CPT | Performed by: INTERNAL MEDICINE

## 2025-06-25 PROCEDURE — 83735 ASSAY OF MAGNESIUM: CPT | Performed by: INTERNAL MEDICINE

## 2025-06-25 PROCEDURE — 80061 LIPID PANEL: CPT | Performed by: INTERNAL MEDICINE

## 2025-06-25 PROCEDURE — 82306 VITAMIN D 25 HYDROXY: CPT | Performed by: INTERNAL MEDICINE

## 2025-06-25 PROCEDURE — 99215 OFFICE O/P EST HI 40 MIN: CPT | Mod: 25 | Performed by: INTERNAL MEDICINE

## 2025-06-25 PROCEDURE — 36415 COLL VENOUS BLD VENIPUNCTURE: CPT | Performed by: INTERNAL MEDICINE

## 2025-06-25 PROCEDURE — 82607 VITAMIN B-12: CPT | Performed by: INTERNAL MEDICINE

## 2025-06-25 PROCEDURE — 3078F DIAST BP <80 MM HG: CPT | Performed by: INTERNAL MEDICINE

## 2025-06-25 PROCEDURE — G0439 PPPS, SUBSEQ VISIT: HCPCS | Performed by: INTERNAL MEDICINE

## 2025-06-25 ASSESSMENT — PAIN SCALES - GENERAL: PAINLEVEL_OUTOF10: NO PAIN (0)

## 2025-06-25 NOTE — PATIENT INSTRUCTIONS
Based on our discussion, I have outlined the following instructions for you:      - Stop taking metoprolol completely.  - You have a heart doctor appointment on July 17, 2025.  - Stop taking metoprolol completely and keep an eye on your heart rate.  - If needed, you can take half a tablet in the future.  - Think about getting a Reclast injection once a year because taking pills for your bones might not be safe for your throat.  - You have a follow-up appointment with Melia Mccabe.  - Visit your prostate doctor once a year.  - Keep taking your current dose of levothyroxine for your thyroid.  - Keep taking Eliquis.  - Avoid taking pills for your bones that might irritate your throat.    Thank you again for your visit, and we look forward to supporting you in your journey to better health.   Patient Education   Preventive Care Advice   This is general advice given by our system to help you stay healthy. However, your care team may have specific advice just for you. Please talk to your care team about your preventive care needs.  Nutrition  Eat 5 or more servings of fruits and vegetables each day.  Try wheat bread, brown rice and whole grain pasta (instead of white bread, rice, and pasta).  Get enough calcium and vitamin D. Check the label on foods and aim for 100% of the RDA (recommended daily allowance).  Lifestyle  Exercise at least 150 minutes each week  (30 minutes a day, 5 days a week).  Do muscle strengthening activities 2 days a week. These help control your weight and prevent disease.  No smoking.  Wear sunscreen to prevent skin cancer.  Have a dental exam and cleaning every 6 months.  Yearly exams  See your health care team every year to talk about:  Any changes in your health.  Any medicines your care team has prescribed.  Preventive care, family planning, and ways to prevent chronic diseases.  Shots (vaccines)   HPV shots (up to age 26), if you've never had them before.  Hepatitis B shots (up to age 59), if  you've never had them before.  COVID-19 shot: Get this shot when it's due.  Flu shot: Get a flu shot every year.  Tetanus shot: Get a tetanus shot every 10 years.  Pneumococcal, hepatitis A, and RSV shots: Ask your care team if you need these based on your risk.  Shingles shot (for age 50 and up)  General health tests  Diabetes screening:  Starting at age 35, Get screened for diabetes at least every 3 years.  If you are younger than age 35, ask your care team if you should be screened for diabetes.  Cholesterol test: At age 39, start having a cholesterol test every 5 years, or more often if advised.  Bone density scan (DEXA): At age 50, ask your care team if you should have this scan for osteoporosis (brittle bones).  Hepatitis C: Get tested at least once in your life.  STIs (sexually transmitted infections)  Before age 24: Ask your care team if you should be screened for STIs.  After age 24: Get screened for STIs if you're at risk. You are at risk for STIs (including HIV) if:  You are sexually active with more than one person.  You don't use condoms every time.  You or a partner was diagnosed with a sexually transmitted infection.  If you are at risk for HIV, ask about PrEP medicine to prevent HIV.  Get tested for HIV at least once in your life, whether you are at risk for HIV or not.  Cancer screening tests  Cervical cancer screening: If you have a cervix, begin getting regular cervical cancer screening tests starting at age 21.  Breast cancer scan (mammogram): If you've ever had breasts, begin having regular mammograms starting at age 40. This is a scan to check for breast cancer.  Colon cancer screening: It is important to start screening for colon cancer at age 45.  Have a colonoscopy test every 10 years (or more often if you're at risk) Or, ask your provider about stool tests like a FIT test every year or Cologuard test every 3 years.  To learn more about your testing options, visit:   .  For help making a  decision, visit:   https://bit.ly/pf21459.  Prostate cancer screening test: If you have a prostate, ask your care team if a prostate cancer screening test (PSA) at age 55 is right for you.  Lung cancer screening: If you are a current or former smoker ages 50 to 80, ask your care team if ongoing lung cancer screenings are right for you.  For informational purposes only. Not to replace the advice of your health care provider. Copyright   2023 [employer]. All rights reserved. Clinically reviewed by the  [employer] [facility]. [employer] 791616 - REV 01/24.  Learning About Activities of Daily Living  What are activities of daily living?     Activities of daily living (ADLs) are the basic self-care tasks you do every day. These include eating, bathing, dressing, and moving around.  As you age, and if you have health problems, you may find that it's harder to do some of these tasks. If so, your doctor can suggest ideas that may help.  To measure what kind of help you may need, your doctor will ask how well you are able to do ADLs. Let your doctor know if there are any tasks that you are having trouble doing. This is an important first step to getting help. And when you have the help you need, you can stay as independent as possible.  How will a doctor assess your ADLs?  Asking about ADLs is part of a routine health checkup your doctor will likely do as you age. Your health check might be done in a doctor's office, in your home, or at a hospital. The goal is to find out if you are having any problems that could make it hard to care for yourself or that make it unsafe for you to be on your own.  To measure your ADLs, your doctor will ask how hard it is for you to do routine tasks. Your doctor may also want to know if you have changed the way you do a task because of a health problem. Your doctor may watch how you:  Walk back and forth.  Keep your balance while you stand or walk.  Move from  sitting to standing or from a bed to a chair.  Button or unbutton a shirt or sweater.  Remove and put on your shoes.  It's common to feel a little worried or anxious if you find you can't do all the things you used to be able to do. Talking with your doctor about ADLs is a way to make sure you're as safe as possible and able to care for yourself as well as you can. You may want to bring a caregiver, friend, or family member to your checkup. They can help you talk to your doctor.  Follow-up care is a key part of your treatment and safety. Be sure to make and go to all appointments, and call your doctor if you are having problems. It's also a good idea to know your test results and keep a list of the medicines you take.  Current as of: October 24, 2024  Content Version: 14.5    4821-4018 [employer].   Care instructions adapted under license by your healthcare professional. If you have questions about a medical condition or this instruction, always ask your healthcare professional. [employer] disclaims any warranty or liability for your use of this information.    Hearing Loss: Care Instructions  Overview     Hearing loss is a sudden or slow decrease in how well you hear. It can range from slight to profound. Permanent hearing loss can occur with aging. It also can happen when you are exposed long-term to loud noise. Examples include listening to loud music, riding motorcycles, or being around other loud machines.  Hearing loss can affect your work and home life. It can make you feel lonely or depressed. You may feel that you have lost your independence. But hearing aids and other devices can help you hear better and feel connected to others.  Follow-up care is a key part of your treatment and safety. Be sure to make and go to all appointments, and call your doctor if you are having problems. It's also a good idea to know your test results and keep a list of the medicines you take.  How can you  care for yourself at home?  Avoid loud noises whenever possible. This helps keep your hearing from getting worse.  Always wear hearing protection around loud noises.  Wear a hearing aid as directed.  A professional can help you pick a hearing aid that will work best for you.  You can also get hearing aids over the counter for mild to moderate hearing loss.  Have hearing tests as your doctor suggests. They can show whether your hearing has changed. Your hearing aid may need to be adjusted.  Use other devices as needed. These may include:  Telephone amplifiers and hearing aids that can connect to a television, stereo, radio, or microphone.  Devices that use lights or vibrations. These alert you to the doorbell, a ringing telephone, or a baby monitor.  Television closed-captioning. This shows the words at the bottom of the screen. Most new TVs can do this.  TTY (text telephone). This lets you type messages back and forth on the telephone instead of talking or listening. These devices are also called TDD. When messages are typed on the keyboard, they are sent over the phone line to a receiving TTY. The message is shown on a monitor.  Use text messaging, social media, and email if it is hard for you to communicate by telephone.  Try to learn a listening technique called speechreading. It is not lipreading. You pay attention to people's gestures, expressions, posture, and tone of voice. These clues can help you understand what a person is saying. Face the person you are talking to, and have them face you. Make sure the lighting is good. You need to see the other person's face clearly.  Think about counseling if you need help to adjust to your hearing loss.  When should you call for help?  Watch closely for changes in your health, and be sure to contact your doctor if:    You think your hearing is getting worse.     You have new symptoms, such as dizziness or nausea.   Where can you learn more?  Go to  "https://www.[employer].net/patiented  Enter R798 in the search box to learn more about \"Hearing Loss: Care Instructions.\"  Current as of: October 27, 2024  Content Version: 14.5 2024-2025 [employer].   Care instructions adapted under license by your healthcare professional. If you have questions about a medical condition or this instruction, always ask your healthcare professional. [employer] disclaims any warranty or liability for your use of this information.    Bladder Training: Care Instructions  Your Care Instructions     Bladder training is used to treat urge incontinence and stress incontinence. Urge incontinence means that the need to urinate comes on so fast that you can't get to a toilet in time. Stress incontinence means that you leak urine because of pressure on your bladder. For example, it may happen when you laugh, cough, or lift something heavy.  Bladder training can increase how long you can wait before you have to urinate. It can also help your bladder hold more urine. And it can give you better control over the urge to urinate.  It is important to remember that bladder training takes a few weeks to a few months to make a difference. You may not see results right away, but don't give up.  Follow-up care is a key part of your treatment and safety. Be sure to make and go to all appointments, and call your doctor if you are having problems. It's also a good idea to know your test results and keep a list of the medicines you take.  How can you care for yourself at home?  Work with your doctor to come up with a bladder training program that is right for you. You may use one or more of the following methods.  Delayed urination  In the beginning, try to keep from urinating for 5 minutes after you first feel the need to go.  While you wait, take deep, slow breaths to relax. Kegel exercises can also help you delay the need to go to the bathroom.  After some practice, when you can " "easily wait 5 minutes to urinate, try to wait 10 minutes before you urinate.  Slowly increase the waiting period until you are able to control when you have to urinate.  Scheduled urination  Empty your bladder when you first wake up in the morning.  Schedule times throughout the day when you will urinate.  Start by going to the bathroom every hour, even if you don't need to go.  Slowly increase the time between trips to the bathroom.  When you have found a schedule that works well for you, keep doing it.  If you wake up during the night and have to urinate, do it. Apply your schedule to waking hours only.  Kegel exercises  These tighten and strengthen pelvic muscles, which can help you control the flow of urine. (If doing these exercises causes pain, stop doing them and talk with your doctor.) To do Kegel exercises:  Squeeze your muscles as if you were trying not to pass gas. Or squeeze your muscles as if you were stopping the flow of urine. Your belly, legs, and buttocks shouldn't move.  Hold the squeeze for 3 seconds, then relax for 5 to 10 seconds.  Start with 3 seconds, then add 1 second each week until you are able to squeeze for 10 seconds.  Repeat the exercise 10 times a session. Do 3 to 8 sessions a day.  When should you call for help?  Watch closely for changes in your health, and be sure to contact your doctor if:    Your incontinence is getting worse.     You do not get better as expected.   Where can you learn more?  Go to https://www.[employer].net/patiented  Enter V684 in the search box to learn more about \"Bladder Training: Care Instructions.\"  Current as of: April 30, 2024  Content Version: 14.5    4745-5921 [employer].   Care instructions adapted under license by your healthcare professional. If you have questions about a medical condition or this instruction, always ask your healthcare professional. [employer] disclaims any warranty or liability for your use of this " information.

## 2025-06-25 NOTE — PROGRESS NOTES
Preventive Care Visit  [facility]  Edu Reyes MD, Internal Medicine  Jun 25, 2025      Assessment & Plan   Problem List Items Addressed This Visit       Paroxysmal atrial fibrillation (H)    Obstructive sleep apnea    Hypertension    Relevant Orders    Comprehensive metabolic panel (BMP + Alb, Alk Phos, ALT, AST, Total. Bili, TP) (Completed)    CBC with platelets (Completed)    Magnesium (Completed)    Albumin Random Urine Quantitative with Creat Ratio    UA with Microscopic reflex to Culture - lab collect    Urinary frequency    Relevant Orders    UA with Microscopic reflex to Culture - lab collect    Schatzki's ring    Hypothyroidism    Relevant Orders    TSH with free T4 reflex (Completed)    History of transient ischemic attack    Long term (current) use of anticoagulants    Dyslipidemia    Relevant Orders    Lipid panel reflex to direct LDL Fasting (Completed)    Malignant neoplasm of prostate s/p Davinci prostatectomy May 2020  (H)    Relevant Orders    PSA, tumor marker (Completed)    Chronic kidney disease, stage 3a (H)     Other Visit Diagnoses         Encounter for Medicare annual wellness exam    -  Primary      Annual wellness visit          Screening for cardiovascular condition          Encounter for vitamin deficiency screening        Relevant Orders    Vitamin B12 (Completed)    Vitamin D Deficiency (Completed)      Disorder of bone, unspecified        Relevant Orders    Vitamin D Deficiency (Completed)      Bradycardia                 Assessment & Plan  Paroxysmal atrial fibrillation:  - Atrial fibrillation triggered by physical exertion. Managed with metoprolol, but causes bradycardia.  - Stop metoprolol completely due to low heart rate. Follow-up with cardiology scheduled for July 17, 2025.    Bradycardia:  - Bradycardia observed with metoprolol use, heart rate drops to 40.  - Stop metoprolol completely. Monitor heart rate and consider half tablet if  needed.    Osteoporosis:  - Recent bone density test confirms osteoporosis.  - Consider annual injection like Reclast due to esophagus issues preventing pill use. Follow-up with Melia Mccabe scheduled for July 17, 2025.    Obstructive sleep apnea:  - Sleep apnea potentially exacerbated by alcohol consumption.  - Monitor alcohol intake.    Hypothyroidism:  - Long-term hypothyroidism managed with levothyroxine.  - Continue current dose of levothyroxine.      Assessment & Plan  Paroxysmal atrial fibrillation:  Atrial fibrillation triggered by physical exertion, managed with metoprolol. Patient experienced low heart rate (bradycardia) with metoprolol use.  Stop metoprolol completely due to bradycardia. Follow-up with cardiology scheduled for July 17, 2025.    Bradycardia:  Bradycardia likely due to metoprolol use, with heart rate dropping to 40 bpm.  Stop metoprolol completely and monitor heart rate. Consider half a tablet if needed in the future.    Osteoporosis:  Recent bone density test confirms osteoporosis;  bone density test indicates osteoporosis with a 3.4% hip fracture risk.  Consider annual Reclast injection due to esophagus issues that contraindicate oral bisphosphonates. Follow-up with Dr Melia Jaime is scheduled.    Malignant neoplasm of prostate:  History of prostate cancer, no current treatment for prostate.  Annual urologist visits.     Hypothyroidism:  Long-standing hypothyroidism managed with levothyroxine.  Continue current dose of levothyroxine.    History of transient ischemic attack:  History of TIA, managed with Watchman implant and Eliquis.  Continue Eliquis.    Schatzki's ring:  History of Schatzki's ring with previous dilation.  Avoid oral bisphosphonates due to risk of esophagitis.    Patient has been advised of split billing requirements and indicates understanding: Yes       BMI  Estimated body mass index is 27.39 kg/m  as calculated from the following:    Height as of this encounter: 1.735  "m (5' 8.31\").    Weight as of this encounter: 82.5 kg (181 lb 12.8 oz).   Weight management plan: Discussed healthy diet and exercise guidelines  Reviewed preventive health counseling, as reflected in patient instructions       Regular exercise       Healthy diet/nutrition       Vision screening       Hearing screening       Immunizations  Vaccines up to date.  Routine vaccine counseling provided.           Alcohol Use        Osteoporosis prevention/bone health       Advance Care Planning  Counseling  Appropriate preventive services were addressed with this patient via screening, questionnaire, or discussion as appropriate for fall prevention, nutrition, physical activity, Tobacco-use cessation, social engagement, weight loss and cognition.  Checklist reviewing preventive services available has been given to the patient.  Reviewed patient's diet, addressing concerns and/or questions.   Patient reported safety concerns were addressed today.The patient was provided with written information regarding signs of hearing loss.   Information on urinary incontinence and treatment options given to patient.           Monica Soni is a 80 year old, presenting for the following:  Physical        6/19/2023     8:31 AM   Additional Questions   Roomed by Antoinette RAMIREZ    History of Present Illness-  Zachariah Ram, age: 80 years    Atrial Fibrillation  - Experienced atrial flutter triggered by treadmill exercise on June 9, 2025, leading to atrial fibrillation  - Underwent EKG and was prescribed metoprolol, which restored sinus rhythm after one dose  - Experienced similar symptoms yesterday, took metoprolol again, and returned to sinus rhythm  - Reports low pulse rate of 40 when taking metoprolol, causing dizziness and near fainting  - Currently on Eliquis due to uncertainty about the origin of a blood clot  - Also taking flecainide, which maintains pulse rate in the 50s    Osteoporosis  - Previously diagnosed " with osteopenia, recent test confirmed osteoporosis  - Bone density test conducted last week at [facility], showing hip fracture risk of 3.4%    Thyroid Issues  - Has had thyroid problems for 15 years  - Underwent partial thyroidectomy  - On consistent thyroid medication dosage for 15 years    Skin Cancer History  - History of melanoma 13 years ago, surgically removed from left sideburn  - No recurrence of melanoma    Prostate Health  - Underwent prostatectomy, leading to occasional urine leakage during quick movements like golf swings  - Wears a mini pad during golfing    Sleep and Respiratory Health  - Uses CPAP regularly  - Reports mild nasal congestion  - Takes mirtazapine for sleep, improving sleep duration by approximately one hour    Hearing  - Has been using hearing aids for two years, improving television watching and group conversation    Back Pain  - Experiences morning back soreness, relieved by stretching    Social History  - Smoked in college, does not smoke currently  - Consumes alcohol moderately    Misc  - Reports stable weight, possibly slightly increased  - No significant musculoskeletal tenderness or swelling in extremities  - Regular bowel movements  - No claudication or dizziness upon standing      History of Present Illness-  Zachariah Ram, age: 80 years    Atrial Fibrillation  On June 9, 2025, experienced atrial flutter triggered by treadmill exercise, leading to atrial fibrillation (A-fib). An EKG was performed at cardiology, and metoprolol was prescribed. Took one pill of metoprolol, which restored sinus rhythm. Experienced a recurrence of A-fib the day before the current encounter, took metoprolol again, which resolved the issue. Reports a heart rate of about 40 when taking metoprolol, causing concern for dizziness and potential fainting. Currently on Eliquis due to uncertainty about the origin of a previous blood clot. Also taking flecainide, which maintains pulse rate in the 50s.  Atrial  Fibrillation  On June 9th, Zachariah experienced atrial flutter triggered by treadmill exercise, which led to atrial fibrillation. He visited cardiology, underwent an EKG, and was prescribed metoprolol. He took one pill, which restored sinus rhythm. Yesterday, he experienced atrial fibrillation again and took metoprolol, which resolved the issue. He reports that metoprolol lowers his pulse to about 40, causing dizziness and near fainting. He is currently on Eliquis due to uncertainty about the origin of a blood clot and is also taking flecainide, which maintains his pulse in the 50s.    Osteoporosis  Previously diagnosed with osteopenia, recent tests indicate progression to osteoporosis. A bone density test was conducted last week, showing a 3.4% risk of hip fracture.    Thyroid Issues  Has had thyroid problems for 15 years, managed with the same dose of levothyroxine. Underwent a partial thyroidectomy in the past.      Skin Cancer History  Zachariah has a history of skin cancer, including melanoma 13 years ago, which was surgically removed from his left sideburn. The melanoma was in the early stage and has not recurred. He has had a fair amount of skin cancer over the years and sees a skin doctor every six months.    Prostate and Urinary Issues  Prostate Cancer History  Zachariah underwent prostatectomy and does not take any medication for his prostate currently. He experiences occasional urine leakage during quick movements, such as a golf swing, and uses a mini pad for golfing.    Hearing and Vision  Has been using hearing aids for two years, which improve hearing, especially in group conversations. Scheduled for a cataract surgery consultation, likely to occur after the golf season.  Hearing Loss  Zachariah has been using hearing aids for two years, which help him with watching television and group conversations.    Sleep Apnea  Uses a CPAP machine regularly for sleep apnea.    Swallowing Issues  Zachariah has a history of  swallowing difficulties due to Schatzki's ring, which was previously dilated.    Past TIA  Experienced a transient ischemic attack (TIA) in the past, leading to the implantation of a Watchman device.    Back Pain  Zachariah reports soreness in his back in the morning, which improves after stretching.    Social History  Zachariah does not smoke currently but did in college. He consumes alcohol moderately. He has advanced healthcare directives in place    Medication and Supplement Use  Takes a variety of medications and supplements, including Crestor, potassium, omeprazole, multivitamins, glucosamine, mirtazapine, melatonin, magnesium, losartan, levothyroxine, vitamin D, calcium with vitamin D, and vitamin C. Reports that mirtazapine aids sleep, increasing sleep duration by about an hour. Believes vitamin C helps prevent sinus colds.    Lifestyle  Does not smoke currently but smoked in college. Consumes alcohol moderately. Used to hike in [place] during morgan but no longer does due to his wife's health.     Advance Care Planning    Document on file is a Health Care Directive or POLST.        6/20/2025   General Health   How would you rate your overall physical health? Good   Feel stress (tense, anxious, or unable to sleep) Only a little   (!) STRESS CONCERN      6/20/2025   Nutrition   Diet: Regular (no restrictions)         6/20/2025   Exercise   Days per week of moderate/strenous exercise 4 days   Average minutes spent exercising at this level 30 min         6/20/2025   Social Factors   Frequency of gathering with friends or relatives Once a week   Worry food won't last until get money to buy more No   Food not last or not have enough money for food? No   Do you have housing? (Housing is defined as stable permanent housing and does not include staying outside in a car, in a tent, in an abandoned building, in an overnight shelter, or couch-surfing.) Yes   Are you worried about losing your housing? No   Lack of  transportation? No   Unable to get utilities (heat,electricity)? No         6/20/2025   Fall Risk   Fallen 2 or more times in the past year? No    No   Trouble with walking or balance? No    No       Multiple values from one day are sorted in reverse-chronological order          6/20/2025   Activities of Daily Living- Home Safety   Needs help with the following daily activites None of the above   Safety concerns in the home Throw rugs in the hallway         6/20/2025   Dental   Dentist two times every year? Yes         6/20/2025   Hearing Screening   Hearing concerns? (!) IT'S HARD TO FOLLOW A CONVERSATION IN A NOISY RESTAURANT OR CROWDED ROOM.    (!) TROUBLE UNDERSTANDING SPEECH ON THE TELEPHONE   Would you like a referral for hearing testing? I already have hearing aids       Multiple values from one day are sorted in reverse-chronological order         6/20/2025   Driving Risk Screening   Patient/family members have concerns about driving No         6/20/2025   General Alertness/Fatigue Screening   Have you been more tired than usual lately? No         6/20/2025   Urinary Incontinence Screening   Bothered by leaking urine in past 6 months Yes         Today's PHQ-2 Score:       6/24/2025     8:24 PM   PHQ-2 ( 1999 [employer])   Q1: Little interest or pleasure in doing things 0   Q2: Feeling down, depressed or hopeless 0   PHQ-2 Score 0    Q1: Little interest or pleasure in doing things Not at all   Q2: Feeling down, depressed or hopeless Not at all   PHQ-2 Score 0       Patient-reported           6/20/2025   Substance Use   Alcohol more than 3/day or more than 7/wk No   Do you have a current opioid prescription? No   How severe/bad is pain from 1 to 10? 1/10   Do you use any other substances recreationally? No     Social History     Tobacco Use    Smoking status: Never    Smokeless tobacco: Never   Vaping Use    Vaping status: Never Used   Substance Use Topics    Alcohol use: Yes     Comment: 3-4 ounces of venkat with  "water 2 per week.    Drug use: Never                 Reviewed and updated as needed this visit by Provider    Allergies  Meds  Problems               Past Medical History:   Diagnosis Date    Antiplatelet or antithrombotic long-term use     Arrhythmia     A FIB    Atrial flutter (H)     BPH (benign prostatic hyperplasia)     Cerebral infarction (H) 3/19/23    \"Possible\" Brain TIA, further tests 2023.    ED (erectile dysfunction)     Elevated prostate specific antigen (PSA)     GERD (gastroesophageal reflux disease)     Heart murmur     MVP    History of blood transfusion 1996 [place] open heart surgery.    Repaired mitral valve.    History of skin cancer     History of stroke without residual deficits 3/19/23    Episode lasted 3-4 minutes, veered to the right.  [facility] in [place], [place] took MRI and other tests.  Results should have been faxed to [employer].  Diagnosis brain TIA, details include \"chronic lunar infarcts\".    Hyperlipidemia     Hypertension     Insomnia     Left inguinal hernia     Lentigo maligna melanoma (H)     MVP (mitral valve prolapse)     Nasal congestion     Obstructive sleep apnea     SEVERE    -  USES CPAP    Paroxysmal atrial fibrillation (H)     Schatzki's ring     Seborrheic dermatitis     Smoking history     quit 1973    Thyroid disease Partial thyroidectomy 2008, benign tumor.    Thyroid controlled by levothyroxine.    Urinary frequency      Past Surgical History:   Procedure Laterality Date    ABDOMEN SURGERY  [place] right side 1994.  Prostatectomy [place] 5/20    Normal side effects.    BIOPSY  For various skin cancers, prostate.    Treatment completed at the time.    CARDIOVERSION  08/07/2017    COLONOSCOPY  2015     at [facility] ([email or web address], [place], [place])    EGD, GASTROESOPHAGEAL REFLUX TEST WITH MUCOSAL PH ELECTRODE      ENT SURGERY  Partial parotidectomy 2004    Benign tumor.    GENITOURINARY SURGERY  Prostatectomy [place] 5/20.    Usual side effects.    HC KNEE " SCOPE,MED/LAT MENISCUS REPAIR Left 2016    HERNIA REPAIR  1994    HERNIORRHAPHY INGUINAL Left 01/27/2021    Procedure: OPEN LEFT INGUINAL HERNIA REPAIR;  Surgeon: Bernard Caballero MD;  Location: [facility] OR    LASIK      Left atrial appendage ligation  1996    MR PROSTATE  W/O & W CONTRAST  2016, 2018    PAROTIDECTOMY  2004    partial parotidectomy    PROSTATE SURGERY  5/12/20    [facility]    PROSTATECTOMY, RETROPUBIC, RADICAL, ROBOT-ASSISTED, LAP, USING DA LEONEL SI, W/PELVIC LYMPHADENECTOMY  05/13/2020    s/p RALP in 05/2020 at [place]. Path showed prostate cancer Sheeba 4+3=7    REPAIR VALVE MITRAL  1996    SKIN CANCER SCREENING      Skin cancer surgeries-- basal on ear 2008, melanoma on left sideburn 2011, carcinoma right arm 2013    THYROIDECTOMY  2008    partial thyroidectomy     Lab work is in process  Labs reviewed in EPIC  BP Readings from Last 3 Encounters:   06/25/25 121/63   01/06/25 110/58   12/16/24 129/59    Wt Readings from Last 3 Encounters:   06/25/25 82.5 kg (181 lb 12.8 oz)   01/06/25 86.2 kg (190 lb)   12/16/24 87.5 kg (192 lb 14.4 oz)                  Patient Active Problem List   Diagnosis    Smoking history    Paroxysmal atrial fibrillation (H)    Atrial flutter (H)    GERD (gastroesophageal reflux disease)    Hyperlipidemia    Obstructive sleep apnea    Hypertension    Left inguinal hernia    Lumbar radiculitis    Lumbar disc herniation    Acute right-sided low back pain with right-sided sciatica    Osteopenia    Urinary frequency    Seborrheic dermatitis of scalp    Schatzki's ring    Polyp of colon    Persistent sinus bradycardia    Overweight    Nasal congestion    MVP (mitral valve prolapse)    Psychophysiological insomnia    Hypothyroidism    History of transient ischemic attack    Personal history of malignant neoplasm of prostate    History of skin cancer    History of seborrheic dermatitis    High prostate specific antigen (PSA)    Glaucoma    Long term (current) use of  anticoagulants    Dyslipidemia    Erectile dysfunction    First degree atrioventricular block    Brain TIA    Benign prostatic hyperplasia    BPH without urinary obstruction    Atypical small acinar proliferation of prostate    Abnormality of left atrial appendage    Abnormal results on imaging study of genitourinary system    Malignant neoplasm of prostate s/p Davinci prostatectomy May 2020  (H)    Abnormal echocardiography    Chronic kidney disease, stage 3a (H)     Past Surgical History:   Procedure Laterality Date    ABDOMEN SURGERY  Hernia right side 1994.  Prostatectomy [place] 5/20    Normal side effects.    BIOPSY  For various skin cancers, prostate.    Treatment completed at the time.    CARDIOVERSION  08/07/2017    COLONOSCOPY  2015     at [facility] ([place])    EGD, GASTROESOPHAGEAL REFLUX TEST WITH MUCOSAL PH ELECTRODE      ENT SURGERY  Partial parotidectomy 2004    Benign tumor.    GENITOURINARY SURGERY  Prostatectomy [place] 5/20.    Usual side effects.    HC KNEE SCOPE,MED/LAT MENISCUS REPAIR Left 2016    HERNIA REPAIR  1994    HERNIORRHAPHY INGUINAL Left 01/27/2021    Procedure: OPEN LEFT INGUINAL HERNIA REPAIR;  Surgeon: Bernard Caballero MD;  Location:  OR    [facility]      Left atrial appendage ligation  1996    MR PROSTATE  W/O & W CONTRAST  2016, 2018    PAROTIDECTOMY  2004    partial parotidectomy    PROSTATE SURGERY  5/12/20    [facility]    PROSTATECTOMY, RETROPUBIC, RADICAL, ROBOT-ASSISTED, LAP, USING DA LEONEL SI, W/PELVIC LYMPHADENECTOMY  05/13/2020    s/p RALP in 05/2020 at [facility]. Path showed prostate cancer Sheeba 4+3=7    REPAIR VALVE MITRAL  1996    SKIN CANCER SCREENING      Skin cancer surgeries-- basal on ear 2008, melanoma on left sideburn 2011, carcinoma right arm 2013    THYROIDECTOMY  2008    partial thyroidectomy       Social History     Tobacco Use    Smoking status: Never    Smokeless tobacco: Never   Substance Use Topics    Alcohol use:  Yes     Comment: 3-4 ounces of venkat with water 2 per week.     Family History   Problem Relation Age of Onset    Coronary Artery Disease Early Onset Father     Coronary Artery Disease Father         Problems age 50,  age 66.    Heart Disease Father         Passed age 66    Diabetes Brother         Since est age 50, overweight then.    Coronary Artery Disease Brother         AFib,etc.  [place] pace maker/defibrulator    Diabetes Maternal Grandfather     Coronary Artery Disease Brother         Mitral valve repaire age 60    Other Cancer Sister          age [id number]    Cancer Sister         Non-smoker dies of lung cancer age 40.    Other Cancer Paternal Grandfather         Cancer of the mouth,  age 66.    Cerebrovascular Disease Paternal Grandfather         Age 66, passed 10 days    Hypertension Paternal Grandmother          10 days after a paralyzing stroke at age 66    Diabetes Brother         Since est age 50, overweight then.    Coronary Artery Disease Brother         AFib,etc.  [place] pace maker/defibrulator    Heart Disease Brother         Afib, pacemaker/defib    Coronary Artery Disease Brother         Mitral valve repaire age 60    Heart Disease Brother         Mitral valve repair age 60    Enlarged prostate Brother          Current Outpatient Medications   Medication Sig Dispense Refill    acetaminophen (TYLENOL) 500 MG tablet Take 500 mg by mouth nightly as needed      amoxicillin (AMOXIL) 500 MG capsule TAKE 4 CAPSULES BY MOUTH 30 TO  60 MINUTES PRIOR TO DENTAL  PROCEDURE 12 capsule 1    Ascorbic Acid (VITAMIN C) 500 MG CAPS Take 500 mg by mouth 2 times daily      calcium citrate-vitamin D (CITRACAL) 200-6.25 MG-MCG TABS per tablet 2 tablets daily      cholecalciferol 50 MCG ( UT) CAPS Take 1 capsule by mouth daily      ELIQUIS ANTICOAGULANT 5 MG tablet TAKE 1 TABLET BY MOUTH TWICE  DAILY 180 tablet 3    flecainide (TAMBOCOR) 100 MG tablet Take 1 tablet (100 mg) by mouth 2 times daily. 180  tablet 3    fluorouracil (EFUDEX) 5 % external cream Apply topically daily As needed for spots on scalp, per direction of dermatology      hydrocortisone (CORTAID) 0.5 % external cream Apply topically daily as needed      ketoconazole (NIZORAL) 2 % external cream Apply topically daily as needed for itching 60 g 3    ketoconazole (NIZORAL) 2 % external shampoo APPLY TO SCALP, LATHER, LEAVE IN PLACE FOR 5 MINUTES, THEN RINSE  OFF. USE ONCE EVERY 3 TO 4 DAYS  UP TO 8 WEEKS, THEN AS NEEDED AS DIRECTED 360 mL 0    latanoprost (XALATAN) 0.005 % ophthalmic solution Place 1 drop into both eyes daily      levothyroxine (SYNTHROID/LEVOTHROID) 100 MCG tablet TAKE 1 TABLET BY MOUTH DAILY 90 tablet 3    losartan (COZAAR) 50 MG tablet TAKE 1 TABLET BY MOUTH TWICE  DAILY 180 tablet 1    MAGNESIUM PO Take 500 mg by mouth daily      Melatonin 10 MG CAPS Take 5 mg by mouth daily.      metoprolol tartrate (LOPRESSOR) 25 MG tablet Take 1 tablet (25 mg) by mouth 2 times daily as needed (AFib). 30 tablet 1    mirtazapine (REMERON) 7.5 MG tablet TAKE 1 TABLET BY MOUTH AT  BEDTIME 90 tablet 3    Misc Natural Products (GLUCOSAMINE CHOND MSM FORMULA PO) Take 1 tablet by mouth daily  Contains Glucosamine 750 mg, Chrondroidtin 400 mg, , Vit D3 1,000 international unit(s)      Multiple Vitamins-Minerals (VITRUM 50+ SENIOR MULTI) TABS Take by mouth daily      omeprazole (PRILOSEC) 20 MG DR capsule TAKE 1 CAPSULE BY MOUTH DAILY 90 capsule 3    Potassium Gluconate 550 MG TABS Take 550 mg by mouth daily      rosuvastatin (CRESTOR) 20 MG tablet Take 1 tablet (20 mg) by mouth daily. 90 tablet 1    tretinoin (RETIN-A) 0.05 % external cream Apply topically At Bedtime      vitamin B-12 (CYANOCOBALAMIN) 500 MCG tablet Take 500 mcg by mouth daily      tadalafil (CIALIS) 5 MG tablet Take 2 tablets (10 mg) by mouth daily as needed (Erectile dysfunction) 30 tablet 6     Allergies   Allergen Reactions    Amlodipine Swelling     Noted at 5 mg    Poison  Stephanie Extract Itching    Lisinopril Cough     Recent Labs   Lab Test 06/25/25  0825 11/11/24  1620 06/19/24  0835 06/19/23  1004 03/20/23  0518 09/09/22  1138 05/27/22  1150 06/15/21  1433 01/14/19  1403   A1C  --   --  5.6  --   --   --   --   --   --    LDL 46  --  35  --   --  37 101*  --   --    HDL 58  --  56  --   --  62 63  --   --    TRIG 56  --  94  --  111 107 130  --   --    ALT 23  --  23  --   --   --  26   < >  --    CR 1.39* 1.19* 1.24*  --   --   --  0.99   < > 1.16   GFRESTIMATED 51* 62 59*  --   --   --  78   < > 62   GFRESTBLACK  --   --   --   --   --   --   --   --  75   POTASSIUM 4.5 4.5 4.6   < >  --   --  4.7   < > 4.2   TSH 2.00  --  1.25   < >  --   --  2.87   < >  --     < > = values in this interval not displayed.      Results  - EKG performed on June 9, 2025, showed atrial flutter.  - Bone density test indicates osteoporosis with a 3.4% risk of hip fracture.  - Ultrasound for AAA screening on December 20, 2010, showed normal caliber abdominal aorta with a maximum dimension of 2.2 cm.    Current providers sharing in care for this patient include:  Patient Care Team:  Edu Reyes MD as PCP - General (Internal Medicine)  Juan Cornelius MD, MD as MD (Urology)  Tommy Sanchez MD as MD (Urology)  Katja Godoy MD as MD (Neurology)  Arlen Abel CNP as Assigned Surgical Provider  Edu Reyes MD as Assigned PCP  Melia Jaime, PharmD as Assigned MTM Pharmacist  Sera Gayle PA-C as Physician Assistant (Cardiovascular Disease)  Juan Toure MD as MD (Cardiovascular Disease)  Gayle, Sera McAllan, PA-C as Assigned Heart and Vascular Provider    The following health maintenance items are reviewed in Epic and correct as of today:  Health Maintenance   Topic Date Due    URINALYSIS  Never done    COVID-19 VACCINE (9 - 2024-25 season) 03/25/2025    MICROALBUMIN  06/19/2025    MEDICARE ANNUAL WELLNESS VISIT  06/25/2026    BMP  06/25/2026     "LIPID  06/25/2026    TSH W/FREE T4 REFLEX  06/25/2026    ANNUAL REVIEW OF HM ORDERS  06/25/2026    FALL RISK ASSESSMENT  06/25/2026    HEMOGLOBIN  06/25/2026    DIABETES SCREENING  06/25/2028    ADVANCE CARE PLANNING  06/25/2030    DTAP/TDAP/TD VACCINE (3 - Td or Tdap) 08/13/2030    PHQ-2 (once per calendar year)  Completed    INFLUENZA VACCINE  Completed    PNEUMOCOCCAL VACCINE 50+ YEARS  Completed    ZOSTER VACCINE  Completed    RSV VACCINE  Completed    HPV VACCINE  Aged Out    MENINGITIS VACCINE  Aged Out    COLORECTAL CANCER SCREENING  Discontinued         Review of Systems  Constitutional, neuro, ENT, endocrine, pulmonary, cardiac, gastrointestinal, genitourinary, musculoskeletal, integument and psychiatric systems are negative, except as otherwise noted.     Objective    Exam  /63 (BP Location: Right arm, Patient Position: Sitting, Cuff Size: Adult Regular)   Pulse 52   Temp 97.8  F (36.6  C) (Temporal)   Resp 18   Ht 1.735 m (5' 8.31\")   Wt 82.5 kg (181 lb 12.8 oz)   SpO2 93%   BMI 27.39 kg/m     Estimated body mass index is 27.39 kg/m  as calculated from the following:    Height as of this encounter: 1.735 m (5' 8.31\").    Weight as of this encounter: 82.5 kg (181 lb 12.8 oz).    Physical Exam  GENERAL: alert and no distress  EYES: Eyes grossly normal to inspection, PERRL and conjunctivae and sclerae normal  HENT: ear canals and TM's normal, nose and mouth without ulcers or lesions  NECK: no adenopathy, no asymmetry, masses, or scars  RESP: lungs clear to auscultation - no rales, rhonchi or wheezes  CV: regular rate and rhythm, normal S1 S2, no S3 or S4, no murmur, click or rub, no peripheral edema  ABDOMEN: soft, nontender, no hepatosplenomegaly, no masses and bowel sounds normal  MS: no gross musculoskeletal defects noted, no edema  SKIN: no suspicious lesions or rashes  NEURO: Normal strength and tone, mentation intact and speech normal  PSYCH: mentation appears normal, affect " normal/bright    Physical Exam  - HEENT: No conjunctival injection, pupils reactive to light, positive red reflexes, hearing aids in place, ear canals clear.  - CARDIOVASCULAR: Bradycardia, pulse equal bilaterally, no chest pain or tightness, no dizziness or lightheadedness, heart rate increased to 55 with activity.  - LUNGS: Clear to auscultation bilaterally, no breathing issues.  - MUSCULOSKELETAL: Nontender musculoskeletal examination, no significant tenderness on the back, normal gait.  - SKIN: Benign lesions on the chest and right shoulder, old scar on mid-chest area healed in the sternum.  - NEUROLOGIC: No tremor, normal gait, no dizziness standing up from sitting position.  - EXTREMITIES: No edema in lower extremities, no claudication.     Physical Exam  HEENT: Corrective glasses, no conjunctival injection, pupils reactive to light, positive red reflexes, hearing aids in use, ear canals clear.  CARDIOVASCULAR: Bradycardia at 40 bpm, heart rate increased to 55 bpm with exertion.  LUNGS: Lungs clear to auscultation bilaterally. , no breathing issues reported.  MUSCULOSKELETAL: No musculoskeletal tenderness, no significant tenderness on the back.  SKIN: Benign-appearing patches on the chest, right shoulder.  NEUROLOGIC: No tremor, good balance when walking.  EXTREMITIES: No edema in lower extremities, no claudication.     Results  - EKG performed on June 9, 2025, showed atrial flutter.  - Bone density test indicates osteoporosis with a 3.4% risk of hip fracture.  - Ultrasound for AAA screening on December 20, 2010, showed normal caliber abdominal aorta with a maximum dimension of 2.2 cm.     ResultsEKG performed on June 9, 2025, showed atrial flutter triggered by treadmill exercise. Bone density test indicates osteoporosis with a 3.4% risk of hip fracture. Ultrasound for AAA screening performed on December 20, 2010, showed normal caliber abdominal aorta with a maximum dimension of 2.2 cm.          6/25/2025    Mini Cog   Clock Draw Score 2 Normal   3 Item Recall 3 objects recalled   Mini Cog Total Score 5            Total time spent was 40 minutes review of records, addressing multiple health conditions, review of labs, physical exam, documentation in addition to annual wellness visit.  Signed Electronically by: Edu Reyes MD

## 2025-06-28 DIAGNOSIS — E03.9 HYPOTHYROIDISM, UNSPECIFIED TYPE: ICD-10-CM

## 2025-06-30 RX ORDER — LEVOTHYROXINE SODIUM 100 UG/1
100 TABLET ORAL DAILY
Qty: 90 TABLET | Refills: 2 | Status: SHIPPED | OUTPATIENT
Start: 2025-06-30

## 2025-07-10 ENCOUNTER — LAB (OUTPATIENT)
Dept: LAB | Facility: CLINIC | Age: 80
End: 2025-07-10
Payer: MEDICARE

## 2025-07-10 DIAGNOSIS — N17.9 AKI (ACUTE KIDNEY INJURY): ICD-10-CM

## 2025-07-10 DIAGNOSIS — I10 PRIMARY HYPERTENSION: ICD-10-CM

## 2025-07-10 DIAGNOSIS — R35.0 URINARY FREQUENCY: ICD-10-CM

## 2025-07-10 LAB
ALBUMIN UR-MCNC: NEGATIVE MG/DL
APPEARANCE UR: CLEAR
BACTERIA #/AREA URNS HPF: ABNORMAL /HPF
BILIRUB UR QL STRIP: NEGATIVE
CAOX CRY #/AREA URNS HPF: ABNORMAL /HPF
COLOR UR AUTO: YELLOW
GLUCOSE UR STRIP-MCNC: NEGATIVE MG/DL
HGB UR QL STRIP: NEGATIVE
KETONES UR STRIP-MCNC: NEGATIVE MG/DL
LEUKOCYTE ESTERASE UR QL STRIP: NEGATIVE
NITRATE UR QL: NEGATIVE
PH UR STRIP: 6 [PH] (ref 5–7)
RBC #/AREA URNS AUTO: ABNORMAL /HPF
SP GR UR STRIP: 1.01 (ref 1–1.03)
SQUAMOUS #/AREA URNS AUTO: ABNORMAL /LPF
UROBILINOGEN UR STRIP-ACNC: 0.2 E.U./DL
WBC #/AREA URNS AUTO: ABNORMAL /HPF

## 2025-07-14 ASSESSMENT — SLEEP AND FATIGUE QUESTIONNAIRES
HOW LIKELY ARE YOU TO NOD OFF OR FALL ASLEEP WHEN YOU ARE A PASSENGER IN A CAR FOR AN HOUR WITHOUT A BREAK: SLIGHT CHANCE OF DOZING
HOW LIKELY ARE YOU TO NOD OFF OR FALL ASLEEP WHILE WATCHING TV: MODERATE CHANCE OF DOZING
HOW LIKELY ARE YOU TO NOD OFF OR FALL ASLEEP WHILE SITTING AND READING: SLIGHT CHANCE OF DOZING
HOW LIKELY ARE YOU TO NOD OFF OR FALL ASLEEP WHILE SITTING AND TALKING TO SOMEONE: WOULD NEVER DOZE
HOW LIKELY ARE YOU TO NOD OFF OR FALL ASLEEP WHILE SITTING QUIETLY AFTER LUNCH WITHOUT ALCOHOL: SLIGHT CHANCE OF DOZING
HOW LIKELY ARE YOU TO NOD OFF OR FALL ASLEEP WHILE LYING DOWN TO REST IN THE AFTERNOON WHEN CIRCUMSTANCES PERMIT: SLIGHT CHANCE OF DOZING
HOW LIKELY ARE YOU TO NOD OFF OR FALL ASLEEP IN A CAR, WHILE STOPPED FOR A FEW MINUTES IN TRAFFIC: WOULD NEVER DOZE
HOW LIKELY ARE YOU TO NOD OFF OR FALL ASLEEP WHILE SITTING INACTIVE IN A PUBLIC PLACE: SLIGHT CHANCE OF DOZING

## 2025-07-15 ENCOUNTER — TELEPHONE (OUTPATIENT)
Dept: CARDIOLOGY | Facility: CLINIC | Age: 80
End: 2025-07-15
Payer: MEDICARE

## 2025-07-15 NOTE — TELEPHONE ENCOUNTER
[facility] Call Center    Phone Message    May a detailed message be left on voicemail: yes     Reason for Call: Other: patient is calling and would like the care team to call him he stated he sent over two medical messages on Sequence wondering if the care team would like him to do a holter monitor as he is stating that he is a flutter and has been for the last 3 days, please advise, thank you.    Patient denied triage   Action Taken: Other: cardiology     Travel Screening: Not Applicable     Date of Service:

## 2025-07-17 ENCOUNTER — VIRTUAL VISIT (OUTPATIENT)
Dept: PHARMACY | Facility: CLINIC | Age: 80
End: 2025-07-17
Payer: COMMERCIAL

## 2025-07-17 ENCOUNTER — OFFICE VISIT (OUTPATIENT)
Dept: SLEEP MEDICINE | Facility: CLINIC | Age: 80
End: 2025-07-17
Payer: MEDICARE

## 2025-07-17 VITALS
SYSTOLIC BLOOD PRESSURE: 114 MMHG | HEART RATE: 66 BPM | HEIGHT: 69 IN | DIASTOLIC BLOOD PRESSURE: 74 MMHG | BODY MASS INDEX: 27.11 KG/M2 | WEIGHT: 183 LBS | OXYGEN SATURATION: 93 %

## 2025-07-17 DIAGNOSIS — I10 PRIMARY HYPERTENSION: ICD-10-CM

## 2025-07-17 DIAGNOSIS — I48.0 PAROXYSMAL ATRIAL FIBRILLATION (H): ICD-10-CM

## 2025-07-17 DIAGNOSIS — G47.00 INSOMNIA, UNSPECIFIED TYPE: ICD-10-CM

## 2025-07-17 DIAGNOSIS — Z23 NEED FOR VACCINATION: ICD-10-CM

## 2025-07-17 DIAGNOSIS — G47.33 OSA (OBSTRUCTIVE SLEEP APNEA): Primary | ICD-10-CM

## 2025-07-17 DIAGNOSIS — M81.0 AGE-RELATED OSTEOPOROSIS WITHOUT CURRENT PATHOLOGICAL FRACTURE: Primary | ICD-10-CM

## 2025-07-17 DIAGNOSIS — R13.19 OTHER DYSPHAGIA: ICD-10-CM

## 2025-07-17 RX ORDER — METHYLPREDNISOLONE SODIUM SUCCINATE 40 MG/ML
40 INJECTION INTRAMUSCULAR; INTRAVENOUS
Start: 2025-07-31

## 2025-07-17 RX ORDER — TRIAMCINOLONE ACETONIDE 1 MG/G
0.1 OINTMENT TOPICAL 2 TIMES DAILY PRN
COMMUNITY
Start: 2024-11-10

## 2025-07-17 RX ORDER — ALBUTEROL SULFATE 90 UG/1
1-2 INHALANT RESPIRATORY (INHALATION)
Start: 2025-07-31

## 2025-07-17 RX ORDER — EPINEPHRINE 1 MG/ML
0.3 INJECTION, SOLUTION, CONCENTRATE INTRAVENOUS EVERY 5 MIN PRN
OUTPATIENT
Start: 2025-07-31

## 2025-07-17 RX ORDER — ZOLEDRONIC ACID 0.05 MG/ML
5 INJECTION, SOLUTION INTRAVENOUS ONCE
Start: 2025-07-31

## 2025-07-17 RX ORDER — HEPARIN SODIUM,PORCINE 10 UNIT/ML
5-20 VIAL (ML) INTRAVENOUS DAILY PRN
OUTPATIENT
Start: 2025-07-31

## 2025-07-17 RX ORDER — ALBUTEROL SULFATE 0.83 MG/ML
2.5 SOLUTION RESPIRATORY (INHALATION)
OUTPATIENT
Start: 2025-07-31

## 2025-07-17 RX ORDER — HEPARIN SODIUM (PORCINE) LOCK FLUSH IV SOLN 100 UNIT/ML 100 UNIT/ML
5 SOLUTION INTRAVENOUS
OUTPATIENT
Start: 2025-07-31

## 2025-07-17 RX ORDER — DIPHENHYDRAMINE HYDROCHLORIDE 50 MG/ML
25 INJECTION, SOLUTION INTRAMUSCULAR; INTRAVENOUS
Start: 2025-07-31

## 2025-07-17 RX ORDER — DIPHENHYDRAMINE HYDROCHLORIDE 50 MG/ML
50 INJECTION, SOLUTION INTRAMUSCULAR; INTRAVENOUS
Start: 2025-07-31

## 2025-07-17 NOTE — PROGRESS NOTES
Medication Therapy Management (MTM) Encounter    ASSESSMENT:                            Medication Adherence/Access: No issues identified.    Osteoporosis  Patient is meeting RDI of calcium 1200mg/day between dietary intake and supplementation. Vitamin D is at goal > 30 ng/dL. Patient would benefit from additional therapy.  Given baseline challenges with swallowing, prefer Reclast or Prolia.      Hypertension/A. Fib  Patient is meeting blood pressure goal of < 130/80mmHg.    Plan in place regarding A. Fib.    Insomnia  Stable.    Immunizations  Due for annual influenza vaccine in the fall, eligible for COVID-19 booster now.    PLAN:                            Start Reclast infusions annually - plan for 3 years and will adjust duration based on DEXA results.  The infusion center should call you to schedule, you can call them at [facility]: [phone] as well.  I recommend getting your annual influenza vaccine in the fall.  You are eligible for a COVID-19 booster now.    Follow-up: Return in about 3 months (around 10/17/2025) for Follow-up Medication Review.    SUBJECTIVE/OBJECTIVE:                          Zachariah Ram is a 80 year old male seen for a follow-up visit.       Reason for visit: Discuss osteoporosis management.    Tobacco: He reports that he has never smoked. He has never used smokeless tobacco.  Alcohol: about 4 oz of venkat per week    Medication Adherence/Access: no issues reported.    Bone Health   Osteoporosis  Calcium citrate/vitamin D 2 tablets daily  Vitamin D 2000 international unit(s) daily  Patient is experiencing side effects.  DEXA History: Osteopenia - FRAX Results: The 10 year probability of major osteoporotic fracture is 8.9%, and of hip fracture is 3.4%, based on left femoral neck BMD.  Diagnosed as osteoporosis equivalent by primary care provider.  Patient is getting 2 serving(s)/day of calcium in their diet.  Risk factors: chronic PPI use (unable to  stop as he then starts having more swallowing trouble)      Hypertension /A. Fib  Eliquis 5mg twice daily   Flecainide 100mg twice daily   Losartan 50mg twice daily  Metoprolol tartrate 25mg twice daily as needed   Patient reports no current medication side effects  Patient does not self-monitor blood pressure.  Patient had TIA, followed by Watchman procedure  He's been having more A. Fib symptoms for the last month - is at [facility] this week for testing/cardiology visits.      Insomnia   Acetaminophen 500mg at bedtime as needed - uses once per week or less  Melatonin 5mg at bedtime   Mirtazapine 7.5mg at bedtime  He consistently uses CPAP.  He reports sleep has been stable.      Immunizations  Most Recent Immunizations   Administered Date(s) Administered    COVID-19 12+ (MODERNA) 10/20/2023    COVID-19 12+ (Pfizer) 09/25/2024    COVID-19 Bivalent 12+ (Pfizer) 10/14/2022    COVID-19 MONOVALENT 12+ (Pfizer) 09/27/2021    COVID-19 Monovalent 12+ (Pfizer 2022) 04/18/2022    Flu 65+ (Fluad) 09/01/2011    U6p3-11 Novel Flu P-free 12/18/2009    HepA, Unspecified 06/01/2000    Influenza (High Dose) Trivalent,PF (Fluzone) 09/25/2024    Influenza (IIV3) PF 09/13/2020    Influenza Vaccine 18-64 (Flublok) 10/13/2019    Influenza Vaccine 65+ (FLUAD) 10/20/2023    Influenza Vaccine 65+ (Fluzone HD) 10/14/2022    Pneumo Conj 13-V (2010&after) 05/11/2015    Pneumococcal 23 valent 12/20/2011    RSV Vaccine (Arexvy) 10/20/2023    TD,PF 7+ (Tenivac) 09/01/2002    TDAP Vaccine (Adacel) 08/13/2020    Tdap (Adult) Unspecified Formulation 07/01/2010    Zoster recombinant adjuvanted (Shingrix) 08/02/2019    Zoster vaccine, live 04/23/2008      Today's Vitals: There were no vitals taken for this visit.  ----------------    I spent 32 minutes with this patient today. All changes were made via collaborative practice agreement with Dr. Reyes.     A summary of these recommendations was sent via [employer].    Melia Jaime, PharmD, BCACP  Medication  Therapy Management Provider  [phone]     Telemedicine Visit Details  The patient's medications can be safely assessed via a telemedicine encounter.  Type of service:  Video Conference via [employer]  Originating Location (pt. Location): Home    Distant Location (provider location):  Off-site  Start Time: 8:32 AM  End Time: 9:04 AM     Medication Therapy Recommendations  Age-related osteoporosis without current pathological fracture   1 Current Medication: calcium citrate-vitamin D (CITRACAL) 200-6.25 MG-MCG TABS per tablet   Current Medication Si tablets daily   Rationale: Synergistic therapy - Needs additional medication therapy - Indication   Recommendation: Start Medication - Reclast 5 MG/100ML Soln   Status: Accepted per CPA   Identified Date: 2025 Completed Date: 2025         Need for vaccination   1 Rationale: Preventive therapy - Needs additional medication therapy - Indication   Recommendation: Order Vaccine - COVID-19 MRNA VACC (MODERNA) IM   Status: Patient Agreed - Adherence/Education   Identified Date: 2025 Completed Date: 2025

## 2025-07-17 NOTE — PROGRESS NOTES
Obstructive Sleep Apnea - PAP Follow-Up Visit:    Chief Complaint   Patient presents with    CPAP Follow Up       Zachariah Ram comes in today for follow-up of their severe sleep apnea, managed with CPAP.     Patient reports of generally diagnosed with sleep apnea more than 20 years ago. Split-night PSG on 1/10/2019 showed severe obstructive sleep apnea with an apnea-hypopnea index of 70.8/h. CPAP titration was effective at a pressure of 8 cm H2O. A split-night PSG report from 8/1/2023 is also available. This reported an apnea-hypopnea index of 72.3/h and RDI of 72.3/h. Lowest oxygen saturation was 86%.     Do you use a CPAP Machine at home: (Patient-Rptd) Yes  Overall, on a scale of 0-10 how would you rate your CPAP (0 poor, 10 great): (Patient-Rptd) 9  Is your mask comfortable: (Patient-Rptd) Yes  If not, why:    Is you mask leaking: (Patient-Rptd) No  If yes, where do you feel it:    How many night per week does the mask leak (0-7):    Do you notice snoring with mask on: (Patient-Rptd) No  Do you notice gasping arousals with mask on: (Patient-Rptd) No  Are you having significant oral or nasal dryness: (Patient-Rptd) Yes  Is the pressure setting comfortable: (Patient-Rptd) Yes  In not, why:    What type of mask do you use: (Patient-Rptd) Nasal Mask  What is your typical bedtime: (Patient-Rptd) 10:20  How long does it take you to go to sleep on PAP therapy: (Patient-Rptd) 5 minutes oe less  What time do you typically get out of bed for the day: (Patient-Rptd) 5:00 AM - 6:00 AM  How many hours on average per night are you using PAP therapy: (Patient-Rptd) 6 1/2  How many hours are you sleeping per night: (Patient-Rptd) 6 1/2  Do you feel well rested in the morning: (Patient-Rptd) Yes    ResMed     Auto-PAP 11.6 - 18.0 cmH2O 30 day usage data:    100% of days with > 4 hours of use. 0/30 days with no use.   Average use 412 minutes per day.   95%ile Leak 7.51 L/min.   CPAP 95% pressure 12.9 cm.   AHI 2.51 events per  "hour.     EPWORTH SLEEPINESS SCALE         7/14/2025    10:38 AM    Lehigh Sleepiness Scale ( YASMIN Medeiros  [id number]<br>ESS - [place]/English - Final version - 21 Nov 07 - [facility] [place].)   Sitting and reading Slight chance of dozing   Watching TV Moderate chance of dozing   Sitting, inactive in a public place (e.g. a theatre or a meeting) Slight chance of dozing   As a passenger in a car for an hour without a break Slight chance of dozing   Lying down to rest in the afternoon when circumstances permit Slight chance of dozing   Sitting and talking to someone Would never doze   Sitting quietly after a lunch without alcohol Slight chance of dozing   In a car, while stopped for a few minutes in traffic Would never doze   Lehigh Score (MC) 7   Lehigh Score (Sleep) 7        Patient-reported         INSOMNIA SEVERITY INDEX (YANNA)          7/14/2025    10:31 AM   Insomnia Severity Index (YANNA)   Difficulty falling asleep 0   Difficulty staying asleep 2   Problems waking up too early 1   How SATISFIED/DISSATISFIED are you with your CURRENT sleep pattern? 2   How NOTICEABLE to others do you think your sleep problem is in terms of impairing the quality of your life? 1   How WORRIED/DISTRESSED are you about your current sleep problem? 1   To what extent do you consider your sleep problem to INTERFERE with your daily functioning (e.g. daytime fatigue, mood, ability to function at work/daily chores, concentration, memory, mood, etc.) CURRENTLY? 1   YANNA Total Score 8        Patient-reported         Guidelines for Scoring/Interpretation:  Total score categories:  0-7 = No clinically significant insomnia   8-14 = Subthreshold insomnia   15-21 = Clinical insomnia (moderate severity)  22-28 = Clinical insomnia (severe)  Used via courtesy of www.myhealth.va.gov with permission from Ta Reyes PhD., [facility]    /74   Pulse 66   Ht 1.74 m (5' 8.5\")   Wt 83 kg (183 lb)   SpO2 93%   BMI 27.42 kg/m  "     Impression/Plan:     Severe obstructive sleep apnea.     Patient is using CPAP regularly and is benefiting from treatment. I reviewed download data and graphs from his device for last 30 days. Regular compliance and normal residual AHI is demonstrated, confirming adequate treatment of sleep apnea.     Patient takes Mirtazapine 7.5 mg at bedtime to help with sleep. This works well and he does not experience any adverse effects. Prior to this, he was taking OTC Diphenhydramine.     Plan:     Continue auto PAP therapy     Zachariah Ram will follow up in about 1 year(s).     I am the single focal point of care for a condition that requires longitudinal relationship and personalized care for condition(s) specified within this medical record.     Electronically signed by Dr. Kendrick Luu, Diplomate, Sleep Medicine, ABPN        CC:  Edu Reyes,

## 2025-07-17 NOTE — NURSING NOTE
"Chief Complaint   Patient presents with    CPAP Follow Up       Initial /74   Pulse 66   Ht 1.74 m (5' 8.5\")   Wt 83 kg (183 lb)   SpO2 93%   BMI 27.42 kg/m   Estimated body mass index is 27.42 kg/m  as calculated from the following:    Height as of this encounter: 1.74 m (5' 8.5\").    Weight as of this encounter: 83 kg (183 lb).    Medication Reconciliation: complete  ESS 7  Kimberlyn Estrada MA   "

## 2025-07-17 NOTE — PATIENT INSTRUCTIONS
"Recommendations from today's MTM visit:                                                    MTM (medication therapy management) is a service provided by a clinical pharmacist designed to help you get the most of out of your medicines.   Today we reviewed what your medicines are for, how to know if they are working, that your medicines are safe and how to make your medicine regimen as easy as possible.      Start Reclast infusions annually - plan for 3 years and will adjust duration based on DEXA results.  The infusion center should call you to schedule, you can call them at [facility]: [phone] as well.  I recommend getting your annual influenza vaccine in the fall.  You are eligible for a COVID-19 booster now.    Follow-up: Return in about 3 months (around 10/17/2025) for Follow-up Medication Review.    It was great speaking with you today.  I value your experience and would be very thankful for your time in providing feedback in our clinic survey. In the next few days, you may receive an email or text message from [employer] with a link to a survey related to your  clinical pharmacist.\"     To schedule another MTM appointment, please call the clinic directly or you may call the MTM scheduling line at [phone] or toll-free at [phone].     My Clinical Pharmacist's contact information:                                                      Please feel free to contact me with any questions or concerns you have.      Melia Jaime, Jim, [facility]  Medication Therapy Management Provider  [phone]    "

## 2025-07-17 NOTE — PATIENT INSTRUCTIONS
Your Body mass index is 27.42 kg/m .  Weight management is a personal decision.  If you are interested in exploring weight loss strategies, the following discussion covers the approaches that may be successful. Body mass index (BMI) is one way to tell whether you are at a healthy weight, overweight, or obese. It measures your weight in relation to your height.  A BMI of 18.5 to 24.9 is in the healthy range. A person with a BMI of 25 to 29.9 is considered overweight, and someone with a BMI of 30 or greater is considered obese. More than two-thirds of American adults are considered overweight or obese.  Being overweight or obese increases the risk for further weight gain. Excess weight may lead to heart disease and diabetes.  Creating and following plans for healthy eating and physical activity may help you improve your health.  Weight control is part of healthy lifestyle and includes exercise, emotional health, and healthy eating habits. Careful eating habits lifelong are the mainstay of weight control. Though there are significant health benefits from weight loss, long-term weight loss with diet alone may be very difficult to achieve- studies show long-term success with dietary management in less than 10% of people. Attaining a healthy weight may be especially difficult to achieve in those with severe obesity. In some cases, medications, devices and surgical management might be considered.  What can you do?  If you are overweight or obese and are interested in methods for weight loss, you should discuss this with your provider.   Consider reducing daily calorie intake by 500 calories.   Keep a food journal.   Avoiding skipping meals, consider cutting portions instead.    Diet combined with exercise helps maintain muscle while optimizing fat loss. Strength training is particularly important for building and maintaining muscle mass. Exercise helps reduce stress, increase energy, and improves fitness. Increasing  exercise without diet control, however, may not burn enough calories to loose weight.     Start walking three days a week 10-20 minutes at a time  Work towards walking thirty minutes five days a week   Eventually, increase the speed of your walking for 1-2 minutes at time    In addition, we recommend that you review healthy lifestyles and methods for weight loss available through the National Institutes of Health patient information sites:  http://win.niddk.nih.gov/publications/index.htm    And look into health and wellness programs that may be available through your health insurance provider, employer, local community center, or natalie club.

## 2025-08-07 ENCOUNTER — OFFICE VISIT (OUTPATIENT)
Dept: FAMILY MEDICINE | Facility: CLINIC | Age: 80
End: 2025-08-07
Payer: MEDICARE

## 2025-08-07 VITALS
SYSTOLIC BLOOD PRESSURE: 117 MMHG | WEIGHT: 190.4 LBS | HEART RATE: 55 BPM | RESPIRATION RATE: 20 BRPM | TEMPERATURE: 97.7 F | OXYGEN SATURATION: 98 % | HEIGHT: 69 IN | BODY MASS INDEX: 28.2 KG/M2 | DIASTOLIC BLOOD PRESSURE: 66 MMHG

## 2025-08-07 DIAGNOSIS — G47.33 OBSTRUCTIVE SLEEP APNEA: ICD-10-CM

## 2025-08-07 DIAGNOSIS — I48.0 PAROXYSMAL ATRIAL FIBRILLATION (H): Primary | ICD-10-CM

## 2025-08-07 PROCEDURE — 99214 OFFICE O/P EST MOD 30 MIN: CPT | Performed by: INTERNAL MEDICINE

## 2025-08-07 PROCEDURE — 3074F SYST BP LT 130 MM HG: CPT | Performed by: INTERNAL MEDICINE

## 2025-08-07 PROCEDURE — 1126F AMNT PAIN NOTED NONE PRSNT: CPT | Performed by: INTERNAL MEDICINE

## 2025-08-07 PROCEDURE — 3078F DIAST BP <80 MM HG: CPT | Performed by: INTERNAL MEDICINE

## 2025-08-07 RX ORDER — AMIODARONE HYDROCHLORIDE 200 MG/1
200 TABLET ORAL
COMMUNITY
Start: 2025-08-04

## 2025-08-07 ASSESSMENT — PAIN SCALES - GENERAL: PAINLEVEL_OUTOF10: NO PAIN (0)

## 2025-08-19 DIAGNOSIS — Z29.89 NEED FOR SBE (SUBACUTE BACTERIAL ENDOCARDITIS) PROPHYLAXIS: ICD-10-CM

## 2025-08-19 RX ORDER — AMOXICILLIN 500 MG/1
CAPSULE ORAL
Qty: 12 CAPSULE | Refills: 1 | Status: SHIPPED | OUTPATIENT
Start: 2025-08-19

## 2025-08-29 ENCOUNTER — MYC MEDICAL ADVICE (OUTPATIENT)
Dept: CARDIOLOGY | Facility: CLINIC | Age: 80
End: 2025-08-29
Payer: COMMERCIAL

## (undated) DEVICE — DRSG GAUZE 4X4" TRAY

## (undated) DEVICE — LINEN TOWEL PACK X5 5464

## (undated) DEVICE — SU VICRYL 0 CT-1 27" J340H

## (undated) DEVICE — GLOVE PROTEXIS W/NEU-THERA 8.0  2D73TE80

## (undated) DEVICE — GOWN IMPERVIOUS SPECIALTY XLG/XLONG 32474

## (undated) DEVICE — DRSG STERI STRIP 1/2X4" R1547

## (undated) DEVICE — PREP CHLORAPREP 26ML TINTED ORANGE  260815

## (undated) DEVICE — SU VICRYL 3-0 SH 27" J316H

## (undated) DEVICE — SU VICRYL 2-0 TIE 12X18" J905T

## (undated) DEVICE — NDL 22GA 1.5"

## (undated) DEVICE — DRAPE LAP W/ARMBOARD 29410

## (undated) DEVICE — SOL WATER IRRIG 1000ML BOTTLE 2F7114

## (undated) DEVICE — SUCTION TIP YANKAUER W/O VENT K86

## (undated) DEVICE — SU VICRYL 0 CT-1 CR 8X18" J740D

## (undated) DEVICE — SOL NACL 0.9% IRRIG 1000ML BOTTLE 2F7124

## (undated) DEVICE — PACK MINOR SBA15MIFSE

## (undated) DEVICE — ESU ELEC BLADE 2.75" COATED/INSULATED E1455

## (undated) DEVICE — SU MONOCRYL 4-0 PS-2 18" UND Y496G

## (undated) DEVICE — BLADE CLIPPER 4406

## (undated) DEVICE — ESU PENCIL W/SMOKE EVAC CVPLP2000

## (undated) RX ORDER — CEFAZOLIN SODIUM 2 G/100ML
INJECTION, SOLUTION INTRAVENOUS
Status: DISPENSED
Start: 2021-01-27

## (undated) RX ORDER — PROPOFOL 10 MG/ML
INJECTION, EMULSION INTRAVENOUS
Status: DISPENSED
Start: 2021-01-27

## (undated) RX ORDER — LIDOCAINE HYDROCHLORIDE 20 MG/ML
INJECTION, SOLUTION EPIDURAL; INFILTRATION; INTRACAUDAL; PERINEURAL
Status: DISPENSED
Start: 2021-01-27

## (undated) RX ORDER — ACETAMINOPHEN 500 MG
TABLET ORAL
Status: DISPENSED
Start: 2021-01-27

## (undated) RX ORDER — FENTANYL CITRATE 50 UG/ML
INJECTION, SOLUTION INTRAMUSCULAR; INTRAVENOUS
Status: DISPENSED
Start: 2021-01-27

## (undated) RX ORDER — HYDROCODONE BITARTRATE AND ACETAMINOPHEN 5; 325 MG/1; MG/1
TABLET ORAL
Status: DISPENSED
Start: 2021-01-27

## (undated) RX ORDER — DEXAMETHASONE SODIUM PHOSPHATE 4 MG/ML
INJECTION, SOLUTION INTRA-ARTICULAR; INTRALESIONAL; INTRAMUSCULAR; INTRAVENOUS; SOFT TISSUE
Status: DISPENSED
Start: 2021-01-27

## (undated) RX ORDER — ONDANSETRON 2 MG/ML
INJECTION INTRAMUSCULAR; INTRAVENOUS
Status: DISPENSED
Start: 2021-01-27